# Patient Record
Sex: FEMALE | Race: OTHER | Employment: UNEMPLOYED | URBAN - METROPOLITAN AREA
[De-identification: names, ages, dates, MRNs, and addresses within clinical notes are randomized per-mention and may not be internally consistent; named-entity substitution may affect disease eponyms.]

---

## 2018-10-30 ENCOUNTER — HOSPITAL ENCOUNTER (EMERGENCY)
Age: 33
Discharge: HOME | End: 2018-10-30
Payer: COMMERCIAL

## 2018-10-30 VITALS
OXYGEN SATURATION: 98 % | WEIGHT: 212 LBS | HEART RATE: 81 BPM | TEMPERATURE: 97.8 F | RESPIRATION RATE: 18 BRPM | HEIGHT: 62 IN | SYSTOLIC BLOOD PRESSURE: 101 MMHG | BODY MASS INDEX: 39.01 KG/M2 | DIASTOLIC BLOOD PRESSURE: 62 MMHG

## 2018-10-30 DIAGNOSIS — L30.9 DERMATITIS: ICD-10-CM
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
## 2018-10-30 PROCEDURE — 99282 EMERGENCY DEPT VISIT SF MDM: CPT

## 2018-10-30 RX ORDER — DOXYCYCLINE HYCLATE 100 MG
100 TABLET ORAL 2 TIMES DAILY
Qty: 28 TABLET | Refills: 0 | Status: SHIPPED | OUTPATIENT
Start: 2018-10-30 | End: 2018-11-13

## 2018-10-30 RX ORDER — IBUPROFEN 800 MG/1
800 TABLET ORAL EVERY 6 HOURS PRN
Qty: 20 TABLET | Refills: 0 | Status: SHIPPED | OUTPATIENT
Start: 2018-10-30 | End: 2019-02-05 | Stop reason: ALTCHOICE

## 2018-10-30 ASSESSMENT — PAIN SCALES - GENERAL: PAINLEVEL_OUTOF10: 3

## 2018-10-30 ASSESSMENT — PAIN DESCRIPTION - DESCRIPTORS: DESCRIPTORS: BURNING

## 2018-10-30 ASSESSMENT — PAIN DESCRIPTION - ORIENTATION: ORIENTATION: LOWER;LEFT

## 2018-10-30 ASSESSMENT — PAIN DESCRIPTION - FREQUENCY: FREQUENCY: CONTINUOUS

## 2018-10-30 ASSESSMENT — PAIN DESCRIPTION - LOCATION: LOCATION: BACK;FOOT

## 2019-02-05 ENCOUNTER — HOSPITAL ENCOUNTER (EMERGENCY)
Age: 34
Discharge: HOME | End: 2019-02-05
Payer: COMMERCIAL

## 2019-02-05 ENCOUNTER — APPOINTMENT (OUTPATIENT)
Dept: GENERAL RADIOLOGY | Age: 34
End: 2019-02-05
Payer: COMMERCIAL

## 2019-02-05 VITALS
DIASTOLIC BLOOD PRESSURE: 64 MMHG | RESPIRATION RATE: 18 BRPM | HEART RATE: 85 BPM | TEMPERATURE: 98 F | OXYGEN SATURATION: 99 % | SYSTOLIC BLOOD PRESSURE: 105 MMHG

## 2019-02-05 DIAGNOSIS — M79.671 RIGHT FOOT PAIN: Primary | ICD-10-CM

## 2019-02-05 PROCEDURE — 6370000000 HC RX 637 (ALT 250 FOR IP): Performed by: PHYSICIAN ASSISTANT

## 2019-02-05 PROCEDURE — 73630 X-RAY EXAM OF FOOT: CPT

## 2019-02-05 PROCEDURE — 99283 EMERGENCY DEPT VISIT LOW MDM: CPT

## 2019-02-05 RX ORDER — IBUPROFEN 800 MG/1
800 TABLET ORAL EVERY 6 HOURS PRN
Qty: 20 TABLET | Refills: 0 | Status: SHIPPED | OUTPATIENT
Start: 2019-02-05 | End: 2019-10-03 | Stop reason: SDUPTHER

## 2019-02-05 RX ORDER — IBUPROFEN 800 MG/1
800 TABLET ORAL ONCE
Status: COMPLETED | OUTPATIENT
Start: 2019-02-05 | End: 2019-02-05

## 2019-02-05 RX ADMIN — IBUPROFEN 800 MG: 800 TABLET ORAL at 17:37

## 2019-02-05 ASSESSMENT — PAIN SCALES - GENERAL
PAINLEVEL_OUTOF10: 4
PAINLEVEL_OUTOF10: 4

## 2019-02-18 ENCOUNTER — HOSPITAL ENCOUNTER (EMERGENCY)
Age: 34
Discharge: HOME | End: 2019-02-18
Payer: COMMERCIAL

## 2019-02-18 ENCOUNTER — APPOINTMENT (OUTPATIENT)
Dept: CT IMAGING | Age: 34
End: 2019-02-18
Payer: COMMERCIAL

## 2019-02-18 VITALS
DIASTOLIC BLOOD PRESSURE: 69 MMHG | OXYGEN SATURATION: 98 % | HEART RATE: 83 BPM | RESPIRATION RATE: 18 BRPM | SYSTOLIC BLOOD PRESSURE: 101 MMHG | TEMPERATURE: 97.9 F

## 2019-02-18 DIAGNOSIS — R31.9 HEMATURIA, UNSPECIFIED TYPE: Primary | ICD-10-CM

## 2019-02-18 LAB
BACTERIA: ABNORMAL /HPF
BILIRUBIN URINE: NEGATIVE
BLOOD, URINE: ABNORMAL
CLARITY: CLEAR
COLOR: YELLOW
GLUCOSE URINE: 100 MG/DL
HCG, URINE, POC: NEGATIVE
KETONES, URINE: NEGATIVE MG/DL
LEUKOCYTE ESTERASE, URINE: ABNORMAL
Lab: NORMAL
NEGATIVE QC PASS/FAIL: NORMAL
NITRITE, URINE: NEGATIVE
PH UA: 7.5 (ref 5–9)
POSITIVE QC PASS/FAIL: NORMAL
PROTEIN UA: NEGATIVE MG/DL
RBC UA: >20 /HPF (ref 0–2)
SPECIFIC GRAVITY UA: 1.01 (ref 1–1.03)
UROBILINOGEN, URINE: 0.2 E.U./DL
WBC UA: ABNORMAL /HPF (ref 0–5)

## 2019-02-18 PROCEDURE — 81001 URINALYSIS AUTO W/SCOPE: CPT

## 2019-02-18 PROCEDURE — 87088 URINE BACTERIA CULTURE: CPT

## 2019-02-18 PROCEDURE — 74176 CT ABD & PELVIS W/O CONTRAST: CPT

## 2019-02-18 PROCEDURE — 99283 EMERGENCY DEPT VISIT LOW MDM: CPT

## 2019-02-18 RX ORDER — NITROFURANTOIN 25; 75 MG/1; MG/1
100 CAPSULE ORAL 2 TIMES DAILY
Qty: 10 CAPSULE | Refills: 0 | Status: SHIPPED | OUTPATIENT
Start: 2019-02-18 | End: 2019-02-23

## 2019-02-18 ASSESSMENT — PAIN DESCRIPTION - LOCATION: LOCATION: ABDOMEN

## 2019-02-18 ASSESSMENT — PAIN DESCRIPTION - PAIN TYPE: TYPE: ACUTE PAIN

## 2019-02-18 ASSESSMENT — PAIN DESCRIPTION - ORIENTATION: ORIENTATION: LOWER

## 2019-02-18 ASSESSMENT — PAIN SCALES - GENERAL: PAINLEVEL_OUTOF10: 3

## 2019-02-20 LAB — URINE CULTURE, ROUTINE: NORMAL

## 2019-06-07 ENCOUNTER — APPOINTMENT (OUTPATIENT)
Dept: CT IMAGING | Age: 34
End: 2019-06-07
Payer: COMMERCIAL

## 2019-06-07 ENCOUNTER — HOSPITAL ENCOUNTER (EMERGENCY)
Age: 34
Discharge: HOME | End: 2019-06-07
Attending: EMERGENCY MEDICINE
Payer: COMMERCIAL

## 2019-06-07 VITALS
HEART RATE: 76 BPM | OXYGEN SATURATION: 100 % | SYSTOLIC BLOOD PRESSURE: 111 MMHG | DIASTOLIC BLOOD PRESSURE: 62 MMHG | WEIGHT: 200 LBS | RESPIRATION RATE: 15 BRPM | HEIGHT: 62 IN | TEMPERATURE: 98.1 F | BODY MASS INDEX: 36.8 KG/M2

## 2019-06-07 DIAGNOSIS — R10.12 LEFT UPPER QUADRANT PAIN: ICD-10-CM

## 2019-06-07 DIAGNOSIS — R19.7 NAUSEA VOMITING AND DIARRHEA: Primary | ICD-10-CM

## 2019-06-07 DIAGNOSIS — R11.2 NAUSEA VOMITING AND DIARRHEA: Primary | ICD-10-CM

## 2019-06-07 LAB
ALBUMIN SERPL-MCNC: 3.9 G/DL (ref 3.5–5.2)
ALP BLD-CCNC: 133 U/L (ref 35–104)
ALT SERPL-CCNC: 25 U/L (ref 0–32)
ANION GAP SERPL CALCULATED.3IONS-SCNC: 7 MMOL/L (ref 7–16)
AST SERPL-CCNC: 35 U/L (ref 0–31)
BILIRUB SERPL-MCNC: 0.6 MG/DL (ref 0–1.2)
BILIRUBIN URINE: NEGATIVE
BLOOD, URINE: NEGATIVE
BUN BLDV-MCNC: 14 MG/DL (ref 6–20)
CALCIUM SERPL-MCNC: 9.4 MG/DL (ref 8.6–10.2)
CHLORIDE BLD-SCNC: 99 MMOL/L (ref 98–107)
CLARITY: CLEAR
CO2: 31 MMOL/L (ref 22–29)
COLOR: YELLOW
CREAT SERPL-MCNC: 1 MG/DL (ref 0.5–1)
GFR AFRICAN AMERICAN: >60
GFR NON-AFRICAN AMERICAN: >60 ML/MIN/1.73
GLUCOSE BLD-MCNC: 259 MG/DL (ref 74–99)
GLUCOSE URINE: >=1000 MG/DL
HCG, URINE, POC: NEGATIVE
HCT VFR BLD CALC: 39.8 % (ref 34–48)
HEMOGLOBIN: 13.2 G/DL (ref 11.5–15.5)
KETONES, URINE: NEGATIVE MG/DL
LACTIC ACID: 0.9 MMOL/L (ref 0.5–2.2)
LEUKOCYTE ESTERASE, URINE: NEGATIVE
LIPASE: 23 U/L (ref 13–60)
Lab: NORMAL
MCH RBC QN AUTO: 26.9 PG (ref 26–35)
MCHC RBC AUTO-ENTMCNC: 33.2 % (ref 32–34.5)
MCV RBC AUTO: 81.2 FL (ref 80–99.9)
NEGATIVE QC PASS/FAIL: NORMAL
NITRITE, URINE: NEGATIVE
PDW BLD-RTO: 12.2 FL (ref 11.5–15)
PH UA: 5.5 (ref 5–9)
PLATELET # BLD: 296 E9/L (ref 130–450)
PMV BLD AUTO: 11.3 FL (ref 7–12)
POSITIVE QC PASS/FAIL: NORMAL
POTASSIUM SERPL-SCNC: 4.1 MMOL/L (ref 3.5–5)
PROTEIN UA: NEGATIVE MG/DL
RBC # BLD: 4.9 E12/L (ref 3.5–5.5)
SODIUM BLD-SCNC: 137 MMOL/L (ref 132–146)
SPECIFIC GRAVITY UA: 1.01 (ref 1–1.03)
TOTAL PROTEIN: 7.9 G/DL (ref 6.4–8.3)
UROBILINOGEN, URINE: 0.2 E.U./DL
WBC # BLD: 7.8 E9/L (ref 4.5–11.5)

## 2019-06-07 PROCEDURE — 81003 URINALYSIS AUTO W/O SCOPE: CPT

## 2019-06-07 PROCEDURE — 96375 TX/PRO/DX INJ NEW DRUG ADDON: CPT

## 2019-06-07 PROCEDURE — 80053 COMPREHEN METABOLIC PANEL: CPT

## 2019-06-07 PROCEDURE — 6360000002 HC RX W HCPCS: Performed by: EMERGENCY MEDICINE

## 2019-06-07 PROCEDURE — C9113 INJ PANTOPRAZOLE SODIUM, VIA: HCPCS | Performed by: EMERGENCY MEDICINE

## 2019-06-07 PROCEDURE — 36415 COLL VENOUS BLD VENIPUNCTURE: CPT

## 2019-06-07 PROCEDURE — 74177 CT ABD & PELVIS W/CONTRAST: CPT

## 2019-06-07 PROCEDURE — 83690 ASSAY OF LIPASE: CPT

## 2019-06-07 PROCEDURE — 2580000003 HC RX 258: Performed by: RADIOLOGY

## 2019-06-07 PROCEDURE — 2580000003 HC RX 258: Performed by: EMERGENCY MEDICINE

## 2019-06-07 PROCEDURE — 83605 ASSAY OF LACTIC ACID: CPT

## 2019-06-07 PROCEDURE — 96374 THER/PROPH/DIAG INJ IV PUSH: CPT

## 2019-06-07 PROCEDURE — 85027 COMPLETE CBC AUTOMATED: CPT

## 2019-06-07 PROCEDURE — 99284 EMERGENCY DEPT VISIT MOD MDM: CPT

## 2019-06-07 PROCEDURE — 6360000004 HC RX CONTRAST MEDICATION: Performed by: RADIOLOGY

## 2019-06-07 RX ORDER — SODIUM CHLORIDE 0.9 % (FLUSH) 0.9 %
10 SYRINGE (ML) INJECTION PRN
Status: COMPLETED | OUTPATIENT
Start: 2019-06-07 | End: 2019-06-07

## 2019-06-07 RX ORDER — DICYCLOMINE HYDROCHLORIDE 10 MG/1
10 CAPSULE ORAL 3 TIMES DAILY
Qty: 21 CAPSULE | Refills: 0 | Status: SHIPPED | OUTPATIENT
Start: 2019-06-07 | End: 2020-04-22

## 2019-06-07 RX ORDER — PANTOPRAZOLE SODIUM 40 MG/10ML
40 INJECTION, POWDER, LYOPHILIZED, FOR SOLUTION INTRAVENOUS ONCE
Status: COMPLETED | OUTPATIENT
Start: 2019-06-07 | End: 2019-06-07

## 2019-06-07 RX ORDER — KETOROLAC TROMETHAMINE 30 MG/ML
15 INJECTION, SOLUTION INTRAMUSCULAR; INTRAVENOUS ONCE
Status: COMPLETED | OUTPATIENT
Start: 2019-06-07 | End: 2019-06-07

## 2019-06-07 RX ORDER — 0.9 % SODIUM CHLORIDE 0.9 %
1000 INTRAVENOUS SOLUTION INTRAVENOUS ONCE
Status: COMPLETED | OUTPATIENT
Start: 2019-06-07 | End: 2019-06-07

## 2019-06-07 RX ORDER — ONDANSETRON 4 MG/1
4 TABLET, ORALLY DISINTEGRATING ORAL EVERY 8 HOURS PRN
Qty: 9 TABLET | Refills: 0 | Status: SHIPPED | OUTPATIENT
Start: 2019-06-07 | End: 2019-06-10

## 2019-06-07 RX ORDER — ONDANSETRON 2 MG/ML
4 INJECTION INTRAMUSCULAR; INTRAVENOUS ONCE
Status: COMPLETED | OUTPATIENT
Start: 2019-06-07 | End: 2019-06-07

## 2019-06-07 RX ADMIN — ONDANSETRON HYDROCHLORIDE 4 MG: 2 SOLUTION INTRAMUSCULAR; INTRAVENOUS at 02:54

## 2019-06-07 RX ADMIN — SODIUM CHLORIDE 1000 ML: 9 INJECTION, SOLUTION INTRAVENOUS at 03:42

## 2019-06-07 RX ADMIN — Medication 10 ML: at 05:34

## 2019-06-07 RX ADMIN — SODIUM CHLORIDE 1000 ML: 9 INJECTION, SOLUTION INTRAVENOUS at 02:07

## 2019-06-07 RX ADMIN — KETOROLAC TROMETHAMINE 15 MG: 30 INJECTION, SOLUTION INTRAMUSCULAR at 03:41

## 2019-06-07 RX ADMIN — PANTOPRAZOLE SODIUM 40 MG: 40 INJECTION, POWDER, FOR SOLUTION INTRAVENOUS at 02:54

## 2019-06-07 RX ADMIN — IOPAMIDOL 110 ML: 755 INJECTION, SOLUTION INTRAVENOUS at 05:33

## 2019-06-07 ASSESSMENT — PAIN DESCRIPTION - LOCATION: LOCATION: ABDOMEN

## 2019-06-07 ASSESSMENT — PAIN SCALES - GENERAL
PAINLEVEL_OUTOF10: 8
PAINLEVEL_OUTOF10: 8

## 2019-06-07 ASSESSMENT — PAIN DESCRIPTION - FREQUENCY: FREQUENCY: CONTINUOUS

## 2019-06-07 ASSESSMENT — PAIN DESCRIPTION - PAIN TYPE: TYPE: ACUTE PAIN

## 2019-06-07 ASSESSMENT — PAIN DESCRIPTION - DESCRIPTORS: DESCRIPTORS: ACHING

## 2019-06-07 NOTE — ED NOTES
Pt advised on need of urine sample. Pt states she cannot urinate at this time. Medications to be held until POC preg resulted. Fluids infusing to help urination process.      Reji Watt RN  06/07/19 8204

## 2019-06-07 NOTE — ED NOTES
Bed: 07  Expected date:   Expected time:   Means of arrival:   Comments:  triage     Karina Freeman RN  06/07/19 0128

## 2019-06-07 NOTE — ED PROVIDER NOTES
Department of Emergency Medicine   ED  Provider Note  Admit Date/RoomTime: 6/7/2019  1:28 AM  ED Room: 07/07      History of Present Illness:  6/7/19, Time: 1:38 AM         Anamaria Florentino is a 26 y.o. female presenting to the ED for abdominal pain and diarrhea, beginning yesterday. The complaints have been persistent, moderate in severity, and worsened by nothing. Patient also presents with nausea and had 1 episode of emesis. Patient has a hx of DM. She took imodium with little relief of her diarrhea. Patient denies  SOB, hematochezia, melena, hematemesis, fever, chills, urinary symptoms, sick contacts, or any other pertinent complaints. Review of Systems:   Pertinent positives and negatives are stated within HPI, all other systems reviewed and are negative.    --------------------------------------------- PAST HISTORY ---------------------------------------------  Past Medical History:  has a past medical history of Diabetes mellitus ([facility] [address] Past Surgical History:  has a past surgical history that includes Cholecystectomy. Social History:  reports that she has never smoked. She has never used smokeless tobacco. She reports that she does not drink alcohol or use drugs. Family History: family history is not on file. The patients home medications have been reviewed. Allergies: Rocephin [ceftriaxone]    ---------------------------------------------------PHYSICAL EXAM--------------------------------------    Constitutional/General: Alert and oriented x3, Mild distress  Head: Normocephalic and atraumatic  Eyes: PERRL, EOMI  Mouth: Oropharynx clear  Neck: Supple  Respiratory: Lungs clear to auscultation bilaterally, no wheezes, rales, or rhonchi. Not in respiratory distress  Cardiovascular:  Regular rate. Regular rhythm. No murmurs, gallops, or rubs. 2+ distal pulses  GI:  Abdomen Soft, LUQ tenderness, Non distended. +BS. No rebound, guarding, or rigidity. No pulsatile masses.   Musculoskeletal: Moves all extremities x 4. Warm and well perfused, no clubbing, cyanosis, or edema. Integument: Skin warm and dry. No rashes. Neurologic: GCS 15, no focal deficits    -------------------------------------------------- RESULTS -------------------------------------------------  I have personally reviewed all laboratory and imaging results for this patient. Results are listed below.      LABS:  Results for orders placed or performed during the hospital encounter of 06/07/19   CBC   Result Value Ref Range    WBC 7.8 4.5 - 11.5 E9/L    RBC 4.90 3.50 - 5.50 E12/L    Hemoglobin 13.2 11.5 - 15.5 g/dL    Hematocrit 39.8 34.0 - 48.0 %    MCV 81.2 80.0 - 99.9 fL    MCH 26.9 26.0 - 35.0 pg    MCHC 33.2 32.0 - 34.5 %    RDW 12.2 11.5 - 15.0 fL    Platelets 815 496 - 873 E9/L    MPV 11.3 7.0 - 12.0 fL   Comprehensive Metabolic Panel   Result Value Ref Range    Sodium 137 132 - 146 mmol/L    Potassium 4.1 3.5 - 5.0 mmol/L    Chloride 99 98 - 107 mmol/L    CO2 31 (H) 22 - 29 mmol/L    Anion Gap 7 7 - 16 mmol/L    Glucose 259 (H) 74 - 99 mg/dL    BUN 14 6 - 20 mg/dL    CREATININE 1.0 0.5 - 1.0 mg/dL    GFR Non-African American >60 >=60 mL/min/1.73    GFR African American >60     Calcium 9.4 8.6 - 10.2 mg/dL    Total Protein 7.9 6.4 - 8.3 g/dL    Alb 3.9 3.5 - 5.2 g/dL    Total Bilirubin 0.6 0.0 - 1.2 mg/dL    Alkaline Phosphatase 133 (H) 35 - 104 U/L    ALT 25 0 - 32 U/L    AST 35 (H) 0 - 31 U/L   Lipase   Result Value Ref Range    Lipase 23 13 - 60 U/L   Lactic Acid, Plasma   Result Value Ref Range    Lactic Acid 0.9 0.5 - 2.2 mmol/L   Urinalysis   Result Value Ref Range    Color, UA Yellow Straw/Yellow    Clarity, UA Clear Clear    Glucose, Ur >=1000 (A) Negative mg/dL    Bilirubin Urine Negative Negative    Ketones, Urine Negative Negative mg/dL    Specific Gravity, UA 1.010 1.005 - 1.030    Blood, Urine Negative Negative    pH, UA 5.5 5.0 - 9.0    Protein, UA Negative Negative mg/dL    Urobilinogen, Urine 0.2 <2.0 E.U./dL Nitrite, Urine Negative Negative    Leukocyte Esterase, Urine Negative Negative   POC Pregnancy Urine Qual   Result Value Ref Range    HCG, Urine, POC Negative Negative    Lot Number [id number]     Positive QC Pass/Fail Pass     Negative QC Pass/Fail Pass        RADIOLOGY:  Interpreted by Radiologist.  CT ABDOMEN PELVIS W IV CONTRAST Additional Contrast? None   Final Result   Mild liquid stool in the left colon. This report has been electronically signed by Erin Hillman MD.            ------------------------- NURSING NOTES AND VITALS REVIEWED ---------------------------   The nursing notes within the ED encounter and vital signs as below have been reviewed by myself. /66   Pulse 77   Temp 97.5 °F (36.4 °C)   Resp 16   Ht 5' 2\" (1.575 m)   Wt 200 lb (90.7 kg)   SpO2 97%   BMI 36.58 kg/m²   Oxygen Saturation Interpretation: Normal    The patients available past medical records and past encounters were reviewed. ------------------------------ ED COURSE/MEDICAL DECISION MAKING----------------------  Medications   0.9 % sodium chloride bolus (0 mLs Intravenous Stopped 6/7/19 0423)   ondansetron (ZOFRAN) injection 4 mg (4 mg Intravenous Given 6/7/19 0254)   pantoprazole (PROTONIX) injection 40 mg (40 mg Intravenous Given 6/7/19 0254)   ketorolac (TORADOL) injection 15 mg (15 mg Intravenous Given 6/7/19 0341)   0.9 % sodium chloride bolus (1,000 mLs Intravenous New Bag 6/7/19 0342)   sodium chloride flush 0.9 % injection 10 mL (10 mLs Intravenous Given 6/7/19 0534)   iopamidol (ISOVUE-370) 76 % injection 110 mL (110 mLs Intravenous Given 6/7/19 0533)       Medical Decision Making:      Patient presents to the ED for diarrhea, abdominal pain, nausea and emesis. Patient examined. Ordered and obtained labs. Patient administered IV fluids, zofran and protonix. Reviewed and discussed results and findings with the patient.      This patient's ED course included: a personal history and physicial examination, re-evaluation prior to disposition and IV medications    This patient has remained hemodynamically stable during their ED course. Re-Evaluations:           Patient rechecked and medicated with some improvement. Fossa mild left upper Abdominal pain but improved and patient made aware of need for follow-up and need to return if symptoms worsen. Patient and family present at the time of discussion    Consultations:                 Counseling: The emergency provider has spoken with the patient and spouse/SO and discussed todays results, in addition to providing specific details for the plan of care and counseling regarding the diagnosis and prognosis. Questions are answered at this time and they are agreeable with the plan.     --------------------------------- IMPRESSION AND DISPOSITION ---------------------------------    IMPRESSION  1. Nausea vomiting and diarrhea    2. Left upper quadrant pain        DISPOSITION  Disposition: Discharge to home  Patient condition is stable    6/7/19, 1:38 AM.    This note is prepared by Adela Middleton, acting as Scribe for Marcia Browne MD.    Marcia Browne MD:  The scribe's documentation has been prepared under my direction and personally reviewed by me in its entirety. I confirm that the note above accurately reflects all work, treatment, procedures, and medical decision making performed by me.          Marcia Browne MD  06/07/19 [address] [place] Elvin Delgado MD  06/07/19 8224

## 2019-06-07 NOTE — ED NOTES
Pt alert and oriented x4. Speech clear. Respirations easy/unlabored. Skin warm/dry. Appropriate color. No signs of acute distress noted. Pt/family teaching provided; verbalized understanding. Pt stable for discharge.      Fifi Julian RN  06/07/19 2677

## 2019-08-26 ENCOUNTER — APPOINTMENT (OUTPATIENT)
Dept: GENERAL RADIOLOGY | Age: 34
End: 2019-08-26
Payer: COMMERCIAL

## 2019-08-26 ENCOUNTER — HOSPITAL ENCOUNTER (EMERGENCY)
Age: 34
Discharge: HOME | End: 2019-08-27
Payer: COMMERCIAL

## 2019-08-26 VITALS
RESPIRATION RATE: 16 BRPM | TEMPERATURE: 98.7 F | WEIGHT: 220 LBS | HEIGHT: 62 IN | DIASTOLIC BLOOD PRESSURE: 76 MMHG | SYSTOLIC BLOOD PRESSURE: 97 MMHG | OXYGEN SATURATION: 98 % | HEART RATE: 96 BPM | BODY MASS INDEX: 40.48 KG/M2

## 2019-08-26 DIAGNOSIS — J02.9 ACUTE PHARYNGITIS, UNSPECIFIED ETIOLOGY: ICD-10-CM

## 2019-08-26 DIAGNOSIS — J06.9 UPPER RESPIRATORY TRACT INFECTION, UNSPECIFIED TYPE: Primary | ICD-10-CM

## 2019-08-26 DIAGNOSIS — R05.9 COUGH: ICD-10-CM

## 2019-08-26 LAB
HCG, URINE, POC: NEGATIVE
Lab: NORMAL
NEGATIVE QC PASS/FAIL: NORMAL
POSITIVE QC PASS/FAIL: NORMAL
STREP GRP A PCR: NEGATIVE

## 2019-08-26 PROCEDURE — 87880 STREP A ASSAY W/OPTIC: CPT

## 2019-08-26 PROCEDURE — 99283 EMERGENCY DEPT VISIT LOW MDM: CPT

## 2019-08-26 PROCEDURE — 71046 X-RAY EXAM CHEST 2 VIEWS: CPT

## 2019-08-26 ASSESSMENT — PAIN SCALES - GENERAL: PAINLEVEL_OUTOF10: 10

## 2019-08-27 PROCEDURE — 6370000000 HC RX 637 (ALT 250 FOR IP): Performed by: NURSE PRACTITIONER

## 2019-08-27 RX ORDER — BROMPHENIRAMINE MALEATE, PSEUDOEPHEDRINE HYDROCHLORIDE, AND DEXTROMETHORPHAN HYDROBROMIDE 2; 30; 10 MG/5ML; MG/5ML; MG/5ML
5 SYRUP ORAL 4 TIMES DAILY PRN
Qty: 120 ML | Refills: 0 | Status: SHIPPED | OUTPATIENT
Start: 2019-08-27 | End: 2019-09-01

## 2019-08-27 RX ORDER — AZITHROMYCIN 250 MG/1
TABLET, FILM COATED ORAL
Qty: 1 PACKET | Refills: 0 | Status: SHIPPED | OUTPATIENT
Start: 2019-08-27 | End: 2019-09-06

## 2019-08-27 RX ADMIN — HYDROCODONE BITARTRATE AND HOMATROPINE METHYLBROMIDE 5 ML: 5; 1.5 SOLUTION ORAL at 00:16

## 2019-08-27 NOTE — ED PROVIDER NOTES
negative. Patient likely with viral symptoms. Patient without any acute infectious process. Plan will be to discharge patient home with Z-Clyde due to symptoms ongoing over a week as well as Bromfed cough syrup. She was provided with 1 dose here of Hycodan cough syrup to help with the cough as well. Patient provided with referral to primary care physician to be seen within 3 to 5 days as well as when to return back to the emergency department. Patient otherwise nontoxic. Patient without any respiratory or airway compromise, no wheezing no stridor noted. Patient expressed understanding and safely discharged home       Counseling: The emergency provider has spoken with the patient and discussed todays results, in addition to providing specific details for the plan of care and counseling regarding the diagnosis and prognosis. Questions are answered at this time and they are agreeable with the plan.      --------------------------------- IMPRESSION AND DISPOSITION ---------------------------------    IMPRESSION  1. Upper respiratory tract infection, unspecified type    2. Cough    3. Acute pharyngitis, unspecified etiology        DISPOSITION  Disposition: Discharge to home  Patient condition is good      NOTE: This report was transcribed using voice recognition software.  Every effort was made to ensure accuracy; however, inadvertent computerized transcription errors may be present     LAUREN Guillermo CNP  08/27/19 7057

## 2019-10-03 ENCOUNTER — HOSPITAL ENCOUNTER (EMERGENCY)
Age: 34
Discharge: HOME | End: 2019-10-03
Payer: COMMERCIAL

## 2019-10-03 VITALS
OXYGEN SATURATION: 99 % | TEMPERATURE: 97.2 F | HEIGHT: 62 IN | WEIGHT: 220 LBS | RESPIRATION RATE: 17 BRPM | SYSTOLIC BLOOD PRESSURE: 103 MMHG | DIASTOLIC BLOOD PRESSURE: 68 MMHG | BODY MASS INDEX: 40.48 KG/M2 | HEART RATE: 95 BPM

## 2019-10-03 DIAGNOSIS — J20.9 ACUTE BRONCHITIS, UNSPECIFIED ORGANISM: Primary | ICD-10-CM

## 2019-10-03 LAB — STREP GRP A PCR: NEGATIVE

## 2019-10-03 PROCEDURE — 6370000000 HC RX 637 (ALT 250 FOR IP): Performed by: PHYSICIAN ASSISTANT

## 2019-10-03 PROCEDURE — 99283 EMERGENCY DEPT VISIT LOW MDM: CPT

## 2019-10-03 PROCEDURE — 87880 STREP A ASSAY W/OPTIC: CPT

## 2019-10-03 RX ORDER — BENZONATATE 100 MG/1
100 CAPSULE ORAL 3 TIMES DAILY PRN
Qty: 21 CAPSULE | Refills: 0 | Status: SHIPPED | OUTPATIENT
Start: 2019-10-03 | End: 2019-10-10

## 2019-10-03 RX ORDER — AZITHROMYCIN 250 MG/1
TABLET, FILM COATED ORAL
Qty: 1 PACKET | Refills: 0 | Status: SHIPPED | OUTPATIENT
Start: 2019-10-03 | End: 2019-10-07

## 2019-10-03 RX ORDER — AZITHROMYCIN 250 MG/1
500 TABLET, FILM COATED ORAL ONCE
Status: COMPLETED | OUTPATIENT
Start: 2019-10-03 | End: 2019-10-03

## 2019-10-03 RX ORDER — GUAIFENESIN/DEXTROMETHORPHAN 100-10MG/5
5 SYRUP ORAL ONCE
Status: COMPLETED | OUTPATIENT
Start: 2019-10-03 | End: 2019-10-03

## 2019-10-03 RX ORDER — IBUPROFEN 800 MG/1
800 TABLET ORAL EVERY 6 HOURS PRN
Qty: 20 TABLET | Refills: 0 | Status: SHIPPED | OUTPATIENT
Start: 2019-10-03 | End: 2020-04-22

## 2019-10-03 RX ADMIN — GUAIFENESIN AND DEXTROMETHORPHAN 5 ML: 100; 10 SYRUP ORAL at 03:41

## 2019-10-03 RX ADMIN — AZITHROMYCIN 500 MG: 250 TABLET, FILM COATED ORAL at 03:41

## 2019-10-03 ASSESSMENT — PAIN DESCRIPTION - PAIN TYPE: TYPE: ACUTE PAIN

## 2019-10-03 ASSESSMENT — PAIN SCALES - GENERAL: PAINLEVEL_OUTOF10: 10

## 2019-10-03 ASSESSMENT — PAIN DESCRIPTION - FREQUENCY: FREQUENCY: CONTINUOUS

## 2019-10-03 ASSESSMENT — PAIN DESCRIPTION - DESCRIPTORS: DESCRIPTORS: ACHING

## 2019-10-03 ASSESSMENT — PAIN DESCRIPTION - LOCATION: LOCATION: THROAT

## 2019-10-19 ENCOUNTER — HOSPITAL ENCOUNTER (EMERGENCY)
Age: 34
Discharge: HOME | End: 2019-10-19
Attending: EMERGENCY MEDICINE
Payer: COMMERCIAL

## 2019-10-19 VITALS
HEART RATE: 70 BPM | WEIGHT: 200 LBS | HEIGHT: 62 IN | TEMPERATURE: 97.2 F | RESPIRATION RATE: 16 BRPM | SYSTOLIC BLOOD PRESSURE: 145 MMHG | OXYGEN SATURATION: 98 % | DIASTOLIC BLOOD PRESSURE: 75 MMHG | BODY MASS INDEX: 36.8 KG/M2

## 2019-10-19 DIAGNOSIS — R73.9 HYPERGLYCEMIA: Primary | ICD-10-CM

## 2019-10-19 DIAGNOSIS — N39.0 URINARY TRACT INFECTION WITHOUT HEMATURIA, SITE UNSPECIFIED: ICD-10-CM

## 2019-10-19 LAB
ALBUMIN SERPL-MCNC: 3.4 G/DL (ref 3.5–5.2)
ALP BLD-CCNC: 134 U/L (ref 35–104)
ALT SERPL-CCNC: 13 U/L (ref 0–32)
ANION GAP SERPL CALCULATED.3IONS-SCNC: 8 MMOL/L (ref 7–16)
AST SERPL-CCNC: 22 U/L (ref 0–31)
BACTERIA: ABNORMAL /HPF
BASOPHILS ABSOLUTE: 0.1 E9/L (ref 0–0.2)
BASOPHILS RELATIVE PERCENT: 1.2 % (ref 0–2)
BETA-HYDROXYBUTYRATE: 0.09 MMOL/L (ref 0.02–0.27)
BILIRUB SERPL-MCNC: 0.3 MG/DL (ref 0–1.2)
BILIRUBIN URINE: NEGATIVE
BLOOD, URINE: ABNORMAL
BUN BLDV-MCNC: 15 MG/DL (ref 6–20)
CALCIUM SERPL-MCNC: 9.7 MG/DL (ref 8.6–10.2)
CHLORIDE BLD-SCNC: 93 MMOL/L (ref 98–107)
CLARITY: CLEAR
CO2: 31 MMOL/L (ref 22–29)
COLOR: YELLOW
CREAT SERPL-MCNC: 1 MG/DL (ref 0.5–1)
EKG ATRIAL RATE: 80 BPM
EKG P AXIS: 67 DEGREES
EKG P-R INTERVAL: 154 MS
EKG Q-T INTERVAL: 396 MS
EKG QRS DURATION: 86 MS
EKG QTC CALCULATION (BAZETT): 456 MS
EKG R AXIS: 47 DEGREES
EKG T AXIS: 12 DEGREES
EKG VENTRICULAR RATE: 80 BPM
EOSINOPHILS ABSOLUTE: 0.24 E9/L (ref 0.05–0.5)
EOSINOPHILS RELATIVE PERCENT: 2.9 % (ref 0–6)
EPITHELIAL CELLS, UA: ABNORMAL /HPF
GFR AFRICAN AMERICAN: >60
GFR AFRICAN AMERICAN: >60
GFR NON-AFRICAN AMERICAN: >60 ML/MIN/1.73
GFR NON-AFRICAN AMERICAN: >60 ML/MIN/1.73
GLUCOSE BLD-MCNC: 458 MG/DL (ref 74–99)
GLUCOSE BLD-MCNC: 585 MG/DL (ref 74–99)
GLUCOSE URINE: >=1000 MG/DL
HCG(URINE) PREGNANCY TEST: NEGATIVE
HCT VFR BLD CALC: 37.3 % (ref 34–48)
HEMOGLOBIN: 12.5 G/DL (ref 11.5–15.5)
IMMATURE GRANULOCYTES #: 0.03 E9/L
IMMATURE GRANULOCYTES %: 0.4 % (ref 0–5)
KETONES, URINE: NEGATIVE MG/DL
LACTIC ACID: 2.1 MMOL/L (ref 0.5–2.2)
LEUKOCYTE ESTERASE, URINE: ABNORMAL
LYMPHOCYTES ABSOLUTE: 2.69 E9/L (ref 1.5–4)
LYMPHOCYTES RELATIVE PERCENT: 32.8 % (ref 20–42)
MCH RBC QN AUTO: 27.1 PG (ref 26–35)
MCHC RBC AUTO-ENTMCNC: 33.5 % (ref 32–34.5)
MCV RBC AUTO: 80.9 FL (ref 80–99.9)
METER GLUCOSE: >500 MG/DL (ref 74–99)
MONOCYTES ABSOLUTE: 0.4 E9/L (ref 0.1–0.95)
MONOCYTES RELATIVE PERCENT: 4.9 % (ref 2–12)
NEUTROPHILS ABSOLUTE: 4.73 E9/L (ref 1.8–7.3)
NEUTROPHILS RELATIVE PERCENT: 57.8 % (ref 43–80)
NITRITE, URINE: NEGATIVE
PDW BLD-RTO: 12.1 FL (ref 11.5–15)
PERFORMED ON: ABNORMAL
PH UA: 6 (ref 5–9)
PH VENOUS: 7.29 (ref 7.35–7.45)
PLATELET # BLD: 284 E9/L (ref 130–450)
PMV BLD AUTO: 11.6 FL (ref 7–12)
POC CHLORIDE: 101 MMOL/L (ref 100–108)
POC CREATININE: 0.8 MG/DL (ref 0.5–1)
POC POTASSIUM: 4.1 MMOL/L (ref 3.5–5)
POC SODIUM: 136 MMOL/L (ref 132–146)
POTASSIUM SERPL-SCNC: 4.9 MMOL/L (ref 3.5–5)
PROTEIN UA: NEGATIVE MG/DL
RBC # BLD: 4.61 E12/L (ref 3.5–5.5)
RBC UA: ABNORMAL /HPF (ref 0–2)
SODIUM BLD-SCNC: 132 MMOL/L (ref 132–146)
SPECIFIC GRAVITY UA: <=1.005 (ref 1–1.03)
TOTAL PROTEIN: 7.7 G/DL (ref 6.4–8.3)
UROBILINOGEN, URINE: 0.2 E.U./DL
WBC # BLD: 8.2 E9/L (ref 4.5–11.5)
WBC UA: ABNORMAL /HPF (ref 0–5)
YEAST: ABNORMAL

## 2019-10-19 PROCEDURE — 93010 ELECTROCARDIOGRAM REPORT: CPT | Performed by: INTERNAL MEDICINE

## 2019-10-19 PROCEDURE — 6370000000 HC RX 637 (ALT 250 FOR IP): Performed by: EMERGENCY MEDICINE

## 2019-10-19 PROCEDURE — 96374 THER/PROPH/DIAG INJ IV PUSH: CPT

## 2019-10-19 PROCEDURE — 82962 GLUCOSE BLOOD TEST: CPT

## 2019-10-19 PROCEDURE — 2580000003 HC RX 258: Performed by: EMERGENCY MEDICINE

## 2019-10-19 PROCEDURE — 84295 ASSAY OF SERUM SODIUM: CPT

## 2019-10-19 PROCEDURE — 81025 URINE PREGNANCY TEST: CPT

## 2019-10-19 PROCEDURE — 82565 ASSAY OF CREATININE: CPT

## 2019-10-19 PROCEDURE — 85025 COMPLETE CBC W/AUTO DIFF WBC: CPT

## 2019-10-19 PROCEDURE — 80053 COMPREHEN METABOLIC PANEL: CPT

## 2019-10-19 PROCEDURE — 99285 EMERGENCY DEPT VISIT HI MDM: CPT

## 2019-10-19 PROCEDURE — 2580000003 HC RX 258: Performed by: NURSE PRACTITIONER

## 2019-10-19 PROCEDURE — 36415 COLL VENOUS BLD VENIPUNCTURE: CPT

## 2019-10-19 PROCEDURE — 83605 ASSAY OF LACTIC ACID: CPT

## 2019-10-19 PROCEDURE — 82800 BLOOD PH: CPT

## 2019-10-19 PROCEDURE — 81001 URINALYSIS AUTO W/SCOPE: CPT

## 2019-10-19 PROCEDURE — 82010 KETONE BODYS QUAN: CPT

## 2019-10-19 PROCEDURE — 82947 ASSAY GLUCOSE BLOOD QUANT: CPT

## 2019-10-19 PROCEDURE — 82435 ASSAY OF BLOOD CHLORIDE: CPT

## 2019-10-19 PROCEDURE — 84132 ASSAY OF SERUM POTASSIUM: CPT

## 2019-10-19 PROCEDURE — 93005 ELECTROCARDIOGRAM TRACING: CPT | Performed by: NURSE PRACTITIONER

## 2019-10-19 RX ORDER — NITROFURANTOIN 25; 75 MG/1; MG/1
100 CAPSULE ORAL 2 TIMES DAILY
Qty: 10 CAPSULE | Refills: 0 | Status: SHIPPED | OUTPATIENT
Start: 2019-10-19 | End: 2019-10-24

## 2019-10-19 RX ORDER — 0.9 % SODIUM CHLORIDE 0.9 %
1000 INTRAVENOUS SOLUTION INTRAVENOUS ONCE
Status: COMPLETED | OUTPATIENT
Start: 2019-10-19 | End: 2019-10-19

## 2019-10-19 RX ORDER — 0.9 % SODIUM CHLORIDE 0.9 %
500 INTRAVENOUS SOLUTION INTRAVENOUS ONCE
Status: COMPLETED | OUTPATIENT
Start: 2019-10-19 | End: 2019-10-19

## 2019-10-19 RX ADMIN — SODIUM CHLORIDE 1000 ML: 9 INJECTION, SOLUTION INTRAVENOUS at 03:03

## 2019-10-19 RX ADMIN — INSULIN HUMAN 7 UNITS: 100 INJECTION, SOLUTION PARENTERAL at 05:20

## 2019-10-19 RX ADMIN — SODIUM CHLORIDE 500 ML: 9 INJECTION, SOLUTION INTRAVENOUS at 05:25

## 2019-10-19 RX ADMIN — SODIUM CHLORIDE 1000 ML: 9 INJECTION, SOLUTION INTRAVENOUS at 04:10

## 2019-11-26 ENCOUNTER — TELEPHONE (OUTPATIENT)
Dept: ADMINISTRATIVE | Age: 34
End: 2019-11-26

## 2020-01-03 ENCOUNTER — HOSPITAL ENCOUNTER (EMERGENCY)
Age: 35
Discharge: HOME | End: 2020-01-03
Payer: COMMERCIAL

## 2020-01-03 VITALS
OXYGEN SATURATION: 97 % | HEART RATE: 76 BPM | SYSTOLIC BLOOD PRESSURE: 101 MMHG | DIASTOLIC BLOOD PRESSURE: 55 MMHG | TEMPERATURE: 97.7 F | RESPIRATION RATE: 16 BRPM

## 2020-01-03 LAB
INFLUENZA A BY PCR: NOT DETECTED
INFLUENZA B BY PCR: NOT DETECTED

## 2020-01-03 PROCEDURE — 87502 INFLUENZA DNA AMP PROBE: CPT

## 2020-01-03 PROCEDURE — 99283 EMERGENCY DEPT VISIT LOW MDM: CPT

## 2020-01-03 RX ORDER — FLUTICASONE PROPIONATE 50 MCG
2 SPRAY, SUSPENSION (ML) NASAL DAILY
Qty: 1 BOTTLE | Refills: 0 | Status: SHIPPED | OUTPATIENT
Start: 2020-01-03 | End: 2020-03-05

## 2020-01-03 RX ORDER — BROMPHENIRAMINE MALEATE, PSEUDOEPHEDRINE HYDROCHLORIDE, AND DEXTROMETHORPHAN HYDROBROMIDE 2; 30; 10 MG/5ML; MG/5ML; MG/5ML
5 SYRUP ORAL 4 TIMES DAILY PRN
Qty: 240 ML | Refills: 1 | Status: SHIPPED | OUTPATIENT
Start: 2020-01-03 | End: 2020-02-02

## 2020-01-03 RX ORDER — TRIAMCINOLONE ACETONIDE 1 MG/G
CREAM TOPICAL
Qty: 45 G | Refills: 0 | Status: SHIPPED | OUTPATIENT
Start: 2020-01-03 | End: 2020-01-10

## 2020-01-04 NOTE — ED PROVIDER NOTES
[facility]       Department of Emergency Medicine   ED  Provider Note  Admit Date/RoomTime: 1/3/2020  2:03 PM  ED Room: [address] [place] [address]  Chief Complaint:   Nasal Congestion (x 4 days) and Rash (on backs of hands)    History of Present Illness   Source of history provided by:  patient. History/Exam Limitations: none. Calli Lindsey is a 37 y.o. old female with a past medical history of:   Past Medical History:   Diagnosis Date    Diabetes mellitus ([address]     presents to the emergency department for complaint of nasal congestion and rash to hands. She reports that she gets this rash when it is cold outside. States that it intermittently itches. Denies any new exposures. Denies fever, chills, cough, chest pain, shortness of breath, neck pain/stiffnesss, sore throat, earache, lightheadedness, dizziness, syncope, or any other complaints at this time. Since onset the symptoms have been intermittent and mild-moderate in severity. ROS   Pertinent positives and negatives are stated within HPI, all other systems reviewed and are negative. Past Surgical History:  has a past surgical history that includes Cholecystectomy. Social History:  reports that she is a non-smoker but has been exposed to tobacco smoke. She has never used smokeless tobacco. She reports that she does not drink alcohol or use drugs. Family History: family history is not on file. Allergies: Rocephin [ceftriaxone]    Physical Exam           ED Triage Vitals   BP Temp Temp Source Pulse Resp SpO2 Height Weight   01/03/20 1419 01/03/20 1419 01/03/20 1419 01/03/20 1253 01/03/20 1419 01/03/20 1253 -- --   (!) 100/59 97.7 °F (36.5 °C) Oral 78 16 98 %        Oxygen Saturation Interpretation: Normal.    · Constitutional:  Alert, development consistent with age. · Ears:  External Ears: Bilateral normal.               TM's & External Canals: normal appearance. · Nose:   There is no discharge, swelling or lesions noted.   · Sinuses: no Bilateral

## 2020-03-05 ENCOUNTER — HOSPITAL ENCOUNTER (INPATIENT)
Age: 35
LOS: 5 days | Discharge: HOME | DRG: 249 | End: 2020-03-10
Attending: EMERGENCY MEDICINE | Admitting: INTERNAL MEDICINE
Payer: COMMERCIAL

## 2020-03-05 ENCOUNTER — APPOINTMENT (OUTPATIENT)
Dept: CT IMAGING | Age: 35
DRG: 249 | End: 2020-03-05
Payer: COMMERCIAL

## 2020-03-05 ENCOUNTER — APPOINTMENT (OUTPATIENT)
Dept: GENERAL RADIOLOGY | Age: 35
DRG: 249 | End: 2020-03-05
Payer: COMMERCIAL

## 2020-03-05 PROBLEM — I95.9 HYPOTENSION: Status: ACTIVE | Noted: 2020-03-05

## 2020-03-05 LAB
ALBUMIN SERPL-MCNC: 3.3 G/DL (ref 3.5–5.2)
ALP BLD-CCNC: 108 U/L (ref 35–104)
ALT SERPL-CCNC: 20 U/L (ref 0–32)
ANION GAP SERPL CALCULATED.3IONS-SCNC: 13 MMOL/L (ref 7–16)
AST SERPL-CCNC: 35 U/L (ref 0–31)
BACTERIA: ABNORMAL /HPF
BASOPHILS ABSOLUTE: 0.06 E9/L (ref 0–0.2)
BASOPHILS RELATIVE PERCENT: 0.6 % (ref 0–2)
BETA-HYDROXYBUTYRATE: 0.27 MMOL/L (ref 0.02–0.27)
BILIRUB SERPL-MCNC: 0.8 MG/DL (ref 0–1.2)
BILIRUBIN URINE: NEGATIVE
BLOOD, URINE: ABNORMAL
BUN BLDV-MCNC: 6 MG/DL (ref 6–20)
CALCIUM SERPL-MCNC: 8.8 MG/DL (ref 8.6–10.2)
CHLORIDE BLD-SCNC: 101 MMOL/L (ref 98–107)
CHP ED QC CHECK: NORMAL
CHP ED QC CHECK: YES
CLARITY: ABNORMAL
CO2: 23 MMOL/L (ref 22–29)
COLOR: YELLOW
CREAT SERPL-MCNC: 1 MG/DL (ref 0.5–1)
EOSINOPHILS ABSOLUTE: 0.23 E9/L (ref 0.05–0.5)
EOSINOPHILS RELATIVE PERCENT: 2.3 % (ref 0–6)
GFR AFRICAN AMERICAN: >60
GFR NON-AFRICAN AMERICAN: >60 ML/MIN/1.73
GLUCOSE BLD-MCNC: 132 MG/DL
GLUCOSE BLD-MCNC: 158 MG/DL (ref 74–99)
GLUCOSE BLD-MCNC: 206 MG/DL
GLUCOSE URINE: NEGATIVE MG/DL
HCG, URINE, POC: NEGATIVE
HCT VFR BLD CALC: 42.5 % (ref 34–48)
HEMOGLOBIN: 13.7 G/DL (ref 11.5–15.5)
IMMATURE GRANULOCYTES #: 0.02 E9/L
IMMATURE GRANULOCYTES %: 0.2 % (ref 0–5)
KETONES, URINE: NEGATIVE MG/DL
LACTIC ACID: 1.6 MMOL/L (ref 0.5–2.2)
LEUKOCYTE ESTERASE, URINE: ABNORMAL
LIPASE: 6 U/L (ref 13–60)
LYMPHOCYTES ABSOLUTE: 3.12 E9/L (ref 1.5–4)
LYMPHOCYTES RELATIVE PERCENT: 31.4 % (ref 20–42)
Lab: NORMAL
MCH RBC QN AUTO: 26 PG (ref 26–35)
MCHC RBC AUTO-ENTMCNC: 32.2 % (ref 32–34.5)
MCV RBC AUTO: 80.6 FL (ref 80–99.9)
METER GLUCOSE: 132 MG/DL (ref 74–99)
METER GLUCOSE: 171 MG/DL (ref 74–99)
METER GLUCOSE: 206 MG/DL (ref 74–99)
METER GLUCOSE: 315 MG/DL (ref 74–99)
MONOCYTES ABSOLUTE: 0.51 E9/L (ref 0.1–0.95)
MONOCYTES RELATIVE PERCENT: 5.1 % (ref 2–12)
NEGATIVE QC PASS/FAIL: NORMAL
NEUTROPHILS ABSOLUTE: 5.99 E9/L (ref 1.8–7.3)
NEUTROPHILS RELATIVE PERCENT: 60.4 % (ref 43–80)
NITRITE, URINE: POSITIVE
PDW BLD-RTO: 12.2 FL (ref 11.5–15)
PH UA: 6 (ref 5–9)
PLATELET # BLD: 308 E9/L (ref 130–450)
PMV BLD AUTO: 10.9 FL (ref 7–12)
POSITIVE QC PASS/FAIL: NORMAL
POTASSIUM REFLEX MAGNESIUM: 4.7 MMOL/L (ref 3.5–5)
PROTEIN UA: 30 MG/DL
RBC # BLD: 5.27 E12/L (ref 3.5–5.5)
RBC UA: ABNORMAL /HPF (ref 0–2)
SODIUM BLD-SCNC: 137 MMOL/L (ref 132–146)
SPECIFIC GRAVITY UA: 1.02 (ref 1–1.03)
TOTAL PROTEIN: 7.7 G/DL (ref 6.4–8.3)
UROBILINOGEN, URINE: 1 E.U./DL
WBC # BLD: 9.9 E9/L (ref 4.5–11.5)
WBC UA: >20 /HPF (ref 0–5)

## 2020-03-05 PROCEDURE — 80053 COMPREHEN METABOLIC PANEL: CPT

## 2020-03-05 PROCEDURE — 2500000003 HC RX 250 WO HCPCS: Performed by: PHYSICIAN ASSISTANT

## 2020-03-05 PROCEDURE — 71046 X-RAY EXAM CHEST 2 VIEWS: CPT

## 2020-03-05 PROCEDURE — 6360000002 HC RX W HCPCS: Performed by: INTERNAL MEDICINE

## 2020-03-05 PROCEDURE — 2580000003 HC RX 258: Performed by: PHYSICIAN ASSISTANT

## 2020-03-05 PROCEDURE — 2580000003 HC RX 258: Performed by: INTERNAL MEDICINE

## 2020-03-05 PROCEDURE — 96375 TX/PRO/DX INJ NEW DRUG ADDON: CPT

## 2020-03-05 PROCEDURE — 82962 GLUCOSE BLOOD TEST: CPT

## 2020-03-05 PROCEDURE — 96365 THER/PROPH/DIAG IV INF INIT: CPT

## 2020-03-05 PROCEDURE — 1200000000 HC SEMI PRIVATE

## 2020-03-05 PROCEDURE — 6370000000 HC RX 637 (ALT 250 FOR IP): Performed by: INTERNAL MEDICINE

## 2020-03-05 PROCEDURE — 6360000004 HC RX CONTRAST MEDICATION: Performed by: RADIOLOGY

## 2020-03-05 PROCEDURE — 81001 URINALYSIS AUTO W/SCOPE: CPT

## 2020-03-05 PROCEDURE — 74177 CT ABD & PELVIS W/CONTRAST: CPT

## 2020-03-05 PROCEDURE — 85025 COMPLETE CBC W/AUTO DIFF WBC: CPT

## 2020-03-05 PROCEDURE — 83605 ASSAY OF LACTIC ACID: CPT

## 2020-03-05 PROCEDURE — 82010 KETONE BODYS QUAN: CPT

## 2020-03-05 PROCEDURE — 99285 EMERGENCY DEPT VISIT HI MDM: CPT

## 2020-03-05 PROCEDURE — 87077 CULTURE AEROBIC IDENTIFY: CPT

## 2020-03-05 PROCEDURE — 87186 SC STD MICRODIL/AGAR DIL: CPT

## 2020-03-05 PROCEDURE — 6360000002 HC RX W HCPCS: Performed by: PHYSICIAN ASSISTANT

## 2020-03-05 PROCEDURE — 83690 ASSAY OF LIPASE: CPT

## 2020-03-05 PROCEDURE — 87088 URINE BACTERIA CULTURE: CPT

## 2020-03-05 RX ORDER — SODIUM CHLORIDE 9 MG/ML
10 INJECTION INTRAVENOUS DAILY
Status: DISCONTINUED | OUTPATIENT
Start: 2020-03-06 | End: 2020-03-10 | Stop reason: HOSPADM

## 2020-03-05 RX ORDER — SODIUM CHLORIDE 9 MG/ML
INJECTION, SOLUTION INTRAVENOUS CONTINUOUS
Status: DISCONTINUED | OUTPATIENT
Start: 2020-03-05 | End: 2020-03-07

## 2020-03-05 RX ORDER — ONDANSETRON 2 MG/ML
4 INJECTION INTRAMUSCULAR; INTRAVENOUS EVERY 6 HOURS PRN
Status: DISCONTINUED | OUTPATIENT
Start: 2020-03-05 | End: 2020-03-10 | Stop reason: HOSPADM

## 2020-03-05 RX ORDER — PANTOPRAZOLE SODIUM 40 MG/10ML
40 INJECTION, POWDER, LYOPHILIZED, FOR SOLUTION INTRAVENOUS DAILY
Status: DISCONTINUED | OUTPATIENT
Start: 2020-03-06 | End: 2020-03-10 | Stop reason: HOSPADM

## 2020-03-05 RX ORDER — ONDANSETRON 4 MG/1
4 TABLET, ORALLY DISINTEGRATING ORAL EVERY 8 HOURS PRN
Status: DISCONTINUED | OUTPATIENT
Start: 2020-03-05 | End: 2020-03-10 | Stop reason: HOSPADM

## 2020-03-05 RX ORDER — ONDANSETRON 2 MG/ML
4 INJECTION INTRAMUSCULAR; INTRAVENOUS ONCE
Status: COMPLETED | OUTPATIENT
Start: 2020-03-05 | End: 2020-03-05

## 2020-03-05 RX ORDER — DEXTROSE MONOHYDRATE 25 G/50ML
12.5 INJECTION, SOLUTION INTRAVENOUS PRN
Status: DISCONTINUED | OUTPATIENT
Start: 2020-03-05 | End: 2020-03-10 | Stop reason: HOSPADM

## 2020-03-05 RX ORDER — INSULIN GLARGINE 100 [IU]/ML
10 INJECTION, SOLUTION SUBCUTANEOUS NIGHTLY
Status: DISCONTINUED | OUTPATIENT
Start: 2020-03-05 | End: 2020-03-06

## 2020-03-05 RX ORDER — 0.9 % SODIUM CHLORIDE 0.9 %
1000 INTRAVENOUS SOLUTION INTRAVENOUS ONCE
Status: COMPLETED | OUTPATIENT
Start: 2020-03-05 | End: 2020-03-05

## 2020-03-05 RX ORDER — SODIUM CHLORIDE 0.9 % (FLUSH) 0.9 %
10 SYRINGE (ML) INJECTION EVERY 12 HOURS SCHEDULED
Status: DISCONTINUED | OUTPATIENT
Start: 2020-03-05 | End: 2020-03-10 | Stop reason: HOSPADM

## 2020-03-05 RX ORDER — SODIUM CHLORIDE 0.9 % (FLUSH) 0.9 %
10 SYRINGE (ML) INJECTION PRN
Status: DISCONTINUED | OUTPATIENT
Start: 2020-03-05 | End: 2020-03-05 | Stop reason: SDUPTHER

## 2020-03-05 RX ORDER — ONDANSETRON 4 MG/1
4 TABLET, ORALLY DISINTEGRATING ORAL ONCE
Status: DISCONTINUED | OUTPATIENT
Start: 2020-03-05 | End: 2020-03-05

## 2020-03-05 RX ORDER — CIPROFLOXACIN 2 MG/ML
400 INJECTION, SOLUTION INTRAVENOUS EVERY 12 HOURS
Status: DISCONTINUED | OUTPATIENT
Start: 2020-03-05 | End: 2020-03-10

## 2020-03-05 RX ORDER — ACETAMINOPHEN 325 MG/1
650 TABLET ORAL EVERY 6 HOURS PRN
Status: DISCONTINUED | OUTPATIENT
Start: 2020-03-05 | End: 2020-03-10 | Stop reason: HOSPADM

## 2020-03-05 RX ORDER — ACETAMINOPHEN 650 MG/1
650 SUPPOSITORY RECTAL EVERY 6 HOURS PRN
Status: DISCONTINUED | OUTPATIENT
Start: 2020-03-05 | End: 2020-03-10 | Stop reason: HOSPADM

## 2020-03-05 RX ORDER — GABAPENTIN 600 MG/1
600 TABLET ORAL 2 TIMES DAILY WITH MEALS
Status: DISCONTINUED | OUTPATIENT
Start: 2020-03-05 | End: 2020-03-10 | Stop reason: HOSPADM

## 2020-03-05 RX ORDER — SODIUM CHLORIDE 0.9 % (FLUSH) 0.9 %
10 SYRINGE (ML) INJECTION PRN
Status: DISCONTINUED | OUTPATIENT
Start: 2020-03-05 | End: 2020-03-10 | Stop reason: HOSPADM

## 2020-03-05 RX ORDER — DEXTROSE MONOHYDRATE 50 MG/ML
100 INJECTION, SOLUTION INTRAVENOUS PRN
Status: DISCONTINUED | OUTPATIENT
Start: 2020-03-05 | End: 2020-03-10 | Stop reason: HOSPADM

## 2020-03-05 RX ORDER — LEVOFLOXACIN 5 MG/ML
500 INJECTION, SOLUTION INTRAVENOUS ONCE
Status: COMPLETED | OUTPATIENT
Start: 2020-03-05 | End: 2020-03-05

## 2020-03-05 RX ORDER — NICOTINE POLACRILEX 4 MG
15 LOZENGE BUCCAL PRN
Status: DISCONTINUED | OUTPATIENT
Start: 2020-03-05 | End: 2020-03-10 | Stop reason: HOSPADM

## 2020-03-05 RX ORDER — GABAPENTIN 600 MG/1
600 TABLET ORAL 2 TIMES DAILY WITH MEALS
Status: DISCONTINUED | OUTPATIENT
Start: 2020-03-06 | End: 2020-03-05

## 2020-03-05 RX ORDER — POLYETHYLENE GLYCOL 3350 17 G/17G
17 POWDER, FOR SOLUTION ORAL DAILY PRN
Status: DISCONTINUED | OUTPATIENT
Start: 2020-03-05 | End: 2020-03-10 | Stop reason: HOSPADM

## 2020-03-05 RX ADMIN — SODIUM CHLORIDE 1000 ML: 9 INJECTION, SOLUTION INTRAVENOUS at 17:32

## 2020-03-05 RX ADMIN — ONDANSETRON HYDROCHLORIDE 4 MG: 2 SOLUTION INTRAMUSCULAR; INTRAVENOUS at 23:37

## 2020-03-05 RX ADMIN — INSULIN GLARGINE 10 UNITS: 100 INJECTION, SOLUTION SUBCUTANEOUS at 23:17

## 2020-03-05 RX ADMIN — SODIUM CHLORIDE 1000 ML: 9 INJECTION, SOLUTION INTRAVENOUS at 14:41

## 2020-03-05 RX ADMIN — IOPAMIDOL 110 ML: 755 INJECTION, SOLUTION INTRAVENOUS at 15:18

## 2020-03-05 RX ADMIN — SODIUM CHLORIDE: 9 INJECTION, SOLUTION INTRAVENOUS at 23:06

## 2020-03-05 RX ADMIN — INSULIN LISPRO 3 UNITS: 100 INJECTION, SOLUTION INTRAVENOUS; SUBCUTANEOUS at 23:19

## 2020-03-05 RX ADMIN — SODIUM CHLORIDE 1000 ML: 9 INJECTION, SOLUTION INTRAVENOUS at 13:11

## 2020-03-05 RX ADMIN — METRONIDAZOLE 500 MG: 500 INJECTION, SOLUTION INTRAVENOUS at 17:33

## 2020-03-05 RX ADMIN — LEVOFLOXACIN 500 MG: 5 INJECTION, SOLUTION INTRAVENOUS at 18:00

## 2020-03-05 RX ADMIN — ONDANSETRON 4 MG: 2 INJECTION INTRAMUSCULAR; INTRAVENOUS at 13:11

## 2020-03-05 RX ADMIN — SODIUM CHLORIDE 1000 ML: 9 INJECTION, SOLUTION INTRAVENOUS at 23:23

## 2020-03-05 RX ADMIN — GABAPENTIN 600 MG: 600 TABLET, FILM COATED ORAL at 23:36

## 2020-03-05 ASSESSMENT — ENCOUNTER SYMPTOMS
FACIAL SWELLING: 0
COUGH: 0
CHEST TIGHTNESS: 0
VOMITING: 1
NAUSEA: 1
BACK PAIN: 0
SORE THROAT: 0
SINUS PRESSURE: 0
TROUBLE SWALLOWING: 0
SHORTNESS OF BREATH: 0
ABDOMINAL PAIN: 1
COLOR CHANGE: 0
SINUS PAIN: 0
DIARRHEA: 1
CONSTIPATION: 0

## 2020-03-05 ASSESSMENT — PAIN SCALES - GENERAL
PAINLEVEL_OUTOF10: 8
PAINLEVEL_OUTOF10: 0
PAINLEVEL_OUTOF10: 0

## 2020-03-05 ASSESSMENT — PAIN DESCRIPTION - DESCRIPTORS: DESCRIPTORS: ACHING

## 2020-03-05 ASSESSMENT — PAIN DESCRIPTION - PAIN TYPE: TYPE: ACUTE PAIN

## 2020-03-05 ASSESSMENT — PAIN DESCRIPTION - LOCATION: LOCATION: ABDOMEN

## 2020-03-05 ASSESSMENT — PAIN DESCRIPTION - FREQUENCY: FREQUENCY: CONTINUOUS

## 2020-03-05 NOTE — ED PROVIDER NOTES
HISTORY ---------------------------------------------  Past Medical History:  has a past medical history of Diabetes mellitus ([facility] [address] Past Surgical History:  has a past surgical history that includes Cholecystectomy. Social History:  reports that she is a non-smoker but has been exposed to tobacco smoke. She has never used smokeless tobacco. She reports that she does not drink alcohol or use drugs. Family History: family history is not on file. The patients home medications have been reviewed.     Allergies: Rocephin [ceftriaxone]    -------------------------------------------------- RESULTS -------------------------------------------------  All laboratory and radiology results have been personally reviewed by myself   LABS:  Results for orders placed or performed during the hospital encounter of 03/05/20   Comprehensive Metabolic Panel w/ Reflex to MG   Result Value Ref Range    Sodium 137 132 - 146 mmol/L    Potassium reflex Magnesium 4.7 3.5 - 5.0 mmol/L    Chloride 101 98 - 107 mmol/L    CO2 23 22 - 29 mmol/L    Anion Gap 13 7 - 16 mmol/L    Glucose 158 (H) 74 - 99 mg/dL    BUN 6 6 - 20 mg/dL    CREATININE 1.0 0.5 - 1.0 mg/dL    GFR Non-African American >60 >=60 mL/min/1.73    GFR African American >60     Calcium 8.8 8.6 - 10.2 mg/dL    Total Protein 7.7 6.4 - 8.3 g/dL    Alb 3.3 (L) 3.5 - 5.2 g/dL    Total Bilirubin 0.8 0.0 - 1.2 mg/dL    Alkaline Phosphatase 108 (H) 35 - 104 U/L    ALT 20 0 - 32 U/L    AST 35 (H) 0 - 31 U/L   CBC Auto Differential   Result Value Ref Range    WBC 9.9 4.5 - 11.5 E9/L    RBC 5.27 3.50 - 5.50 E12/L    Hemoglobin 13.7 11.5 - 15.5 g/dL    Hematocrit 42.5 34.0 - 48.0 %    MCV 80.6 80.0 - 99.9 fL    MCH 26.0 26.0 - 35.0 pg    MCHC 32.2 32.0 - 34.5 %    RDW 12.2 11.5 - 15.0 fL    Platelets 080 372 - 284 E9/L    MPV 10.9 7.0 - 12.0 fL    Neutrophils % 60.4 43.0 - 80.0 %    Immature Granulocytes % 0.2 0.0 - 5.0 %    Lymphocytes % 31.4 20.0 - 42.0 %    Monocytes % 5.1 2.0 - 12.0 %    Eosinophils % 2.3 0.0 - 6.0 %    Basophils % 0.6 0.0 - 2.0 %    Neutrophils Absolute 5.99 1.80 - 7.30 E9/L    Immature Granulocytes # 0.02 E9/L    Lymphocytes Absolute 3.12 1.50 - 4.00 E9/L    Monocytes Absolute 0.51 0.10 - 0.95 E9/L    Eosinophils Absolute 0.23 0.05 - 0.50 E9/L    Basophils Absolute 0.06 0.00 - 0.20 E9/L   Lactic Acid, Plasma   Result Value Ref Range    Lactic Acid 1.6 0.5 - 2.2 mmol/L   Urinalysis, reflex to microscopic   Result Value Ref Range    Color, UA Yellow Straw/Yellow    Clarity, UA TURBID (A) Clear    Glucose, Ur Negative Negative mg/dL    Bilirubin Urine Negative Negative    Ketones, Urine Negative Negative mg/dL    Specific Gravity, UA 1.020 1.005 - 1.030    Blood, Urine SMALL (A) Negative    pH, UA 6.0 5.0 - 9.0    Protein, UA 30 (A) Negative mg/dL    Urobilinogen, Urine 1.0 <2.0 E.U./dL    Nitrite, Urine POSITIVE (A) Negative    Leukocyte Esterase, Urine LARGE (A) Negative   Lipase   Result Value Ref Range    Lipase 6 (L) 13 - 60 U/L   Microscopic Urinalysis   Result Value Ref Range    WBC, UA >20 0 - 5 /HPF    RBC, UA 2-5 0 - 2 /HPF    Bacteria, UA MODERATE (A) None Seen /HPF   POC Pregnancy Urine   Result Value Ref Range    HCG, Urine, POC Negative Negative    Lot Number [id number]     Positive QC Pass/Fail Pass     Negative QC Pass/Fail Pass    POCT glucose   Result Value Ref Range    Glucose 132 mg/dL    QC OK? yes    POCT Glucose   Result Value Ref Range    Meter Glucose 132 (H) 74 - 99 mg/dL   POCT Glucose   Result Value Ref Range    Meter Glucose 171 (H) 74 - 99 mg/dL   POCT Glucose   Result Value Ref Range    Glucose 206 mg/dL    QC OK? y    POCT Glucose   Result Value Ref Range    Meter Glucose 206 (H) 74 - 99 mg/dL       RADIOLOGY:  Interpreted by Radiologist.  XR CHEST STANDARD (2 VW)   Final Result   No acute cardiopulmonary process.       CT ABDOMEN PELVIS W IV CONTRAST Additional Contrast? None   Final Result      Numerous loops of jejunum within the left and Abdomen is soft. Comments: The abdomen is soft. There is very mild tenderness diffusely. No distention. No guarding. Musculoskeletal: Normal range of motion. General: No tenderness or deformity. Skin:     General: Skin is warm and dry. Capillary Refill: Capillary refill takes less than 2 seconds. Coloration: Skin is not pale. Findings: No erythema. Neurological:      Mental Status: She is alert and oriented to person, place, and time. Mental status is at baseline. Motor: No weakness. Psychiatric:         Mood and Affect: Mood normal.         Behavior: Behavior normal.         Thought Content: Thought content normal.            ------------------------------ ED COURSE/MEDICAL DECISION MAKING----------------------  Medications   sodium chloride flush 0.9 % injection 10 mL (has no administration in time range)   levofloxacin (LEVAQUIN) 500 MG/100ML infusion 500 mg (500 mg Intravenous New Bag 3/5/20 1800)   0.9 % sodium chloride bolus (1,000 mLs Intravenous New Bag 3/5/20 1732)   0.9 % sodium chloride bolus (0 mLs Intravenous Stopped 3/5/20 1430)   ondansetron (ZOFRAN) injection 4 mg (4 mg Intravenous Given 3/5/20 1311)   0.9 % sodium chloride bolus (0 mLs Intravenous Stopped 3/5/20 1541)   iopamidol (ISOVUE-370) 76 % injection 110 mL (110 mLs Intravenous Given 3/5/20 1518)   metronidazole (FLAGYL) 500 mg in NaCl 100 mL IVPB premix (0 mg Intravenous Stopped 3/5/20 1800)         ED COURSE:      XR CHEST STANDARD (2 VW)   Final Result   No acute cardiopulmonary process. CT ABDOMEN PELVIS W IV CONTRAST Additional Contrast? None   Final Result      Numerous loops of jejunum within the left and midabdomen show   thickened enhancing mucosa and there is surrounding inflammatory   induration and fluid. These findings are commonly seen in autoimmune   related inflammatory bowel disease such as Crohn's or ulcerative   colitis.  Infectious enteritis could result in a similar

## 2020-03-06 LAB
ANION GAP SERPL CALCULATED.3IONS-SCNC: 9 MMOL/L (ref 7–16)
BASOPHILS ABSOLUTE: 0.04 E9/L (ref 0–0.2)
BASOPHILS RELATIVE PERCENT: 0.6 % (ref 0–2)
BUN BLDV-MCNC: 7 MG/DL (ref 6–20)
C-REACTIVE PROTEIN: 1.8 MG/DL (ref 0–0.4)
CALCIUM SERPL-MCNC: 8.1 MG/DL (ref 8.6–10.2)
CHLORIDE BLD-SCNC: 105 MMOL/L (ref 98–107)
CO2: 23 MMOL/L (ref 22–29)
CORTISOL TOTAL: 3.25 MCG/DL (ref 2.68–18.4)
CREAT SERPL-MCNC: 1.1 MG/DL (ref 0.5–1)
EOSINOPHILS ABSOLUTE: 0.23 E9/L (ref 0.05–0.5)
EOSINOPHILS RELATIVE PERCENT: 3.2 % (ref 0–6)
GFR AFRICAN AMERICAN: >60
GFR NON-AFRICAN AMERICAN: 56 ML/MIN/1.73
GLUCOSE BLD-MCNC: 238 MG/DL (ref 74–99)
HBA1C MFR BLD: 10.3 % (ref 4–5.6)
HCT VFR BLD CALC: 36 % (ref 34–48)
HEMOGLOBIN: 11.3 G/DL (ref 11.5–15.5)
IMMATURE GRANULOCYTES #: 0.01 E9/L
IMMATURE GRANULOCYTES %: 0.1 % (ref 0–5)
LACTIC ACID: 1.1 MMOL/L (ref 0.5–2.2)
LYMPHOCYTES ABSOLUTE: 2.31 E9/L (ref 1.5–4)
LYMPHOCYTES RELATIVE PERCENT: 31.8 % (ref 20–42)
MAGNESIUM: 2 MG/DL (ref 1.6–2.6)
MCH RBC QN AUTO: 25.9 PG (ref 26–35)
MCHC RBC AUTO-ENTMCNC: 31.4 % (ref 32–34.5)
MCV RBC AUTO: 82.4 FL (ref 80–99.9)
METER GLUCOSE: 151 MG/DL (ref 74–99)
METER GLUCOSE: 167 MG/DL (ref 74–99)
METER GLUCOSE: 212 MG/DL (ref 74–99)
METER GLUCOSE: 216 MG/DL (ref 74–99)
MONOCYTES ABSOLUTE: 0.47 E9/L (ref 0.1–0.95)
MONOCYTES RELATIVE PERCENT: 6.5 % (ref 2–12)
NEUTROPHILS ABSOLUTE: 4.2 E9/L (ref 1.8–7.3)
NEUTROPHILS RELATIVE PERCENT: 57.8 % (ref 43–80)
PDW BLD-RTO: 12.1 FL (ref 11.5–15)
PLATELET # BLD: 255 E9/L (ref 130–450)
PMV BLD AUTO: 11.4 FL (ref 7–12)
POTASSIUM SERPL-SCNC: 4.4 MMOL/L (ref 3.5–5)
PROCALCITONIN: 0.05 NG/ML (ref 0–0.08)
RBC # BLD: 4.37 E12/L (ref 3.5–5.5)
SEDIMENTATION RATE, ERYTHROCYTE: 46 MM/HR (ref 0–20)
SODIUM BLD-SCNC: 137 MMOL/L (ref 132–146)
WBC # BLD: 7.3 E9/L (ref 4.5–11.5)

## 2020-03-06 PROCEDURE — 36415 COLL VENOUS BLD VENIPUNCTURE: CPT

## 2020-03-06 PROCEDURE — 83036 HEMOGLOBIN GLYCOSYLATED A1C: CPT

## 2020-03-06 PROCEDURE — 83735 ASSAY OF MAGNESIUM: CPT

## 2020-03-06 PROCEDURE — 82962 GLUCOSE BLOOD TEST: CPT

## 2020-03-06 PROCEDURE — 6370000000 HC RX 637 (ALT 250 FOR IP): Performed by: INTERNAL MEDICINE

## 2020-03-06 PROCEDURE — 85025 COMPLETE CBC W/AUTO DIFF WBC: CPT

## 2020-03-06 PROCEDURE — 86337 INSULIN ANTIBODIES: CPT

## 2020-03-06 PROCEDURE — 83605 ASSAY OF LACTIC ACID: CPT

## 2020-03-06 PROCEDURE — 86140 C-REACTIVE PROTEIN: CPT

## 2020-03-06 PROCEDURE — 82533 TOTAL CORTISOL: CPT

## 2020-03-06 PROCEDURE — 99222 1ST HOSP IP/OBS MODERATE 55: CPT | Performed by: INTERNAL MEDICINE

## 2020-03-06 PROCEDURE — 85651 RBC SED RATE NONAUTOMATED: CPT

## 2020-03-06 PROCEDURE — 80048 BASIC METABOLIC PNL TOTAL CA: CPT

## 2020-03-06 PROCEDURE — 84145 PROCALCITONIN (PCT): CPT

## 2020-03-06 PROCEDURE — 83516 IMMUNOASSAY NONANTIBODY: CPT

## 2020-03-06 PROCEDURE — C9113 INJ PANTOPRAZOLE SODIUM, VIA: HCPCS | Performed by: INTERNAL MEDICINE

## 2020-03-06 PROCEDURE — 6360000002 HC RX W HCPCS: Performed by: INTERNAL MEDICINE

## 2020-03-06 PROCEDURE — 2500000003 HC RX 250 WO HCPCS: Performed by: INTERNAL MEDICINE

## 2020-03-06 PROCEDURE — 2580000003 HC RX 258: Performed by: INTERNAL MEDICINE

## 2020-03-06 PROCEDURE — 1200000000 HC SEMI PRIVATE

## 2020-03-06 RX ORDER — INSULIN GLARGINE 100 [IU]/ML
20 INJECTION, SOLUTION SUBCUTANEOUS NIGHTLY
Status: DISCONTINUED | OUTPATIENT
Start: 2020-03-06 | End: 2020-03-09

## 2020-03-06 RX ADMIN — GABAPENTIN 600 MG: 600 TABLET, FILM COATED ORAL at 09:47

## 2020-03-06 RX ADMIN — CIPROFLOXACIN 400 MG: 2 INJECTION, SOLUTION INTRAVENOUS at 00:20

## 2020-03-06 RX ADMIN — SODIUM CHLORIDE, PRESERVATIVE FREE 10 ML: 5 INJECTION INTRAVENOUS at 21:43

## 2020-03-06 RX ADMIN — SODIUM CHLORIDE, PRESERVATIVE FREE 10 ML: 5 INJECTION INTRAVENOUS at 09:47

## 2020-03-06 RX ADMIN — GABAPENTIN 600 MG: 600 TABLET, FILM COATED ORAL at 17:19

## 2020-03-06 RX ADMIN — CIPROFLOXACIN 400 MG: 2 INJECTION, SOLUTION INTRAVENOUS at 12:31

## 2020-03-06 RX ADMIN — SODIUM CHLORIDE: 9 INJECTION, SOLUTION INTRAVENOUS at 17:17

## 2020-03-06 RX ADMIN — INSULIN GLARGINE 20 UNITS: 100 INJECTION, SOLUTION SUBCUTANEOUS at 21:46

## 2020-03-06 RX ADMIN — METRONIDAZOLE 500 MG: 500 INJECTION, SOLUTION INTRAVENOUS at 01:27

## 2020-03-06 RX ADMIN — METRONIDAZOLE 500 MG: 500 INJECTION, SOLUTION INTRAVENOUS at 09:46

## 2020-03-06 RX ADMIN — PANTOPRAZOLE SODIUM 40 MG: 40 INJECTION, POWDER, FOR SOLUTION INTRAVENOUS at 09:47

## 2020-03-06 RX ADMIN — INSULIN LISPRO 2 UNITS: 100 INJECTION, SOLUTION INTRAVENOUS; SUBCUTANEOUS at 17:35

## 2020-03-06 RX ADMIN — INSULIN LISPRO 1 UNITS: 100 INJECTION, SOLUTION INTRAVENOUS; SUBCUTANEOUS at 21:48

## 2020-03-06 RX ADMIN — SODIUM CHLORIDE: 9 INJECTION, SOLUTION INTRAVENOUS at 00:20

## 2020-03-06 RX ADMIN — ENOXAPARIN SODIUM 40 MG: 40 INJECTION, SOLUTION INTRAVENOUS; SUBCUTANEOUS at 09:46

## 2020-03-06 RX ADMIN — SODIUM CHLORIDE, PRESERVATIVE FREE 10 ML: 5 INJECTION INTRAVENOUS at 09:46

## 2020-03-06 RX ADMIN — CIPROFLOXACIN 400 MG: 2 INJECTION, SOLUTION INTRAVENOUS at 22:30

## 2020-03-06 RX ADMIN — INSULIN LISPRO 4 UNITS: 100 INJECTION, SOLUTION INTRAVENOUS; SUBCUTANEOUS at 12:33

## 2020-03-06 RX ADMIN — METRONIDAZOLE 500 MG: 500 INJECTION, SOLUTION INTRAVENOUS at 17:19

## 2020-03-06 RX ADMIN — INSULIN LISPRO 4 UNITS: 100 INJECTION, SOLUTION INTRAVENOUS; SUBCUTANEOUS at 09:48

## 2020-03-06 ASSESSMENT — PAIN SCALES - GENERAL
PAINLEVEL_OUTOF10: 0
PAINLEVEL_OUTOF10: 2

## 2020-03-06 ASSESSMENT — PAIN DESCRIPTION - ORIENTATION: ORIENTATION: MID

## 2020-03-06 ASSESSMENT — PAIN DESCRIPTION - PAIN TYPE: TYPE: ACUTE PAIN

## 2020-03-06 ASSESSMENT — PAIN DESCRIPTION - LOCATION: LOCATION: ABDOMEN

## 2020-03-06 NOTE — H&P
rhinorrhea. · Respiratory: No cough, no sputum production, no pleuritic chest pain, no shortness of breath  · Cardiology: No angina, no dyspnea on exertion, no paroxysmal nocturnal dyspnea, no orthopnea, no palpitation, no leg swelling.    · Gastroenterology: NVD C, diffuse abdominal pain and discomfort with feeding  · Genitourinary: Dysuria and pyuria  · Musculoskeletal: no joint pain, no myalgia, no change in range of movement  · Neurology: no focal weakness in extremities, no slurred speech, no double vision, no tingling or numbness sensation  · Endocrinology: no temperature intolerance, no polyphagia, polydipsia or polyuria  · Hematology: no increased bleeding, no bruising, no lymphadenopathy  · Skin: no skin changes noticed by patient  · Psychology: no depressed mood, no suicidal ideation    Physical Exam   · Vitals: BP 95/60   Pulse 80   Temp 98.3 °F (36.8 °C)   Resp 18   Wt 200 lb (90.7 kg)   SpO2 96%   BMI 36.58 kg/m²     · General Appearance: alert and oriented to person, place and time, well developed and well- nourished, in no acute distress  · Skin: warm and dry, no rash or erythema  · Head: normocephalic and atraumatic  · Eyes: pupils equal, round, and reactive to light, extraocular eye movements intact, conjunctivae normal  · Neck: supple and non-tender without mass, no thyromegaly or thyroid nodules, no cervical lymphadenopathy  · Pulmonary/Chest: clear to auscultation bilaterally- no wheezes, rales or rhonchi, normal air movement, no respiratory distress  · Cardiovascular: normal rate, regular rhythm, normal S1 and S2, no murmurs, rubs, clicks, or gallops, distal pulses intact, no carotid bruits  · Abdomen: BSx4, tender to palpation; especially LUQ  · Extremities: Healing wounds under 2nd and on 4th digit of right foot and   · Musculoskeletal: normal range of motion, no joint swelling, deformity or tenderness  · Neurologic: reflexes normal and symmetric, no cranial nerve deficit, gait, coordination and speech normal   Labs and Imaging Studies   Basic Labs  Recent Labs     20  1204 20  1227 20  1758     --   --    K 4.7  --   --      --   --    CO2 23  --   --    BUN 6  --   --    CREATININE 1.0  --   --    GLUCOSE 158* 132 206   CALCIUM 8.8  --   --        Recent Labs     20  1204   WBC 9.9   RBC 5.27   HGB 13.7   HCT 42.5   MCV 80.6   MCH 26.0   MCHC 32.2   RDW 12.2      MPV 10.9       CBC:   Lab Results   Component Value Date    WBC 9.9 2020    RBC 5.27 2020    HGB 13.7 2020    HCT 42.5 2020    MCV 80.6 2020    RDW 12.2 2020     2020     CMP:  Lab Results   Component Value Date     2020    K 4.7 2020     2020    CO2 23 2020    BUN 6 2020    PROT 7.7 2020       Imaging Studies:     Xr Chest Standard (2 Vw)    Result Date: 3/5/2020  Patient MRN:  [id number] : 1985 Age: 37 years Gender: Female Order Date:  3/5/2020 3:15 PM TECHNIQUE/NUMBER OF IMAGES/COMPARISON/CLINICAL HISTORY: Chest lateral views 2 images 2 views Comparison August and 2019 Cough. FINDINGS: Lungs are normally expanded. The heart has normal size. Mediastinum appear unremarkable. There is no perihilar vascular congestion. The No acute infiltrates, consolidations or pleural effusions seen. There is no perihilar vascular congestion. No acute cardiopulmonary process. Ct Abdomen Pelvis W Iv Contrast Additional Contrast? None    Result Date: 3/5/2020  This examination and all examinations utilizing ionizing radiation at this facility are done so according to the ALARA (as low as reasonably achievable) principal for radiation dose reduction. Clinical indications: Abdominal pain. Nausea. Decreased appetite. COMPARISON: 2019. 2019.  TECHNIQUE: Axial, sagittal, and coronal computed tomography of the abdomen and pelvis was performed following intravenous administration of 110 mL of Isovue-370. FINDINGS: Mild diffuse increased interstitial thickness as compared to the prior study. 4 mm nodule within the left lower lobe is unchanged. Lung bases are otherwise negative for dominant mass or airspace consolidation. The heart is not enlarged. There is no evidence of pericardial fluid. Within the abdomen, the liver is negative for focal or diffuse abnormality. The gallbladder is surgically absent. There is no worrisome dilation of the hepatobiliary tree. The pancreas is unremarkable. The spleen is negative for focal or diffuse lesion. Adrenal glands show no evidence of thickening or nodule. The kidneys are negative for evidence of solid or cystic mass. There is no evidence of hydronephrosis. Numerous loops of jejunum within the left and midabdomen show thickened enhancing mucosa and there is surrounding inflammatory induration and fluid. These findings are commonly seen in autoimmune related inflammatory bowel disease such as Crohn's or ulcerative colitis. Infectious enteritis could result in a similar appearance as could less likely, ischemia or malignancy. There is no evidence of free air or gastrointestinal obstruction. There is no evidence for appendicitis. Within the pelvis, urinary bladder is thick-walled and slightly trabeculated. Uterus is unremarkable. There is free fluid in the pelvis. Skeletal structures are negative for evidence of aggressive process or acute fracture. There are minimal degenerative changes. The arterial and venous structures enhance appropriately. Numerous loops of jejunum within the left and midabdomen show thickened enhancing mucosa and there is surrounding inflammatory induration and fluid. These findings are commonly seen in autoimmune related inflammatory bowel disease such as Crohn's or ulcerative colitis. Infectious enteritis could result in a similar appearance as could less likely, ischemia or malignancy.  Thick-walled trabeculated urinary bladder which

## 2020-03-06 NOTE — PROGRESS NOTES
[facility]  Internal Medicine Residency / House Medicine Service      Initial H and P    Attending Physician Statement  I have discussed the case, including pertinent history and exam findings with the resident and the team. I have reviewed all significant past medical history, surgical history, social history, family history, allergies, and home medications independently. I have seen and examined the patient and the key elements of the encounter have been performed by me. I agree with the assessment, plan and orders as documented by the resident. Case Discussed During AM Rounds    Presents with complaints of Nausea and vomiting and diarrhea for 4 days    Initially hypotensive- improving with IVFs   No noted lactic acidosis    Vomiting and diarrhea have resolved    + Nausea but improving    Wants to increase diet- carb control as tolerated    LUQ abdominal pain- minor on deep palpation; Good bowel sounds; no peritoneal signs     Hypotension- improving with IVFs   Likely due to ileitis   No respiratory array or influenza rapid was completed   No cough; no fevers; no chills   Diarrhea has resolved    Vomiting has resolved    Continue to monitor     ? Ileitis- noted on CT imaging    Advance diet slowly- carb control    GI consultation due to findings   Hx of Type I DM    Consider inflammatory vs. Infectious etiology   Unlikely concerns for ischemia    Empiric atbs continued   Patient tolerating    Continue to monitor     UTI- urine culture pending    Atbs continued     Type II DM- uncontrolled    Continue insulin regimen   Adjust with change in diet for improved control    Need close outpatient follow-up    Disposition- follows with [facility] on [facility]- will need close outpatient follow-up upon DC. Remainder of medical problems as per resident note.       [facility]  Internal Medicine Residency Faculty

## 2020-03-06 NOTE — PROGRESS NOTES
03/05/2020    CREATININE 1.1 03/06/2020    CALCIUM 8.1 03/06/2020    GFRAA >60 03/06/2020    LABGLOM 56 03/06/2020    GLUCOSE 238 03/06/2020     Magnesium:    Lab Results   Component Value Date    MG 2.0 03/06/2020     Phosphorus:  No results found for: PHOS    Resident's Assessment and Plan     Pt is a 37year old female with PMH of IDDM type I, polyneuropathy and GERD came in with cc of N/V/D    Uncomplicated UTI  C/o burning micturition  UA +  On cipro 400 Q12    ileitis  2/2 inflammatory vs infec vs chronic PPI  No diarrhea today  On cipro and flagyl  Stool cx and leukocytes pending  Referral to GI today    Hypotension  On admission  From diarrhea vs poor oral intake  Received 4 L of IVF  Cortisol WNL  On IVF cont  orthostats neg    IDDM  Dx at age 19  Takes lantus 20-30 at home  Hypoglycemic episodes on 40+ lantus  On diabetic diet  A1c 10.1  Uncontrolled  Increased to 20 today  Will send in labs to evaluate DM type I      GERD  On omeprazole at home  PPI    Polyneuropathy  Gabapentin 600 BID    Core measures:     Code status:full  PT/OT evaluation:not needed  DVT prophylaxis:lovenox  GI prophylaxis: PPI  Disposition: home    Ger Barnes M.D.   Internal Medicine Resident - PGY 1    Attending physician: Dr. Yvonnie Heimlich

## 2020-03-06 NOTE — PLAN OF CARE
Problem: Cardiac Output - Decreased:  Goal: Hemodynamic stability will improve  Description  Hemodynamic stability will improve  Outcome: Met This Shift     Problem: Fluid Volume - Imbalance:  Goal: Absence of imbalanced fluid volume signs and symptoms  Description  Absence of imbalanced fluid volume signs and symptoms  Outcome: Met This Shift     Problem: Nausea/Vomiting:  Goal: Absence of nausea/vomiting  Description  Absence of nausea/vomiting  3/6/2020 1134 by Steve Houston RN  Outcome: Met This Shift  3/5/2020 2344 by Aimee Scott RN  Outcome: Ongoing  3/5/2020 2308 by Aimee Scott RN  Outcome: Ongoing

## 2020-03-07 LAB
ANION GAP SERPL CALCULATED.3IONS-SCNC: 12 MMOL/L (ref 7–16)
BASOPHILS ABSOLUTE: 0.05 E9/L (ref 0–0.2)
BASOPHILS RELATIVE PERCENT: 0.8 % (ref 0–2)
BILIRUBIN URINE: NEGATIVE
BLOOD, URINE: NEGATIVE
BUN BLDV-MCNC: 7 MG/DL (ref 6–20)
CALCIUM SERPL-MCNC: 8.3 MG/DL (ref 8.6–10.2)
CHLORIDE BLD-SCNC: 105 MMOL/L (ref 98–107)
CLARITY: CLEAR
CO2: 23 MMOL/L (ref 22–29)
COLOR: YELLOW
CREAT SERPL-MCNC: 1.2 MG/DL (ref 0.5–1)
EOSINOPHILS ABSOLUTE: 0.24 E9/L (ref 0.05–0.5)
EOSINOPHILS RELATIVE PERCENT: 3.8 % (ref 0–6)
GFR AFRICAN AMERICAN: >60
GFR NON-AFRICAN AMERICAN: 51 ML/MIN/1.73
GLUCOSE BLD-MCNC: 232 MG/DL (ref 74–99)
GLUCOSE URINE: >=1000 MG/DL
HCT VFR BLD CALC: 35.7 % (ref 34–48)
HEMOGLOBIN: 11.1 G/DL (ref 11.5–15.5)
IMMATURE GRANULOCYTES #: 0.01 E9/L
IMMATURE GRANULOCYTES %: 0.2 % (ref 0–5)
KETONES, URINE: ABNORMAL MG/DL
LEUKOCYTE ESTERASE, URINE: NEGATIVE
LYMPHOCYTES ABSOLUTE: 2.34 E9/L (ref 1.5–4)
LYMPHOCYTES RELATIVE PERCENT: 37 % (ref 20–42)
MCH RBC QN AUTO: 26 PG (ref 26–35)
MCHC RBC AUTO-ENTMCNC: 31.1 % (ref 32–34.5)
MCV RBC AUTO: 83.6 FL (ref 80–99.9)
METER GLUCOSE: 140 MG/DL (ref 74–99)
METER GLUCOSE: 147 MG/DL (ref 74–99)
METER GLUCOSE: 267 MG/DL (ref 74–99)
METER GLUCOSE: 268 MG/DL (ref 74–99)
MONOCYTES ABSOLUTE: 0.37 E9/L (ref 0.1–0.95)
MONOCYTES RELATIVE PERCENT: 5.9 % (ref 2–12)
NEUTROPHILS ABSOLUTE: 3.31 E9/L (ref 1.8–7.3)
NEUTROPHILS RELATIVE PERCENT: 52.3 % (ref 43–80)
NITRITE, URINE: NEGATIVE
PDW BLD-RTO: 12.5 FL (ref 11.5–15)
PH UA: 6 (ref 5–9)
PLATELET # BLD: 238 E9/L (ref 130–450)
PMV BLD AUTO: 11.9 FL (ref 7–12)
POTASSIUM SERPL-SCNC: 3.9 MMOL/L (ref 3.5–5)
PROTEIN UA: NEGATIVE MG/DL
RBC # BLD: 4.27 E12/L (ref 3.5–5.5)
SODIUM BLD-SCNC: 140 MMOL/L (ref 132–146)
SPECIFIC GRAVITY UA: 1.01 (ref 1–1.03)
UROBILINOGEN, URINE: 0.2 E.U./DL
WBC # BLD: 6.3 E9/L (ref 4.5–11.5)

## 2020-03-07 PROCEDURE — 86341 ISLET CELL ANTIBODY: CPT

## 2020-03-07 PROCEDURE — 1200000000 HC SEMI PRIVATE

## 2020-03-07 PROCEDURE — 2580000003 HC RX 258: Performed by: INTERNAL MEDICINE

## 2020-03-07 PROCEDURE — 80048 BASIC METABOLIC PNL TOTAL CA: CPT

## 2020-03-07 PROCEDURE — 36415 COLL VENOUS BLD VENIPUNCTURE: CPT

## 2020-03-07 PROCEDURE — 6370000000 HC RX 637 (ALT 250 FOR IP): Performed by: INTERNAL MEDICINE

## 2020-03-07 PROCEDURE — 2500000003 HC RX 250 WO HCPCS: Performed by: INTERNAL MEDICINE

## 2020-03-07 PROCEDURE — C9113 INJ PANTOPRAZOLE SODIUM, VIA: HCPCS | Performed by: INTERNAL MEDICINE

## 2020-03-07 PROCEDURE — 6360000002 HC RX W HCPCS: Performed by: INTERNAL MEDICINE

## 2020-03-07 PROCEDURE — 82962 GLUCOSE BLOOD TEST: CPT

## 2020-03-07 PROCEDURE — 99231 SBSQ HOSP IP/OBS SF/LOW 25: CPT | Performed by: INTERNAL MEDICINE

## 2020-03-07 PROCEDURE — 81003 URINALYSIS AUTO W/O SCOPE: CPT

## 2020-03-07 PROCEDURE — 85025 COMPLETE CBC W/AUTO DIFF WBC: CPT

## 2020-03-07 RX ORDER — 0.9 % SODIUM CHLORIDE 0.9 %
1000 INTRAVENOUS SOLUTION INTRAVENOUS ONCE
Status: COMPLETED | OUTPATIENT
Start: 2020-03-07 | End: 2020-03-07

## 2020-03-07 RX ADMIN — INSULIN LISPRO 6 UNITS: 100 INJECTION, SOLUTION INTRAVENOUS; SUBCUTANEOUS at 09:52

## 2020-03-07 RX ADMIN — POLYETHYLENE GLYCOL 3350 17 G: 17 POWDER, FOR SOLUTION ORAL at 15:37

## 2020-03-07 RX ADMIN — INSULIN GLARGINE 20 UNITS: 100 INJECTION, SOLUTION SUBCUTANEOUS at 20:17

## 2020-03-07 RX ADMIN — ACETAMINOPHEN 650 MG: 325 TABLET ORAL at 20:22

## 2020-03-07 RX ADMIN — SODIUM CHLORIDE 1000 ML: 9 INJECTION, SOLUTION INTRAVENOUS at 11:32

## 2020-03-07 RX ADMIN — PANTOPRAZOLE SODIUM 40 MG: 40 INJECTION, POWDER, FOR SOLUTION INTRAVENOUS at 09:44

## 2020-03-07 RX ADMIN — METRONIDAZOLE 500 MG: 500 INJECTION, SOLUTION INTRAVENOUS at 09:44

## 2020-03-07 RX ADMIN — SODIUM CHLORIDE, PRESERVATIVE FREE 10 ML: 5 INJECTION INTRAVENOUS at 09:44

## 2020-03-07 RX ADMIN — SODIUM CHLORIDE, PRESERVATIVE FREE 10 ML: 5 INJECTION INTRAVENOUS at 20:18

## 2020-03-07 RX ADMIN — ENOXAPARIN SODIUM 40 MG: 40 INJECTION, SOLUTION INTRAVENOUS; SUBCUTANEOUS at 09:44

## 2020-03-07 RX ADMIN — INSULIN LISPRO 2 UNITS: 100 INJECTION, SOLUTION INTRAVENOUS; SUBCUTANEOUS at 17:36

## 2020-03-07 RX ADMIN — GABAPENTIN 600 MG: 600 TABLET, FILM COATED ORAL at 17:36

## 2020-03-07 RX ADMIN — METRONIDAZOLE 500 MG: 500 INJECTION, SOLUTION INTRAVENOUS at 00:50

## 2020-03-07 RX ADMIN — GABAPENTIN 600 MG: 600 TABLET, FILM COATED ORAL at 09:44

## 2020-03-07 RX ADMIN — INSULIN LISPRO 6 UNITS: 100 INJECTION, SOLUTION INTRAVENOUS; SUBCUTANEOUS at 13:05

## 2020-03-07 RX ADMIN — ONDANSETRON HYDROCHLORIDE 4 MG: 2 SOLUTION INTRAMUSCULAR; INTRAVENOUS at 00:50

## 2020-03-07 ASSESSMENT — PAIN DESCRIPTION - DESCRIPTORS: DESCRIPTORS: ACHING;DISCOMFORT

## 2020-03-07 ASSESSMENT — PAIN DESCRIPTION - FREQUENCY: FREQUENCY: CONTINUOUS

## 2020-03-07 ASSESSMENT — PAIN DESCRIPTION - LOCATION: LOCATION: ABDOMEN

## 2020-03-07 ASSESSMENT — PAIN DESCRIPTION - ONSET: ONSET: ON-GOING

## 2020-03-07 ASSESSMENT — PAIN SCALES - GENERAL
PAINLEVEL_OUTOF10: 0
PAINLEVEL_OUTOF10: 3
PAINLEVEL_OUTOF10: 0
PAINLEVEL_OUTOF10: 5

## 2020-03-07 ASSESSMENT — PAIN DESCRIPTION - PAIN TYPE: TYPE: ACUTE PAIN

## 2020-03-07 ASSESSMENT — PAIN DESCRIPTION - ORIENTATION: ORIENTATION: MID

## 2020-03-07 NOTE — PROGRESS NOTES
[facility]  Internal Medicine Residency / [address]      Attending Physician Statement  I have discussed the case, including pertinent history and exam findings with the resident and the team.  I have seen and examined the patient and the key elements of the encounter have been performed by me. I agree with the assessment, plan and orders as documented by the resident. Case Discussed During AM Rounds    No overnight events   Tolerating diet    However continued complaints now of generalized abdominal pain    + flatus and belching improves symptoms   No further diarrhea   Hx of cdiff   Reviewed GI recommendations and observe off atbs   No further dysuria   Repeat U/A planned      Ileitis- noted on CT imaging    Advance diet slowly- carb control    GI consultation appreciated    Stop atbs and observe   Repeat U/A    Symptomatic management for gaseous distention if persistent     Type II DM- uncontrolled    Continue insulin regimen   Adjust with change in diet for improved control    Need close outpatient follow-up    Hypotension- resolved     Disposition- follows with [facility] on [facility]- will need close outpatient follow-up upon DC. Remainder of medical problems as per resident note.       Deanna Hay  Internal Medicine Residency Faculty

## 2020-03-07 NOTE — CONSULTS
discomfort. She had a CRP of 1.5 and a sedimentation rate of  46, but has remained afebrile. Her creatinine on admission was 1.0,  rising to 1.2. It has been as low as 0.7 in the past.  Her urinalysis  showed a degree of proteinuria. There were more than 20 white blood  cells per high-powered field and urine cultures are growing more than 10  to the 5th gram-negative rods. Historically, she had her gallbladder removed about 20 years ago. She  seems to report periods of diarrhea, meal-related, without bleeding,  which respond with constipation after low doses of loperamide. There is  no history of rectal bleeding. The symptoms seemed chronic. Coming into the hospital, she was on ranitidine, ibuprofen, dicyclomine,  omeprazole and Neurontin as well as insulin. In-house, she is receiving  ciprofloxacin insulin, pantoprazole and IV fluids. PAST MEDICAL HISTORY:  Includes diabetes with some complications and a  cholecystectomy. OBJECTIVE:  GENERAL:  She is afebrile and comfortable. She ate her entire breakfast  tray. She is alert and oriented. She has no rash or adenopathy. NECK:  Veins are flat. LUNGS:  Fields are clear. HEART:  Tones are normal.  Regular rate. Regular rhythm. No audible  murmur. No gallop. ABDOMEN:  Soft abdomen. Nontender. No organomegaly. No ascites and no  dependent edema. ASSESSMENT:  I would agree that there is definitely an abnormal amount  of fluid in the abdomen. She has a history of amenorrhea for 12 years. No pelvic pathology was identified. There is no evidence of vascular  compromise, such as superior mesenteric vein thrombosis. The acute  onset of symptoms argues against inflammatory bowel disease as  Does the lab work. This is something that might seen in angioneurotic  edema, but there is no provocation drug here ie ACE inhibitor  No prior history of  similar events.   I do not agree that there is much indication of small  bowel inflammatory changes. PLAN:  She was on Cipro and metronidazole. I would withhold those if  the issue is her abdominal pain and diarrhea. Acknowledging urinary  tract infection. Cipro tends to be not a great drug for a urinary tract  infections given a high level of resistance. Bactrim might be a better  choice. I would like to go ahead with a CT enterography, but her  creatinine maria c from 1 to 1.2 in the setting of chronic diabetes and a  degree of proteinuria with IV contrast  Since my suspicions are low, will not proceed. Small bowel series unlikely to be helpful. Suspect ascites whatever  the cause, is an acute event and will resolve. Followup imaging without  contrast would be appropriate. PLAN:  I would say we observe for 24 hours. She seems to be improving. Stool for calprotectin seems reasonable. Continue diet. Reconsider  antibiotic choices.         Kavon Garcia MD    D: 03/07/2020 12:29:53       T: 03/07/2020 12:41:28     [email or web address]  Job#: [id number]     Doc#: [id number]    CC:

## 2020-03-07 NOTE — PROGRESS NOTES
[facility]  Internal Medicine Residency Program  Progress Note - House Team 2    Patient:  Kristina Hays 37 y.o. female MRN: [id number]     Date of Service: 3/7/2020     CC: N/V and diarrhea  Overnight events: none    Subjective     Pt is awake,alert, and examined at bedside, still complains of nausea, no vomiting or diarrhea. Also has blaoting and mild diffuse abd discomfort. Objective     Physical Exam:  · Vitals: BP (!) 99/56   Pulse 70   Temp 97.9 °F (36.6 °C) (Temporal)   Resp 16   Wt 200 lb (90.7 kg)   SpO2 96%   BMI 36.58 kg/m²     · I & O - 24hr: No intake/output data recorded. · General Appearance: alert, appears stated age and cooperative  · HEENT:  Head: Normal, normocephalic, atraumatic. · Neck: supple, symmetrical, trachea midline  · Lung: clear to auscultation bilaterally  · Heart: regular rate and rhythm, S1, S2 normal, no murmur, click, rub or gallop  · Abdomen: soft, non-tender; bowel sounds normal; no masses,  no organomegaly  · Extremities:  extremities normal, atraumatic, no cyanosis or edema  · Musculokeletal: No joint swelling, no muscle tenderness. ROM normal in all joints of extremities.  Has an healing blackish ulcer in the base of 2nd metatarsal  · Neurologic: Mental status: Alert, oriented, thought content appropriate  Subject  Pertinent Labs & Imaging Studies   winnie  CBC with Differential:    Lab Results   Component Value Date    WBC 6.3 03/07/2020    RBC 4.27 03/07/2020    HGB 11.1 03/07/2020    HCT 35.7 03/07/2020     03/07/2020    MCV 83.6 03/07/2020    MCH 26.0 03/07/2020    MCHC 31.1 03/07/2020    RDW 12.5 03/07/2020    LYMPHOPCT 37.0 03/07/2020    MONOPCT 5.9 03/07/2020    BASOPCT 0.8 03/07/2020    MONOSABS 0.37 03/07/2020    LYMPHSABS 2.34 03/07/2020    EOSABS 0.24 03/07/2020    BASOSABS 0.05 03/07/2020     BMP:    Lab Results   Component Value Date     03/06/2020    K 4.4 03/06/2020    K 4.7 03/05/2020     03/06/2020 CO2 23 03/06/2020    BUN 7 03/06/2020    LABALBU 3.3 03/05/2020    CREATININE 1.1 03/06/2020    CALCIUM 8.1 03/06/2020    GFRAA >60 03/06/2020    LABGLOM 56 03/06/2020    GLUCOSE 238 03/06/2020     Magnesium:    Lab Results   Component Value Date    MG 2.0 03/06/2020     Phosphorus:  No results found for: PHOS    Resident's Assessment and Plan     Pt is a 37year old female with PMH of IDDM type I, polyneuropathy and GERD came in with cc of N/V/D    Uncomplicated UTI  C/o burning micturition  UA +  On cipro 400 Q12  Will hold off abx today and given her hx of c diff  Will repeat UA today  Ucx + for GNR, will await sensitivity and org    ileitis  2/2 inflammatory vs infec vs chronic PPI  No diarrhea today  On cipro and flagyl  Stool cx and leukocytes pending  GI wants an stool calprotectin, may need CT enterography  Once her creatinine is better  No need for abx   Complains of abd discomfort and bloating  Will watch for today and reassess    Hypotension  On admission  From diarrhea vs poor oral intake  Received 4 L of IVF  Cortisol WNL  orthostats neg    IDDM  Dx at age 19  Takes lantus 20-30 at home  Hypoglycemic episodes on 40+ lantus  On diabetic diet  A1c 10.1  Uncontrolled  Increased to 20 t, yesterday  Will follow BS   Pending  labs to evaluate DM type I    CATIA  2/2 contrast  Will give one litre bolus  Will obtain BMP tomorrow      GERD  On omeprazole at home  PPI    Polyneuropathy  Gabapentin 600 BID    Core measures:     Code status:full  PT/OT evaluation:not needed  DVT prophylaxis:lovenox  GI prophylaxis: PPI  Disposition: home    Shai Garcia M.D.   Internal Medicine Resident - PGY 1    Attending physician: Dr. Lm Putnam

## 2020-03-08 LAB
ANION GAP SERPL CALCULATED.3IONS-SCNC: 11 MMOL/L (ref 7–16)
BASOPHILS ABSOLUTE: 0.07 E9/L (ref 0–0.2)
BASOPHILS RELATIVE PERCENT: 1.1 % (ref 0–2)
BUN BLDV-MCNC: 10 MG/DL (ref 6–20)
CALCIUM SERPL-MCNC: 8.7 MG/DL (ref 8.6–10.2)
CHLORIDE BLD-SCNC: 105 MMOL/L (ref 98–107)
CO2: 23 MMOL/L (ref 22–29)
CREAT SERPL-MCNC: 1 MG/DL (ref 0.5–1)
EOSINOPHILS ABSOLUTE: 0.3 E9/L (ref 0.05–0.5)
EOSINOPHILS RELATIVE PERCENT: 4.8 % (ref 0–6)
GFR AFRICAN AMERICAN: >60
GFR NON-AFRICAN AMERICAN: >60 ML/MIN/1.73
GLUCOSE BLD-MCNC: 198 MG/DL (ref 74–99)
HCT VFR BLD CALC: 35 % (ref 34–48)
HEMOGLOBIN: 11 G/DL (ref 11.5–15.5)
IMMATURE GRANULOCYTES #: 0.03 E9/L
IMMATURE GRANULOCYTES %: 0.5 % (ref 0–5)
LYMPHOCYTES ABSOLUTE: 2.65 E9/L (ref 1.5–4)
LYMPHOCYTES RELATIVE PERCENT: 42.2 % (ref 20–42)
MCH RBC QN AUTO: 25.6 PG (ref 26–35)
MCHC RBC AUTO-ENTMCNC: 31.4 % (ref 32–34.5)
MCV RBC AUTO: 81.4 FL (ref 80–99.9)
METER GLUCOSE: 124 MG/DL (ref 74–99)
METER GLUCOSE: 162 MG/DL (ref 74–99)
METER GLUCOSE: 172 MG/DL (ref 74–99)
METER GLUCOSE: 188 MG/DL (ref 74–99)
METER GLUCOSE: 209 MG/DL (ref 74–99)
MONOCYTES ABSOLUTE: 0.36 E9/L (ref 0.1–0.95)
MONOCYTES RELATIVE PERCENT: 5.7 % (ref 2–12)
NEUTROPHILS ABSOLUTE: 2.87 E9/L (ref 1.8–7.3)
NEUTROPHILS RELATIVE PERCENT: 45.7 % (ref 43–80)
ORGANISM: ABNORMAL
PDW BLD-RTO: 12.3 FL (ref 11.5–15)
PLATELET # BLD: 240 E9/L (ref 130–450)
PMV BLD AUTO: 11.7 FL (ref 7–12)
POTASSIUM SERPL-SCNC: 3.8 MMOL/L (ref 3.5–5)
RBC # BLD: 4.3 E12/L (ref 3.5–5.5)
SODIUM BLD-SCNC: 139 MMOL/L (ref 132–146)
URINE CULTURE, ROUTINE: ABNORMAL
URINE CULTURE, ROUTINE: ABNORMAL
WBC # BLD: 6.3 E9/L (ref 4.5–11.5)

## 2020-03-08 PROCEDURE — 85025 COMPLETE CBC W/AUTO DIFF WBC: CPT

## 2020-03-08 PROCEDURE — C9113 INJ PANTOPRAZOLE SODIUM, VIA: HCPCS | Performed by: INTERNAL MEDICINE

## 2020-03-08 PROCEDURE — 36415 COLL VENOUS BLD VENIPUNCTURE: CPT

## 2020-03-08 PROCEDURE — 6370000000 HC RX 637 (ALT 250 FOR IP): Performed by: INTERNAL MEDICINE

## 2020-03-08 PROCEDURE — 99231 SBSQ HOSP IP/OBS SF/LOW 25: CPT | Performed by: INTERNAL MEDICINE

## 2020-03-08 PROCEDURE — 2580000003 HC RX 258: Performed by: INTERNAL MEDICINE

## 2020-03-08 PROCEDURE — 6360000002 HC RX W HCPCS: Performed by: INTERNAL MEDICINE

## 2020-03-08 PROCEDURE — 80048 BASIC METABOLIC PNL TOTAL CA: CPT

## 2020-03-08 PROCEDURE — 1200000000 HC SEMI PRIVATE

## 2020-03-08 PROCEDURE — 82962 GLUCOSE BLOOD TEST: CPT

## 2020-03-08 RX ADMIN — ACETAMINOPHEN 650 MG: 325 TABLET ORAL at 15:21

## 2020-03-08 RX ADMIN — GABAPENTIN 600 MG: 600 TABLET, FILM COATED ORAL at 17:22

## 2020-03-08 RX ADMIN — GABAPENTIN 600 MG: 600 TABLET, FILM COATED ORAL at 09:01

## 2020-03-08 RX ADMIN — INSULIN LISPRO 2 UNITS: 100 INJECTION, SOLUTION INTRAVENOUS; SUBCUTANEOUS at 17:23

## 2020-03-08 RX ADMIN — INSULIN LISPRO 1 UNITS: 100 INJECTION, SOLUTION INTRAVENOUS; SUBCUTANEOUS at 20:11

## 2020-03-08 RX ADMIN — INSULIN GLARGINE 20 UNITS: 100 INJECTION, SOLUTION SUBCUTANEOUS at 20:11

## 2020-03-08 RX ADMIN — INSULIN LISPRO 2 UNITS: 100 INJECTION, SOLUTION INTRAVENOUS; SUBCUTANEOUS at 09:02

## 2020-03-08 RX ADMIN — SODIUM CHLORIDE, PRESERVATIVE FREE 10 ML: 5 INJECTION INTRAVENOUS at 09:01

## 2020-03-08 RX ADMIN — SODIUM CHLORIDE, PRESERVATIVE FREE 10 ML: 5 INJECTION INTRAVENOUS at 20:11

## 2020-03-08 RX ADMIN — POLYETHYLENE GLYCOL 3350 17 G: 17 POWDER, FOR SOLUTION ORAL at 09:01

## 2020-03-08 RX ADMIN — PANTOPRAZOLE SODIUM 40 MG: 40 INJECTION, POWDER, FOR SOLUTION INTRAVENOUS at 09:01

## 2020-03-08 RX ADMIN — ENOXAPARIN SODIUM 40 MG: 40 INJECTION, SOLUTION INTRAVENOUS; SUBCUTANEOUS at 10:04

## 2020-03-08 ASSESSMENT — PAIN DESCRIPTION - FREQUENCY: FREQUENCY: CONTINUOUS

## 2020-03-08 ASSESSMENT — PAIN DESCRIPTION - ONSET: ONSET: PROGRESSIVE

## 2020-03-08 ASSESSMENT — PAIN DESCRIPTION - PAIN TYPE: TYPE: ACUTE PAIN

## 2020-03-08 ASSESSMENT — PAIN DESCRIPTION - ORIENTATION: ORIENTATION: LEFT

## 2020-03-08 ASSESSMENT — PAIN SCALES - GENERAL
PAINLEVEL_OUTOF10: 5
PAINLEVEL_OUTOF10: 0
PAINLEVEL_OUTOF10: 0
PAINLEVEL_OUTOF10: 6

## 2020-03-08 ASSESSMENT — PAIN DESCRIPTION - LOCATION: LOCATION: ABDOMEN

## 2020-03-08 ASSESSMENT — PAIN DESCRIPTION - DESCRIPTORS: DESCRIPTORS: ACHING;CONSTANT;DISCOMFORT

## 2020-03-08 NOTE — PROGRESS NOTES
03/05/2020     03/07/2020    CO2 23 03/07/2020    BUN 7 03/07/2020    LABALBU 3.3 03/05/2020    CREATININE 1.2 03/07/2020    CALCIUM 8.3 03/07/2020    GFRAA >60 03/07/2020    LABGLOM 51 03/07/2020    GLUCOSE 232 03/07/2020     Magnesium:    Lab Results   Component Value Date    MG 2.0 03/06/2020     Phosphorus:  No results found for: PHOS    Resident's Assessment and Plan     Pt is a 37year old female with PMH of IDDM type I, polyneuropathy and GERD came in with cc of N/V/D    Uncomplicated UTI  C/o burning micturition  UA +  On cipro 400 Q12  Will hold off abx today and given her hx of c diff   repeat UA neg  Ucx + for GNR, citroabacter    ileitis  2/2 inflammatory vs infec vs chronic PPI  No diarrhea today  On cipro and flagyl  Stool cx and leukocytes pending  GI wants an stool calprotectin, may need CT enterography  Once her creatinine is better  No need for abx   Complains of abd discomfort and bloating  Will watch for today and reassess  Will follow GI recs    Hypotension  On admission  From diarrhea vs poor oral intake  Received 4 L of IVF  Cortisol WNL  orthostats neg    IDDM  Dx at age 19  Takes lantus 20-30 at home  Hypoglycemic episodes on 40+ lantus  On diabetic diet  A1c 10.1  Uncontrolled  On 20 lantus and LDSS  Will follow BS   Pending  labs to evaluate DM type I    CATIA  2/2 contrast  Will give one litre bolus  resolved      GERD  On omeprazole at home  PPI    Polyneuropathy  Gabapentin 600 BID    Core measures:     Code status:full  PT/OT evaluation:not needed  DVT prophylaxis:lovenox  GI prophylaxis: PPI  Disposition: home    Vandana Benjamin M.D.   Internal Medicine Resident - PGY 1    Attending physician: Dr. Tim Hays

## 2020-03-09 ENCOUNTER — APPOINTMENT (OUTPATIENT)
Dept: GENERAL RADIOLOGY | Age: 35
DRG: 249 | End: 2020-03-09
Payer: COMMERCIAL

## 2020-03-09 LAB
ANION GAP SERPL CALCULATED.3IONS-SCNC: 10 MMOL/L (ref 7–16)
BASOPHILS ABSOLUTE: 0.05 E9/L (ref 0–0.2)
BASOPHILS RELATIVE PERCENT: 0.8 % (ref 0–2)
BUN BLDV-MCNC: 14 MG/DL (ref 6–20)
CALCIUM SERPL-MCNC: 9.2 MG/DL (ref 8.6–10.2)
CHLORIDE BLD-SCNC: 105 MMOL/L (ref 98–107)
CO2: 25 MMOL/L (ref 22–29)
CORTISOL TOTAL: 1.01 MCG/DL (ref 2.68–18.4)
CORTISOL TOTAL: 1.29 MCG/DL (ref 2.68–18.4)
CORTISOL TOTAL: 1.34 MCG/DL (ref 2.68–18.4)
CORTISOL TOTAL: 11.28 MCG/DL (ref 2.68–18.4)
CORTISOL TOTAL: 7.26 MCG/DL (ref 2.68–18.4)
CREAT SERPL-MCNC: 1 MG/DL (ref 0.5–1)
EOSINOPHILS ABSOLUTE: 0.27 E9/L (ref 0.05–0.5)
EOSINOPHILS RELATIVE PERCENT: 4.2 % (ref 0–6)
GFR AFRICAN AMERICAN: >60
GFR NON-AFRICAN AMERICAN: >60 ML/MIN/1.73
GLUCOSE BLD-MCNC: 141 MG/DL (ref 74–99)
GLUTAMIC ACID DECARB AB: <5 IU/ML (ref 0–5)
HCT VFR BLD CALC: 33.9 % (ref 34–48)
HEMOGLOBIN: 10.7 G/DL (ref 11.5–15.5)
IMMATURE GRANULOCYTES #: 0.02 E9/L
IMMATURE GRANULOCYTES %: 0.3 % (ref 0–5)
LYMPHOCYTES ABSOLUTE: 2.72 E9/L (ref 1.5–4)
LYMPHOCYTES RELATIVE PERCENT: 42.5 % (ref 20–42)
MCH RBC QN AUTO: 25.7 PG (ref 26–35)
MCHC RBC AUTO-ENTMCNC: 31.6 % (ref 32–34.5)
MCV RBC AUTO: 81.5 FL (ref 80–99.9)
METER GLUCOSE: 107 MG/DL (ref 74–99)
METER GLUCOSE: 153 MG/DL (ref 74–99)
METER GLUCOSE: 216 MG/DL (ref 74–99)
METER GLUCOSE: 227 MG/DL (ref 74–99)
MONOCYTES ABSOLUTE: 0.39 E9/L (ref 0.1–0.95)
MONOCYTES RELATIVE PERCENT: 6.1 % (ref 2–12)
NEUTROPHILS ABSOLUTE: 2.95 E9/L (ref 1.8–7.3)
NEUTROPHILS RELATIVE PERCENT: 46.1 % (ref 43–80)
PDW BLD-RTO: 12.3 FL (ref 11.5–15)
PLATELET # BLD: 236 E9/L (ref 130–450)
PMV BLD AUTO: 11.7 FL (ref 7–12)
POTASSIUM SERPL-SCNC: 4.1 MMOL/L (ref 3.5–5)
RBC # BLD: 4.16 E12/L (ref 3.5–5.5)
SODIUM BLD-SCNC: 140 MMOL/L (ref 132–146)
WBC # BLD: 6.4 E9/L (ref 4.5–11.5)

## 2020-03-09 PROCEDURE — 6370000000 HC RX 637 (ALT 250 FOR IP): Performed by: INTERNAL MEDICINE

## 2020-03-09 PROCEDURE — 36415 COLL VENOUS BLD VENIPUNCTURE: CPT

## 2020-03-09 PROCEDURE — 85025 COMPLETE CBC W/AUTO DIFF WBC: CPT

## 2020-03-09 PROCEDURE — 83993 ASSAY FOR CALPROTECTIN FECAL: CPT

## 2020-03-09 PROCEDURE — 80048 BASIC METABOLIC PNL TOTAL CA: CPT

## 2020-03-09 PROCEDURE — 97535 SELF CARE MNGMENT TRAINING: CPT

## 2020-03-09 PROCEDURE — 89055 LEUKOCYTE ASSESSMENT FECAL: CPT

## 2020-03-09 PROCEDURE — 99232 SBSQ HOSP IP/OBS MODERATE 35: CPT | Performed by: INTERNAL MEDICINE

## 2020-03-09 PROCEDURE — 6360000002 HC RX W HCPCS: Performed by: INTERNAL MEDICINE

## 2020-03-09 PROCEDURE — 97161 PT EVAL LOW COMPLEX 20 MIN: CPT

## 2020-03-09 PROCEDURE — 74018 RADEX ABDOMEN 1 VIEW: CPT

## 2020-03-09 PROCEDURE — 1200000000 HC SEMI PRIVATE

## 2020-03-09 PROCEDURE — 82962 GLUCOSE BLOOD TEST: CPT

## 2020-03-09 PROCEDURE — C9113 INJ PANTOPRAZOLE SODIUM, VIA: HCPCS | Performed by: INTERNAL MEDICINE

## 2020-03-09 PROCEDURE — 87045 FECES CULTURE AEROBIC BACT: CPT

## 2020-03-09 PROCEDURE — 97165 OT EVAL LOW COMPLEX 30 MIN: CPT

## 2020-03-09 PROCEDURE — 97530 THERAPEUTIC ACTIVITIES: CPT

## 2020-03-09 PROCEDURE — 2580000003 HC RX 258: Performed by: INTERNAL MEDICINE

## 2020-03-09 PROCEDURE — 82533 TOTAL CORTISOL: CPT

## 2020-03-09 RX ORDER — HYDROCORTISONE 5 MG/1
10 TABLET ORAL DAILY
Status: DISCONTINUED | OUTPATIENT
Start: 2020-03-10 | End: 2020-03-10 | Stop reason: HOSPADM

## 2020-03-09 RX ORDER — INSULIN GLARGINE 100 [IU]/ML
22 INJECTION, SOLUTION SUBCUTANEOUS NIGHTLY
Status: DISCONTINUED | OUTPATIENT
Start: 2020-03-09 | End: 2020-03-10 | Stop reason: HOSPADM

## 2020-03-09 RX ORDER — COSYNTROPIN 0.25 MG/ML
250 INJECTION, POWDER, FOR SOLUTION INTRAMUSCULAR; INTRAVENOUS ONCE
Status: COMPLETED | OUTPATIENT
Start: 2020-03-09 | End: 2020-03-09

## 2020-03-09 RX ORDER — DOCUSATE SODIUM 100 MG/1
100 CAPSULE, LIQUID FILLED ORAL 2 TIMES DAILY PRN
Status: DISCONTINUED | OUTPATIENT
Start: 2020-03-09 | End: 2020-03-10 | Stop reason: HOSPADM

## 2020-03-09 RX ORDER — HYDROCORTISONE 5 MG/1
5 TABLET ORAL DAILY
Status: DISCONTINUED | OUTPATIENT
Start: 2020-03-09 | End: 2020-03-10 | Stop reason: HOSPADM

## 2020-03-09 RX ADMIN — ENOXAPARIN SODIUM 40 MG: 40 INJECTION, SOLUTION INTRAVENOUS; SUBCUTANEOUS at 08:23

## 2020-03-09 RX ADMIN — SODIUM CHLORIDE, PRESERVATIVE FREE 10 ML: 5 INJECTION INTRAVENOUS at 08:24

## 2020-03-09 RX ADMIN — INSULIN GLARGINE 22 UNITS: 100 INJECTION, SOLUTION SUBCUTANEOUS at 21:29

## 2020-03-09 RX ADMIN — INSULIN LISPRO 4 UNITS: 100 INJECTION, SOLUTION INTRAVENOUS; SUBCUTANEOUS at 17:47

## 2020-03-09 RX ADMIN — PANTOPRAZOLE SODIUM 40 MG: 40 INJECTION, POWDER, FOR SOLUTION INTRAVENOUS at 08:23

## 2020-03-09 RX ADMIN — GABAPENTIN 600 MG: 600 TABLET, FILM COATED ORAL at 08:23

## 2020-03-09 RX ADMIN — COSYNTROPIN 250 MCG: 0.25 INJECTION, POWDER, LYOPHILIZED, FOR SOLUTION INTRAMUSCULAR; INTRAVENOUS at 14:08

## 2020-03-09 RX ADMIN — SODIUM CHLORIDE, PRESERVATIVE FREE 10 ML: 5 INJECTION INTRAVENOUS at 08:23

## 2020-03-09 RX ADMIN — INSULIN LISPRO 3 UNITS: 100 INJECTION, SOLUTION INTRAVENOUS; SUBCUTANEOUS at 21:29

## 2020-03-09 RX ADMIN — SODIUM CHLORIDE, PRESERVATIVE FREE 10 ML: 5 INJECTION INTRAVENOUS at 21:32

## 2020-03-09 RX ADMIN — HYDROCORTISONE 5 MG: 5 TABLET ORAL at 17:45

## 2020-03-09 RX ADMIN — INSULIN LISPRO 2 UNITS: 100 INJECTION, SOLUTION INTRAVENOUS; SUBCUTANEOUS at 08:24

## 2020-03-09 RX ADMIN — GABAPENTIN 600 MG: 600 TABLET, FILM COATED ORAL at 17:45

## 2020-03-09 ASSESSMENT — PAIN SCALES - GENERAL
PAINLEVEL_OUTOF10: 0

## 2020-03-09 NOTE — PROGRESS NOTES
[facility]  Internal Medicine Residency Program  Progress Note - House Team 1    Patient:  Tricia Ramsey 37 y.o. female MRN: [id number]     Date of Service: 3/9/2020     CC: N/V/D  Overnight events: None     Subjective     Patient was seen and examined this morning at bedside in no acute distress. Overnight, no acute issues. Today, patient reports not having bowel movement for 5 days, passing flatus. This afternoon, patient had small, soft BM. Denies any diarrhea, abdominal pain, nausea or vomiting. GI note reviewed, CT enterography will be deferred. Cosyntropin stimulation test ordered, but not performed correctly this morning, was done this afternoon. XR abdomen ordered, normal gas distribution, no air-fluid levels noted. No other acute complaints at this time. Objective     Physical Exam:  · Vitals: BP (!) 104/55   Pulse 66   Temp 97.9 °F (36.6 °C) (Oral)   Resp 16   Wt 200 lb (90.7 kg)   SpO2 96%   BMI 36.58 kg/m²     · I & O - 24hr: No intake/output data recorded. · General Appearance: alert, appears stated age and cooperative  · HEENT:  Head: Normocephalic, no lesions, without obvious abnormality. · Neck: no adenopathy, no carotid bruit, no JVD, supple, symmetrical, trachea midline and thyroid not enlarged, symmetric, no tenderness/mass/nodules  · Lung: clear to auscultation bilaterally  · Heart: regular rate and rhythm, S1, S2 normal, no murmur, click, rub or gallop  · Abdomen: soft, non-tender; bowel sounds normal; no masses,  no organomegaly  · Extremities:  extremities normal, atraumatic, no cyanosis or edema  · Musculokeletal: No joint swelling, no muscle tenderness. ROM normal in all joints of extremities.    · Neurologic: Mental status: Alert, oriented, thought content appropriate  Subject  Pertinent Labs & Imaging Studies   winnie  CBC with Differential:    Lab Results   Component Value Date    WBC 6.4 03/09/2020    RBC 4.16 03/09/2020    HGB 10.7 03/09/2020    HCT 33.9 03/09/2020     03/09/2020    MCV 81.5 03/09/2020    MCH 25.7 03/09/2020    MCHC 31.6 03/09/2020    RDW 12.3 03/09/2020    LYMPHOPCT 42.5 03/09/2020    MONOPCT 6.1 03/09/2020    BASOPCT 0.8 03/09/2020    MONOSABS 0.39 03/09/2020    LYMPHSABS 2.72 03/09/2020    EOSABS 0.27 03/09/2020    BASOSABS 0.05 03/09/2020     BMP:    Lab Results   Component Value Date     03/09/2020    K 4.1 03/09/2020    K 4.7 03/05/2020     03/09/2020    CO2 25 03/09/2020    BUN 14 03/09/2020    LABALBU 3.3 03/05/2020    CREATININE 1.0 03/09/2020    CALCIUM 9.2 03/09/2020    GFRAA >60 03/09/2020    LABGLOM >60 03/09/2020    GLUCOSE 141 03/09/2020       Resident's Assessment and Plan     Ileitis   - off Abx  - symptomatic resolution  - GI consulted, holding off on further imaging  - stool studies ordered, will f/u fecal leukocytes, culture, calprotectin    Hypotension  - Cosyntropin stim test ordered, f/u cortisol levels r/o adrenal insufficiency  - at baseline since admission    IDDM  - f/u DM1 Abs  - continue Lantus 20 qhs + sliding scale   - BG checks q6h    GERD  - continue Protonix        PT/OT evaluation: ordered  DVT prophylaxis/ GI prophylaxis: Lovenox/Protonix   Disposition: continue current care    Michelle Lyman MD, PGY-1  Attending physician: Dr. Desirae Vigil

## 2020-03-09 NOTE — PROGRESS NOTES
Progress Note    Date:3/9/2020       IUOD:3007/1150-Q  Patient Name:Meena Ellison     YOB: 1985     Age:34 y.o. Subjective   Interval History Status: not changed. Tammy Winter is sitting upright in bed talking to her  on the phone this morning. She states that she still is feeling very bloated and gassy. She also has not had a bowel movement since before admission (5 days). She is now also complaining on lower back pain, which she is attributing to constipation. Review of Systems   Respiratory ROS: no cough, shortness of breath, or wheezing  Cardiovascular ROS: no chest pain or dyspnea on exertion  Gastrointestinal ROS: positive for - gas/bloating constipation  negative for - diarrhea or nausea/vomiting    Medications   Scheduled Meds:    insulin lispro  0-12 Units Subcutaneous TID WC    insulin lispro  0-6 Units Subcutaneous Nightly    insulin glargine  20 Units Subcutaneous Nightly    pantoprazole  40 mg Intravenous Daily    And    sodium chloride (PF)  10 mL Intravenous Daily    sodium chloride flush  10 mL Intravenous 2 times per day    enoxaparin  40 mg Subcutaneous Daily    [Held by provider] ciprofloxacin  400 mg Intravenous Q12H    gabapentin  600 mg Oral BID WC     Continuous Infusions:    dextrose       PRN Meds: sodium chloride flush, glucose, dextrose, glucagon (rDNA), dextrose, acetaminophen **OR** acetaminophen, polyethylene glycol, ondansetron **OR** ondansetron    Past History    Past Medical History:   has a past medical history of Diabetes mellitus ([address] Social History:   reports that she is a non-smoker but has been exposed to tobacco smoke. She has never used smokeless tobacco. She reports that she does not drink alcohol or use drugs. Family History: History reviewed. No pertinent family history.     Physical Examination      Vitals:  /71   Pulse 67   Temp 97.5 °F (36.4 °C) (Temporal)   Resp 16   Wt 200 lb (90.7 kg)   SpO2 96%

## 2020-03-09 NOTE — CARE COORDINATION
Patient remains here for persistent abdominal pain and she is still bloated. KUB for today is pending. Await results. PT/OT evals have been ordered to assist with transition of care determination. If plan is home, her  or Brother-in-law will transport her home at time of discharge.   Mitra Cooper RN CM

## 2020-03-09 NOTE — PLAN OF CARE
Problem: Cardiac Output - Decreased:  Goal: Avoidance of environmental tobacco smoke  Description: Absence of orthostatic hypotension  3/9/2020 1002 by Emory Renee RN  Outcome: Met This Shift  3/9/2020 0119 by Rand Suh RN  Outcome: Met This Shift  Goal: Cardiac output within specified parameters  Description: Cardiac output within specified parameters  3/9/2020 1002 by Emory Renee RN  Outcome: Met This Shift  3/9/2020 0119 by Rand Suh RN  Outcome: Met This Shift  Goal: Hemodynamic stability will improve  Description: Hemodynamic stability will improve  3/9/2020 1002 by Emory Renee RN  Outcome: Met This Shift  3/9/2020 0119 by Rand Suh RN  Outcome: Met This Shift     Problem: Fluid Volume - Imbalance:  Goal: Absence of imbalanced fluid volume signs and symptoms  Description: Absence of imbalanced fluid volume signs and symptoms  3/9/2020 1002 by Emory Renee RN  Outcome: Met This Shift  3/9/2020 0119 by Rand Suh RN  Outcome: Met This Shift     Problem: Tissue Perfusion, Altered:  Goal: Circulatory function within specified parameters  Description: Circulatory function within specified parameters  3/9/2020 1002 by Emory Renee RN  Outcome: Met This Shift  3/9/2020 0119 by Rand Suh RN  Outcome: Met This Shift     Problem: Nausea/Vomiting:  Goal: Absence of nausea/vomiting  Description: Absence of nausea/vomiting  3/9/2020 1002 by Emory Renee RN  Outcome: Met This Shift  3/9/2020 0119 by Rand Suh RN  Outcome: Met This Shift  Goal: Able to drink  Description: Able to drink  3/9/2020 1002 by Emory Renee RN  Outcome: Met This Shift  3/9/2020 0119 by Rand Suh RN  Outcome: Met This Shift  Goal: Able to eat  Description: Able to eat  3/9/2020 1002 by Emory Renee RN  Outcome: Met This Shift  3/9/2020 0119 by Rand Suh RN  Outcome: Met This Shift  Goal: Ability to achieve adequate nutritional intake will improve  Description: Ability to achieve adequate nutritional intake

## 2020-03-09 NOTE — PROGRESS NOTES
OCCUPATIONAL THERAPY INITIAL EVALUATION      Date:3/9/2020  Patient Name: Bettina Kellogg  MRN: [id number]  : 1985  Room: [address] [place] [address]    Referring Provider: Scot Yadav MD      Evaluating OT: Mayi Hendrix OTR/L [id number]    AM-PAC Daily Activity Raw Score:     Recommended Adaptive Equipment: LB dressing AE       Diagnosis: Hypotension    Reason for admission: Pt presented to ER with c/o abdominal pain, nausea, vomiting and diarrhea; weakness      Pertinent Medical History: DM, poly neuropathy, B drop foot    Precautions:  Falls, B foot drop     Home Living: Pt lives with  & daughter  in a 2 story home with 1 step(s) to enter and no rail(s); bed/bath on 2nd floor: flight with rail. Pt reports she stays on first floor on sofa & uses BSC when  is not home. Bathroom setup: tub/shower with seat and rail; standard commode  Equipment owned: BSC, shower seat, issued LB dressing AE    Prior Level of Function: occasional Min A with ADLs (washing hair and LB dressing); Assist as needed with IADLs. No device for ambulation. Driving: yes- short distances     Pain Level: pt c/o new onset back pain; 4/10. Pt c/o chronic L UE/shoulder pain with ext rotation. Cognition: A&O: 4/4    Follows 1-2 step commands appropriately.    Memory: G   Comprehension G   Problem solving: G   Judgement/safety: G               Communication skills: WFL           Vision: WFL               Glasses:yes                                                   Hearing: WFL     UE Assessment:  Hand Dominance: Right [x]  Left []     ROM Strength STM goal: PRN   RUE  WFL; grossly 90 shoulder with decreased ext rotation 4-/5                     4/5     LUE WFL; grossly 90 with decreased ext rotation  4-/5                     4/5       Sensation: c/o numbness b hands; h/o CTS  Tone: WNL   Edema: [facility]     Functional Assessment   Initial Eval Status  Date: 3/9 Treatment Status  Date: STG=LTG  5-7  days   Feeding Mod I                          Mod I   while seated up in chair to increase activity tolerance        Grooming S; set up                        Mod I   while standing sink level requiring no device for balance and demonstrating G tolerance      UB dressing/bathing Min A                        Mod I        LB dressing/bathing Min A; pt educated on use of LB dressing AE to assist with back pain                        Mod I   using AE as needed for safe reach/ energy conservation       Toileting NT                        Mod I     Bed Mobility  Supine to sit: [place]    Sit to supine: Quincy                        Mod I  in prep of ADL tasks & transfers   Functional Transfers Sit to stand: SBA    Stand to sit: SBA    Commode: SBA using rail                        Mod I  sit<>stand/functional bathroom transfers using AD/DME as needed for balance and safety   Functional Mobility SBA no device                         Mod I   functional/bathroom mobility using AD as needed & demonstrating G safety     Balance Sitting:     Static:  SBA    Dynamic:SBA  Standing: SBA                 [place] dynamic sitting balance and [place] dynamic standing balance during ADL tasks & transfers   Endurance/Activity Tolerance   F tolerance with moderate activity     G   tolerance with moderate activity/self care routine   Visual/  Perceptual   WFL                         Treatment: OT services provided include:  Instruction on precautions prior to bed mobility to facilitate safe transfers and ADLS; sitting/standing balance retraining ex's to improve righting reactions with postural changes during ADLS; adapted techniques/AE for energy conservation and pain management during dressing/bathing activities; functional transfer training including postural cues, hand placement/sequencing & bathroom safety  to assist with balance and fall prevention; breathing techniques, pacing, work simplification strategies & recommended bathroom DME reviewed for safety and energy conservation during self care tasks and activities of daily living. Pt provided with ADL AE kit for LB dressing/bathing. Comments: OK from RN to see patient. Upon arrival, patient sitting up in bed; agreeable to session. Pt demo demo F tolerance with activity and G understanding of education/techniques. At end of session, patient returned to bed per pt preference. Call light within reach, all lines and tubes intact. Pt instructed on use of call light for assistance and fall prevention. Evaluation Complexity: low    · History: Expanded chart review of consults, imaging, and psychosocial history related to current functional performance. · Exam: 3+ performance deficits identified limiting functional independence and safe return home   · Assistance/Modification: Min/mod assistance or modifications required to perform tasks. May have comorbidities that affect occupational performance.     Assessment of current deficits:   Functional mobility [x]  ADLs [x] Strength [x]  Cognition []  Functional transfers  [x] IADLs [x] Safety Awareness [x]  Endurance [x]  Fine Motor Coordination [x] Balance [x] Vision/perception [] Sensation []   Gross Motor Coordination [] ROM [x] Delirium []                  Communication []    Plan of Care: 1-5 tx/wk PRN   ADL retraining [x]   Equipment needs [x]   Neuromuscular re-education [] Energy Conservation Techniques [x]  Functional Transfer training [x] Patient and/or Family Education [x]  Functional Mobility training [x]  Environmental Modifications [x]  Cognitive re-training []   Compensatory techniques for ADLs [x]  Splinting Needs []   Positioning to improve overall function [x]   Therapeutic Activity [x]                       Therapeutic Exercise  [x]  Visual/Perceptual: [x]    Delirium prevention/treatment  [x]   Other:  []    Rehab Potential: goof for established goals    Patient / Family Goal: to go home with family assist    Patient and/or family were instructed/educated on diagnosis, prognosis/goals and plan of care. Pt demonstrated G understanding. [] Malnutrition indicators have been identified and nursing has been notified to ensure a dietitian consult is ordered. Low Evaluation +   Treatment Time In:1120              Treatment Time Out: 1135                 Treatment Charges: Mins Units   Ther Ex  68096     Manual Therapy 86214     Thera Activities 20293     ADL/Home Mgt 36826 15 1   Neuro Re-ed 28158     Orthotic manage/training  71023     Non-Billable Time     Total Timed Treatment 15 1            Evaluation time includes thorough review of current medical information, gathering information on past medical history/social history and prior level of function, completion of standardized testing/informal observation of tasks, assessment of data and development of POC/Goals.      Monica Arias, OTR/L [id number]

## 2020-03-10 VITALS
OXYGEN SATURATION: 97 % | WEIGHT: 200 LBS | DIASTOLIC BLOOD PRESSURE: 55 MMHG | BODY MASS INDEX: 36.58 KG/M2 | SYSTOLIC BLOOD PRESSURE: 109 MMHG | TEMPERATURE: 97.7 F | HEART RATE: 67 BPM | RESPIRATION RATE: 16 BRPM

## 2020-03-10 LAB
ANION GAP SERPL CALCULATED.3IONS-SCNC: 11 MMOL/L (ref 7–16)
BASOPHILS ABSOLUTE: 0.07 E9/L (ref 0–0.2)
BASOPHILS RELATIVE PERCENT: 1 % (ref 0–2)
BUN BLDV-MCNC: 16 MG/DL (ref 6–20)
CALCIUM SERPL-MCNC: 9.2 MG/DL (ref 8.6–10.2)
CHLORIDE BLD-SCNC: 102 MMOL/L (ref 98–107)
CO2: 27 MMOL/L (ref 22–29)
CREAT SERPL-MCNC: 0.9 MG/DL (ref 0.5–1)
EOSINOPHILS ABSOLUTE: 0.2 E9/L (ref 0.05–0.5)
EOSINOPHILS RELATIVE PERCENT: 3 % (ref 0–6)
GFR AFRICAN AMERICAN: >60
GFR NON-AFRICAN AMERICAN: >60 ML/MIN/1.73
GLUCOSE BLD-MCNC: 295 MG/DL (ref 74–99)
HCT VFR BLD CALC: 38.6 % (ref 34–48)
HEMOGLOBIN: 12.2 G/DL (ref 11.5–15.5)
IMMATURE GRANULOCYTES #: 0.03 E9/L
IMMATURE GRANULOCYTES %: 0.4 % (ref 0–5)
INSULIN A: 29.4 U/ML (ref 0–0.4)
LYMPHOCYTES ABSOLUTE: 2.29 E9/L (ref 1.5–4)
LYMPHOCYTES RELATIVE PERCENT: 34.1 % (ref 20–42)
Lab: NORMAL
MCH RBC QN AUTO: 25.5 PG (ref 26–35)
MCHC RBC AUTO-ENTMCNC: 31.6 % (ref 32–34.5)
MCV RBC AUTO: 80.6 FL (ref 80–99.9)
METER GLUCOSE: 205 MG/DL (ref 74–99)
METER GLUCOSE: 258 MG/DL (ref 74–99)
METER GLUCOSE: 266 MG/DL (ref 74–99)
MONOCYTES ABSOLUTE: 0.32 E9/L (ref 0.1–0.95)
MONOCYTES RELATIVE PERCENT: 4.8 % (ref 2–12)
NEUTROPHILS ABSOLUTE: 3.8 E9/L (ref 1.8–7.3)
NEUTROPHILS RELATIVE PERCENT: 56.7 % (ref 43–80)
PDW BLD-RTO: 12.5 FL (ref 11.5–15)
PLATELET # BLD: 299 E9/L (ref 130–450)
PMV BLD AUTO: 11.6 FL (ref 7–12)
POTASSIUM SERPL-SCNC: 4.1 MMOL/L (ref 3.5–5)
RBC # BLD: 4.79 E12/L (ref 3.5–5.5)
REPORT: NORMAL
SODIUM BLD-SCNC: 140 MMOL/L (ref 132–146)
THIS TEST SENT TO: NORMAL
WBC # BLD: 6.7 E9/L (ref 4.5–11.5)
WHITE BLOOD CELLS (WBC), STOOL: NORMAL

## 2020-03-10 PROCEDURE — 85025 COMPLETE CBC W/AUTO DIFF WBC: CPT

## 2020-03-10 PROCEDURE — 6370000000 HC RX 637 (ALT 250 FOR IP): Performed by: INTERNAL MEDICINE

## 2020-03-10 PROCEDURE — 97535 SELF CARE MNGMENT TRAINING: CPT

## 2020-03-10 PROCEDURE — 80048 BASIC METABOLIC PNL TOTAL CA: CPT

## 2020-03-10 PROCEDURE — 6360000002 HC RX W HCPCS: Performed by: INTERNAL MEDICINE

## 2020-03-10 PROCEDURE — 2580000003 HC RX 258: Performed by: INTERNAL MEDICINE

## 2020-03-10 PROCEDURE — 82024 ASSAY OF ACTH: CPT

## 2020-03-10 PROCEDURE — 99238 HOSP IP/OBS DSCHRG MGMT 30/<: CPT | Performed by: INTERNAL MEDICINE

## 2020-03-10 PROCEDURE — C9113 INJ PANTOPRAZOLE SODIUM, VIA: HCPCS | Performed by: INTERNAL MEDICINE

## 2020-03-10 PROCEDURE — 36415 COLL VENOUS BLD VENIPUNCTURE: CPT

## 2020-03-10 PROCEDURE — 97530 THERAPEUTIC ACTIVITIES: CPT

## 2020-03-10 PROCEDURE — 82962 GLUCOSE BLOOD TEST: CPT

## 2020-03-10 RX ORDER — HYDROCORTISONE 10 MG/1
10 TABLET ORAL DAILY
Qty: 30 TABLET | Refills: 0 | Status: SHIPPED
Start: 2020-03-11 | End: 2020-03-10

## 2020-03-10 RX ORDER — HYDROCORTISONE 5 MG/1
5 TABLET ORAL EVERY EVENING
Qty: 30 TABLET | Refills: 0 | Status: SHIPPED
Start: 2020-03-10 | End: 2020-03-10

## 2020-03-10 RX ORDER — HYDROCORTISONE 10 MG/1
10 TABLET ORAL DAILY
Qty: 30 TABLET | Refills: 3 | Status: SHIPPED | OUTPATIENT
Start: 2020-03-11 | End: 2020-04-22

## 2020-03-10 RX ORDER — HYDROCORTISONE 5 MG/1
5 TABLET ORAL EVERY EVENING
Qty: 30 TABLET | Refills: 3 | Status: ON HOLD | OUTPATIENT
Start: 2020-03-10 | End: 2020-07-08 | Stop reason: ALTCHOICE

## 2020-03-10 RX ORDER — INSULIN GLARGINE 100 [IU]/ML
22 INJECTION, SOLUTION SUBCUTANEOUS NIGHTLY
Qty: 1 VIAL | Refills: 3 | Status: SHIPPED
Start: 2020-03-10 | End: 2020-03-10 | Stop reason: HOSPADM

## 2020-03-10 RX ORDER — INSULIN GLARGINE 100 [IU]/ML
40 INJECTION, SOLUTION SUBCUTANEOUS NIGHTLY
Status: ON HOLD | COMMUNITY
End: 2020-03-10 | Stop reason: SDUPTHER

## 2020-03-10 RX ORDER — INSULIN GLARGINE 100 [IU]/ML
25 INJECTION, SOLUTION SUBCUTANEOUS NIGHTLY
Qty: 5 PEN | Refills: 3 | Status: SHIPPED | OUTPATIENT
Start: 2020-03-10 | End: 2020-10-09 | Stop reason: SDUPTHER

## 2020-03-10 RX ADMIN — SODIUM CHLORIDE, PRESERVATIVE FREE 10 ML: 5 INJECTION INTRAVENOUS at 08:23

## 2020-03-10 RX ADMIN — INSULIN LISPRO 3 UNITS: 100 INJECTION, SOLUTION INTRAVENOUS; SUBCUTANEOUS at 13:20

## 2020-03-10 RX ADMIN — HYDROCORTISONE 10 MG: 5 TABLET ORAL at 11:41

## 2020-03-10 RX ADMIN — PANTOPRAZOLE SODIUM 40 MG: 40 INJECTION, POWDER, FOR SOLUTION INTRAVENOUS at 08:23

## 2020-03-10 RX ADMIN — INSULIN LISPRO 2 UNITS: 100 INJECTION, SOLUTION INTRAVENOUS; SUBCUTANEOUS at 13:19

## 2020-03-10 RX ADMIN — ENOXAPARIN SODIUM 40 MG: 40 INJECTION, SOLUTION INTRAVENOUS; SUBCUTANEOUS at 08:23

## 2020-03-10 RX ADMIN — INSULIN LISPRO 9 UNITS: 100 INJECTION, SOLUTION INTRAVENOUS; SUBCUTANEOUS at 08:23

## 2020-03-10 RX ADMIN — GABAPENTIN 600 MG: 600 TABLET, FILM COATED ORAL at 08:23

## 2020-03-10 ASSESSMENT — PAIN DESCRIPTION - DESCRIPTORS: DESCRIPTORS: ACHING

## 2020-03-10 ASSESSMENT — PAIN DESCRIPTION - LOCATION: LOCATION: ABDOMEN

## 2020-03-10 ASSESSMENT — PAIN - FUNCTIONAL ASSESSMENT: PAIN_FUNCTIONAL_ASSESSMENT: PREVENTS OR INTERFERES SOME ACTIVE ACTIVITIES AND ADLS

## 2020-03-10 ASSESSMENT — PAIN DESCRIPTION - PROGRESSION: CLINICAL_PROGRESSION: NOT CHANGED

## 2020-03-10 ASSESSMENT — PAIN DESCRIPTION - ONSET: ONSET: ON-GOING

## 2020-03-10 ASSESSMENT — PAIN DESCRIPTION - PAIN TYPE: TYPE: ACUTE PAIN

## 2020-03-10 ASSESSMENT — PAIN DESCRIPTION - FREQUENCY: FREQUENCY: CONTINUOUS

## 2020-03-10 ASSESSMENT — PAIN SCALES - GENERAL: PAINLEVEL_OUTOF10: 4

## 2020-03-10 ASSESSMENT — PAIN DESCRIPTION - ORIENTATION: ORIENTATION: LEFT

## 2020-03-10 NOTE — PROGRESS NOTES
outpatient diabetes education:   [x] Yes    [] No    [] Unsure    Recommended:     [] Consult to social work; patient has no insurance or financial hardship      [] Script for glucometer and supplies (per preference of patient's insurance)               [x] Script for outpatient diabetes education classes from PCP    [x] Carbohydrate-controlled diet       Thank you for this consult.

## 2020-03-10 NOTE — PROGRESS NOTES
House team resident notified that patient uses [employer] [employer] at home and is not used to the vials that were prescribed. Confirmed with patient that she does have [facility]. Per House team resident, patient can just use sliding scale for kwikpen. All discharge instructions reviewed with patient at bedside. Patient verbalizes understanding of all medications and importance of follow up appointments.

## 2020-03-10 NOTE — PROGRESS NOTES
Messaged on call MD Zeina Ramey about 0100 . Pt was asking if she needs insulin since she is started on cortisol now. Will see what MD says/orders.

## 2020-03-10 NOTE — PROGRESS NOTES
reacher requiring assist to maintain balance   SBA to janine/doff socks;   SBA simulating LE bathing with LH sponge                        Mod I   using AE as needed for safe reach/ energy conservation        Toileting NT n/t                        Mod I      Bed Mobility  Supine to sit: Quincy     Sit to supine: Quincy Supine>sit MOD I                        Mod I  in prep of ADL tasks & transfers   Functional Transfers Sit to stand: SBA     Stand to sit: SBA     Commode: SBA using rail Sit<>stand from EOB no AD MIN A with slight LOB                        Mod I  sit<>stand/functional bathroom transfers using AD/DME as needed for balance and safety   Functional Mobility SBA no device   n/t                       Mod I   functional/bathroom mobility using AD as needed & demonstrating G safety      Balance Sitting:     Static:  SBA    Dynamic:SBA  Standing: SBA Sitting:   Static: supervision  Dynamic: supervision   Standing: MIN A no AD                  Quincy dynamic sitting balance and [place]cy dynamic standing balance during ADL tasks & transfers   Endurance/Activity Tolerance    F tolerance with moderate activity F completing light ADL tasks     G   tolerance with moderate activity/self care routine   Visual/  Perceptual    WFL                                      Comments: Upon arrival pt long sitting in bed with over bed tray in front. Pt educated with regards to functional transfers, hand placement, bathroom safety, bating AE, DME, LE dressing AE techniques, ECT's. At end of session pt seated EOB with nursing and doctor present,  all lines and tubes intact, call light within reach. · Pt has made good progress towards set goals.    · Continue with current plan of care      Treatment Time In:1430            Treatment Time Out: 6436                Treatment Charges: Mins Units   Ther Ex  [id number]     Manual Therapy [phone]     Thera Activities [id number] 11 1   ADL/Home Mgt [id number] 95  1    Neuro Re-ed 83967     Group Therapy

## 2020-03-10 NOTE — DISCHARGE SUMMARY
[address]  Department of Internal Medicine   Internal Medicine Residency Program   Discharge Summary    PCP: none    House Team: 2    Admit Date:3/5/2020  Discharge Date: 3/10/2020    Admission Diagnosis:  Uncomplicated UTI  Colitis  Hypotension  Ho IDDM  Ho GERD  Ho Polyneuropathy    Discharge Diagnosis:    Hospital Course:  Burgess Chiu, a 38 yo F,  with PMH of  IDDM, polyneuropathy and GERD is admitted due to CC of nausea/vomiting and diarrhea.     Per patient, her symptoms started about 4 days ago. She has had nausea continuously with one episode of vomiting and about 1-2 soft stools per day. She was able to only eat applesauce and any other foods would aggravate her abdominal pain which she describes as burning pain. She denies any fever, chills, hematemesis or hematochezia. She reports feeling lightheaded and weak especially when she sits up or stands versus lying down. She attributes this to not eating or drinking much the last 4 days. She has tried to keep herself hydrated with some Gatorade intermittently. For her diabetes she is on Lantus 20 daily and lispro sliding scale and another oral anti-hyperglycemic medication she does not remember the name of (not metformin). She reports burning and pain while urinating; the symptoms started around the same time her nausea vomiting started. She denies hematuria. Has a history of polyneuropathy and takes gabapentin 600 mg twice daily.   She was hypotensive in the ED with blood pressure systolic in 13P after receiving 2 L of normal saline. She received another liter of normal saline and her blood pressure improved to 09S systolic   She has a history of C. difficile about a year ago after using antibiotics for 2 months to heal the wounds on her toes on the right foot as well as the left foot. She has a history of cholecystectomy. Denies smoking tobacco or using alcohol or illicit drugs. ED Course: CT abdomen shows infectious vs inflammatory colitis. Lab work was unremarkable except for slightly elevated alk phos 108 and AST 35. BMP and CBC WNL. ED Meds: Patient was given metronidazole and levofloxacin. ED Fluids: Patient was given NS 3L  During her admission, patients UTI was treated as well as being treated for her Colitis with abx. GI consult saw patient and stopped antibiotics and did symptom management for gaseous distention. GI suggested IBS the cause of her symptoms. She was started on fluids for hypotension. Patients cortisol was found to be low, further testing suggested adrenal insufficiency. Patient was started on hydrocortisone. Today, patient is stable. Blood pressure has resolved and she is doing well on hydrocortisone. Will follow up ACTH as outpatient and additional treatment. Patient UTI has resolved and diarrhea resolved as well. Will discharge today. She was newly diagnosed with adrenal insufficiency during hospitalization. She was discharged on Hydrocortisone 10 mg in the morning and 5 mg in the afternoon. She was also referred to Endocrinologist for out patient follow-up at 07/27/2020.     We have adjusted patient's insulin regime long acting insulin Basaglar to 25 units nightly with short acting Humalog 5 units with each meal and a sliding scale of Humalog as follows  If blood sugars are 150-200 -add 1 units of Humalog   If blood sugars are 201-250 - add 2 units of Humalog   If blood sugars are 251-300 - add 3 units of Humalog   If blood sugars are 301-350 - add 4 units of Humalog   If blood sugars are above 350 - add 5 units of Humalog    Significant findings (history and exam, laboratory, radiological, pathology, other tests):  CBC:   Lab Results   Component Value Date    WBC 6.7 03/10/2020    RBC 4.79 03/10/2020    HGB 12.2 03/10/2020    HCT 38.6 03/10/2020    MCV 80.6 03/10/2020    MCH 25.5 03/10/2020    MCHC 31.6 such as Crohn's or ulcerative colitis. Infectious enteritis could result in a similar appearance as could less likely, ischemia or malignancy. Thick-walled trabeculated urinary bladder which could be due to chronic infectious or interstitial cystitis with malignancy to be excluded. Follow-up cystoscopy is recommended. Stable 4 mm nodule left lower lobe lung. Interstitial thickening compared to the prior study which could reflect fluid overload or other interstitial disease.        Pending test results: None    Consults: None    Procedures: None    Condition at discharge: Improved, stable    Disposition: home   Holli Negron, [address] [facility] [address] Medication Instructions VKO:[id number]    Printed on:03/10/20 3292   Medication Information                      dicyclomine (BENTYL) 10 MG capsule  Take 1 capsule by mouth 3 times daily for 7 days             GABAPENTIN PO  Take 600 mg by mouth 2 times daily (with meals)              hydrocortisone (CORTEF) 10 MG tablet  Take 1 tablet by mouth daily             hydrocortisone (CORTEF) 5 MG tablet  Take 1 tablet by mouth every evening             ibuprofen (ADVIL;MOTRIN) 800 MG tablet  Take 1 tablet by mouth every 6 hours as needed for Pain             insulin glargine (BASAGLAR KWIKPEN) 100 UNIT/ML injection pen  Inject 25 Units into the skin nightly             insulin lispro (HUMALOG) 100 UNIT/ML injection vial  Take 5 units of Humalog with eat meal  plus If blood sugars are 150-200 -add 1 units of Humalog If blood sugars are 201-250 - add 2 units of Humalog If blood sugars are 251-300 - add 3 units of Humalog If blood sugars are 301-350 - add 4 units of Humalog If blood sugars are above 350 - add             omeprazole (PRILOSEC) 40 MG delayed release capsule  Take 1 capsule by mouth daily             raNITIdine HCl (RANITIDINE 150 MAX STRENGTH PO)  Take 300 mg by mouth daily                     Activity: activity as tolerated  Diet: regular diet        Danny Neri DO, PGY-1    Arlin Gao MD, PGY - 3   1:56 PM  3/10/2020    Attending physician: Dr. Pato Marie

## 2020-03-10 NOTE — PLAN OF CARE
Problem: Cardiac Output - Decreased:  Goal: Avoidance of environmental tobacco smoke  Description: Absence of orthostatic hypotension  Outcome: Met This Shift     Problem: Cardiac Output - Decreased:  Goal: Cardiac output within specified parameters  Description: Cardiac output within specified parameters  Outcome: Met This Shift     Problem: Cardiac Output - Decreased:  Goal: Hemodynamic stability will improve  Description: Hemodynamic stability will improve  Outcome: Met This Shift     Problem: Fluid Volume - Imbalance:  Goal: Absence of imbalanced fluid volume signs and symptoms  Description: Absence of imbalanced fluid volume signs and symptoms  Outcome: Met This Shift     Problem: Tissue Perfusion, Altered:  Goal: Circulatory function within specified parameters  Description: Circulatory function within specified parameters  Outcome: Met This Shift     Problem: Nausea/Vomiting:  Goal: Absence of nausea/vomiting  Description: Absence of nausea/vomiting  3/10/2020 1322 by Alvaro Us RN  Outcome: Met This Shift     Problem: Nausea/Vomiting:  Goal: Able to drink  Description: Able to drink  Outcome: Met This Shift     Problem: Nausea/Vomiting:  Goal: Able to eat  Description: Able to eat  Outcome: Met This Shift     Problem: Nausea/Vomiting:  Goal: Ability to achieve adequate nutritional intake will improve  Description: Ability to achieve adequate nutritional intake will improve  Outcome: Met This Shift     Problem: Pain:  Goal: Pain level will decrease  Description: Pain level will decrease  Outcome: Met This Shift     Problem: Pain:  Goal: Control of acute pain  Description: Control of acute pain  Outcome: Met This Shift     Problem: Pain:  Goal: Control of chronic pain  Description: Control of chronic pain  Outcome: Met This Shift     Problem: Musculor/Skeletal Functional Status  Goal: Highest potential functional level  Outcome: Met This Shift     Problem: Musculor/Skeletal Functional Status  Goal: Absence of falls  Outcome: Met This Shift

## 2020-03-11 LAB — CULTURE, STOOL: NORMAL

## 2020-03-11 NOTE — PROGRESS NOTES
CLINICAL PHARMACY NOTE: MEDS TO [address] Select Patient?: No  Total # of Prescriptions Filled: 1   The following medications were delivered to the patient:  · Hydrocortisone 5  Total # of Interventions Completed: 2  Time Spent (min): 30    Additional Documentation:

## 2020-03-12 LAB — ADRENOCORTICOTROPIC HORMONE: 50.4 PG/ML (ref 7.2–63.3)

## 2020-03-13 LAB — CALPROTECTIN: 223 UG/G

## 2020-04-16 ENCOUNTER — TELEPHONE (OUTPATIENT)
Dept: CARDIOLOGY CLINIC | Age: 35
End: 2020-04-16

## 2020-04-16 NOTE — TELEPHONE ENCOUNTER
We see that you have an appointment scheduled, given the current COVID-19 pandemic, Dr. Fahad Muhammad would like to change that visit to a video visit. In order to schedule this appointment you will need a camera-enabled device (smart phone, tablet or computer) and reliable WiFi. Do you wish to schedule this appointment? Yes    We want to confirm that, for purposes of billing, this is a virtual visit with your provider for which we will submit a claim for reimbursement with your insurance company. You will be responsible for any copays, coinsurance amounts or other amounts not covered by your insurance company. If you do not accept this, unfortunately we will not be able to schedule a virtual visit with the provider. Do you accept?  Yes

## 2020-04-22 ENCOUNTER — VIRTUAL VISIT (OUTPATIENT)
Dept: CARDIOLOGY CLINIC | Age: 35
End: 2020-04-22
Payer: COMMERCIAL

## 2020-04-22 VITALS — BODY MASS INDEX: 41.41 KG/M2 | WEIGHT: 225 LBS | HEIGHT: 62 IN

## 2020-04-22 PROCEDURE — 99202 OFFICE O/P NEW SF 15 MIN: CPT | Performed by: INTERNAL MEDICINE

## 2020-04-22 RX ORDER — LIRAGLUTIDE 6 MG/ML
1.2 INJECTION SUBCUTANEOUS DAILY
COMMUNITY
End: 2021-01-14 | Stop reason: SDUPTHER

## 2020-04-22 ASSESSMENT — ENCOUNTER SYMPTOMS
COUGH: 0
ABDOMINAL PAIN: 1
BACK PAIN: 0
DIARRHEA: 1
SHORTNESS OF BREATH: 0
BLOOD IN STOOL: 0
WHEEZING: 0
CONSTIPATION: 0
NAUSEA: 0
VOMITING: 0

## 2020-04-22 NOTE — PROGRESS NOTES
file     Minutes per session: Not on file    Stress: Not on file   Relationships    Social connections     Talks on phone: Not on file     Gets together: Not on file     Attends Christianity service: Not on file     Active member of club or organization: Not on file     Attends meetings of clubs or organizations: Not on file     Relationship status: Not on file    Intimate partner violence     Fear of current or ex partner: Not on file     Emotionally abused: Not on file     Physically abused: Not on file     Forced sexual activity: Not on file   Other Topics Concern    Not on file   Social History Narrative    Not on file       Allergies: Allergies   Allergen Reactions    Rocephin [Ceftriaxone] Rash       Current Medications:  Current Outpatient Medications   Medication Sig Dispense Refill    Liraglutide (VICTOZA) 18 MG/3ML SOPN SC injection Inject 0.6 mg into the skin daily      hydrocortisone (CORTEF) 5 MG tablet Take 1 tablet by mouth every evening 30 tablet 3    insulin glargine (BASAGLAR KWIKPEN) 100 UNIT/ML injection pen Inject 25 Units into the skin nightly 5 pen 3    insulin lispro (HUMALOG) 100 UNIT/ML injection vial Take 5 units of Humalog with eat meal  plus If blood sugars are 150-200 -add 1 units of Humalog If blood sugars are 201-250 - add 2 units of Humalog If blood sugars are 251-300 - add 3 units of Humalog If blood sugars are 301-350 - add 4 units of Humalog If  above 350 - add 5 unit of humalog 1 vial 3    raNITIdine HCl (RANITIDINE 150 MAX STRENGTH PO) Take 300 mg by mouth daily      omeprazole (PRILOSEC) 40 MG delayed release capsule Take 1 capsule by mouth daily 30 capsule 0    GABAPENTIN PO Take 600 mg by mouth 2 times daily (with meals)        No current facility-administered medications for this visit.         Physical Exam:  Ht 5' 2\" (1.575 m)   Wt 225 lb (102.1 kg)   BMI 41.15 kg/m²   Wt Readings from Last 3 Encounters:   04/22/20 225 lb (102.1 kg)   03/05/20 200 lb (90.7 kg) 10/19/19 200 lb (90.7 kg)     Physical Exam:  Ht 5' 2\" (1.575 m)   Wt 225 lb (102.1 kg)   BMI 41.15 kg/m²   Wt Readings from Last 3 Encounters:   04/22/20 225 lb (102.1 kg)   03/05/20 200 lb (90.7 kg)   10/19/19 200 lb (90.7 kg)     Physical Exam   Not performed due to COVID-19 pandemic. Laboratory Tests:  Lab Results   Component Value Date    CREATININE 0.9 03/10/2020    BUN 16 03/10/2020     03/10/2020    K 4.1 03/10/2020     03/10/2020    CO2 27 03/10/2020     Lab Results   Component Value Date    MG 2.0 03/06/2020     Lab Results   Component Value Date    WBC 6.7 03/10/2020    HGB 12.2 03/10/2020    HCT 38.6 03/10/2020    MCV 80.6 03/10/2020     03/10/2020     Lab Results   Component Value Date    ALT 20 03/05/2020    AST 35 (H) 03/05/2020    ALKPHOS 108 (H) 03/05/2020    BILITOT 0.8 03/05/2020     No results found for: CKTOTAL, CKMB, CKMBINDEX, TROPONINI  Lab Results   Component Value Date    INR 1.2 05/01/2017    PROTIME 13.2 (H) 05/01/2017     No results found for: TSHFT4, TSH  Lab Results   Component Value Date    LABA1C 10.3 (H) 03/06/2020       Cardiac Tests:    Unable to perform due to COVID-19 Pandemic. ASSESSMENT / PLAN:  -Abnormal EKG: Patient could have underlying CAD due to positive family history, insulin-dependent diabetes mellitus and passive smoking. Chest discomfort could be due to chronic stable angina.  -Fatigue: Probably multifactorial in origin including adrenal insufficiency, obesity and deconditioning possible obstructive sleep apnea due to obesity.  -Dizziness: Could be due to hypotension due to adrenal insufficiency.  -Insulin-dependent diabetes mellitus: Uncontrolled based on elevated A1c in March of this year.  -Adrenal insufficiency.  -Polyneuropathy.  -History of colitis. -Bilateral foot drop.  -Obesity. Patient was asked to check her blood pressure.   Will need an echocardiogram to assess her left and right ventricular systolic function, RV systolic

## 2020-05-02 ENCOUNTER — APPOINTMENT (OUTPATIENT)
Dept: GENERAL RADIOLOGY | Age: 35
End: 2020-05-02
Payer: COMMERCIAL

## 2020-05-02 ENCOUNTER — HOSPITAL ENCOUNTER (EMERGENCY)
Age: 35
Discharge: HOME | End: 2020-05-02
Payer: COMMERCIAL

## 2020-05-02 VITALS
HEART RATE: 82 BPM | DIASTOLIC BLOOD PRESSURE: 88 MMHG | WEIGHT: 230 LBS | RESPIRATION RATE: 20 BRPM | TEMPERATURE: 98.9 F | BODY MASS INDEX: 42.07 KG/M2 | OXYGEN SATURATION: 100 % | SYSTOLIC BLOOD PRESSURE: 142 MMHG

## 2020-05-02 PROCEDURE — 99282 EMERGENCY DEPT VISIT SF MDM: CPT

## 2020-05-02 PROCEDURE — 73620 X-RAY EXAM OF FOOT: CPT

## 2020-05-02 RX ORDER — SULFAMETHOXAZOLE AND TRIMETHOPRIM 800; 160 MG/1; MG/1
1 TABLET ORAL 2 TIMES DAILY
Qty: 20 TABLET | Refills: 0 | Status: SHIPPED | OUTPATIENT
Start: 2020-05-02 | End: 2020-05-12

## 2020-05-02 ASSESSMENT — PAIN DESCRIPTION - ORIENTATION: ORIENTATION: RIGHT

## 2020-05-02 ASSESSMENT — PAIN DESCRIPTION - DESCRIPTORS: DESCRIPTORS: ACHING

## 2020-05-02 ASSESSMENT — PAIN SCALES - GENERAL: PAINLEVEL_OUTOF10: 4

## 2020-05-02 ASSESSMENT — PAIN DESCRIPTION - PAIN TYPE: TYPE: ACUTE PAIN

## 2020-05-02 ASSESSMENT — PAIN DESCRIPTION - LOCATION: LOCATION: TOE (COMMENT WHICH ONE)

## 2020-05-02 ASSESSMENT — PAIN DESCRIPTION - FREQUENCY: FREQUENCY: CONTINUOUS

## 2020-05-02 NOTE — ED PROVIDER NOTES
deficit: none. Sensory deficit: none. Pulse deficit: none. Capillary refill: normal.  Foot:             Tenderness:  none. Swelling: None. Deformity: no.            ROM: full range of motion. Skin:  no erythema, rash or wounds noted. Gait:  normal.  Lymphatics: No lymphangitis or adenopathy noted. Neurological:  Oriented. Motor functions intact. .    Lab / Imaging Results   (All laboratory and radiology results have been personally reviewed by myself)  Labs:  No results found for this visit on 05/02/20. Imaging: All Radiology results interpreted by Radiologist unless otherwise noted. XR FOOT RIGHT (2 VIEWS)   Final Result      NO ACUTE FRACTURE OR DISLOCATION RIGHT FOOT. ED Course / Medical Decision Making   Medications - No data to display       Consult(s):   None    Procedure(s):   none    MDM: Patient presenting with pain of her right toes and possible infection. Patient is in no acute distress, afebrile, nontoxic in appearance. Patient is unsure if she hit her toes off of something. Patient does have calluses of multiple toes. Patient does appear to have some erythema surrounding the callus of her right second digit. Patient's x-ray is negative. Patient will be sent home with Bactrim. Patient given a number for podiatrist and recommended follow-up for resolution of symptoms. Recommended patient return to the ED with any worsening of symptoms. Counseling: The emergency provider has spoken with the patient and discussed todays results, in addition to providing specific details for the plan of care and counseling regarding the diagnosis and prognosis. Questions are answered at this time and they are agreeable with the plan. Assessment      1.  Cellulitis of second toe of right foot      Plan   Discharge to home  Patient condition is stable    New Medications     Discharge Medication List as of 5/2/2020  3:53 PM      START taking these medications    Details   sulfamethoxazole-trimethoprim (BACTRIM DS) 800-160 MG per tablet Take 1 tablet by mouth 2 times daily for 10 days, Disp-20 tablet, R-0Print           Electronically signed by Jose Allen PA-C   DD: 5/2/20  **This report was transcribed using voice recognition software. Every effort was made to ensure accuracy; however, inadvertent computerized transcription errors may be present.   END OF ED PROVIDER NOTE     Jose Allen PA-C  05/02/20 1280

## 2020-05-29 ENCOUNTER — HOSPITAL ENCOUNTER (EMERGENCY)
Age: 35
Discharge: HOME | End: 2020-05-29
Attending: EMERGENCY MEDICINE
Payer: COMMERCIAL

## 2020-05-29 ENCOUNTER — APPOINTMENT (OUTPATIENT)
Dept: CT IMAGING | Age: 35
End: 2020-05-29
Payer: COMMERCIAL

## 2020-05-29 VITALS
BODY MASS INDEX: 44.16 KG/M2 | WEIGHT: 240 LBS | OXYGEN SATURATION: 97 % | TEMPERATURE: 97.6 F | DIASTOLIC BLOOD PRESSURE: 68 MMHG | HEART RATE: 86 BPM | SYSTOLIC BLOOD PRESSURE: 117 MMHG | RESPIRATION RATE: 18 BRPM | HEIGHT: 62 IN

## 2020-05-29 LAB
ALBUMIN SERPL-MCNC: 3.5 G/DL (ref 3.5–5.2)
ALP BLD-CCNC: 113 U/L (ref 35–104)
ALT SERPL-CCNC: 26 U/L (ref 0–32)
AMORPHOUS: ABNORMAL
ANION GAP SERPL CALCULATED.3IONS-SCNC: 11 MMOL/L (ref 7–16)
AST SERPL-CCNC: 25 U/L (ref 0–31)
BACTERIA: ABNORMAL /HPF
BASOPHILS ABSOLUTE: 0.04 E9/L (ref 0–0.2)
BASOPHILS RELATIVE PERCENT: 0.4 % (ref 0–2)
BILIRUB SERPL-MCNC: 0.4 MG/DL (ref 0–1.2)
BILIRUBIN URINE: NEGATIVE
BLOOD, URINE: NEGATIVE
BUN BLDV-MCNC: 18 MG/DL (ref 6–20)
CALCIUM SERPL-MCNC: 8.8 MG/DL (ref 8.6–10.2)
CHLORIDE BLD-SCNC: 99 MMOL/L (ref 98–107)
CLARITY: CLEAR
CO2: 28 MMOL/L (ref 22–29)
COLOR: YELLOW
CREAT SERPL-MCNC: 0.9 MG/DL (ref 0.5–1)
EOSINOPHILS ABSOLUTE: 0.17 E9/L (ref 0.05–0.5)
EOSINOPHILS RELATIVE PERCENT: 1.8 % (ref 0–6)
EPITHELIAL CELLS, UA: ABNORMAL /HPF
GFR AFRICAN AMERICAN: >60
GFR NON-AFRICAN AMERICAN: >60 ML/MIN/1.73
GLUCOSE BLD-MCNC: 197 MG/DL (ref 74–99)
GLUCOSE URINE: NEGATIVE MG/DL
HCG, URINE, POC: NEGATIVE
HCT VFR BLD CALC: 42.8 % (ref 34–48)
HEMOGLOBIN: 12.9 G/DL (ref 11.5–15.5)
IMMATURE GRANULOCYTES #: 0.03 E9/L
IMMATURE GRANULOCYTES %: 0.3 % (ref 0–5)
KETONES, URINE: NEGATIVE MG/DL
LEUKOCYTE ESTERASE, URINE: ABNORMAL
LIPASE: 45 U/L (ref 13–60)
LYMPHOCYTES ABSOLUTE: 1.41 E9/L (ref 1.5–4)
LYMPHOCYTES RELATIVE PERCENT: 15.1 % (ref 20–42)
Lab: NORMAL
MCH RBC QN AUTO: 26.2 PG (ref 26–35)
MCHC RBC AUTO-ENTMCNC: 30.1 % (ref 32–34.5)
MCV RBC AUTO: 87 FL (ref 80–99.9)
MONOCYTES ABSOLUTE: 0.67 E9/L (ref 0.1–0.95)
MONOCYTES RELATIVE PERCENT: 7.2 % (ref 2–12)
NEGATIVE QC PASS/FAIL: NORMAL
NEUTROPHILS ABSOLUTE: 7.02 E9/L (ref 1.8–7.3)
NEUTROPHILS RELATIVE PERCENT: 75.2 % (ref 43–80)
NITRITE, URINE: NEGATIVE
PDW BLD-RTO: 13.9 FL (ref 11.5–15)
PH UA: 7 (ref 5–9)
PLATELET # BLD: 271 E9/L (ref 130–450)
PMV BLD AUTO: 10.2 FL (ref 7–12)
POSITIVE QC PASS/FAIL: NORMAL
POTASSIUM REFLEX MAGNESIUM: 4.4 MMOL/L (ref 3.5–5)
PROTEIN UA: NEGATIVE MG/DL
RBC # BLD: 4.92 E12/L (ref 3.5–5.5)
RBC UA: ABNORMAL /HPF (ref 0–2)
SODIUM BLD-SCNC: 138 MMOL/L (ref 132–146)
SPECIFIC GRAVITY UA: 1.02 (ref 1–1.03)
TOTAL PROTEIN: 7.8 G/DL (ref 6.4–8.3)
UROBILINOGEN, URINE: 0.2 E.U./DL
WBC # BLD: 9.3 E9/L (ref 4.5–11.5)
WBC UA: ABNORMAL /HPF (ref 0–5)

## 2020-05-29 PROCEDURE — 83690 ASSAY OF LIPASE: CPT

## 2020-05-29 PROCEDURE — 85025 COMPLETE CBC W/AUTO DIFF WBC: CPT

## 2020-05-29 PROCEDURE — 6360000004 HC RX CONTRAST MEDICATION: Performed by: RADIOLOGY

## 2020-05-29 PROCEDURE — 81001 URINALYSIS AUTO W/SCOPE: CPT

## 2020-05-29 PROCEDURE — 74177 CT ABD & PELVIS W/CONTRAST: CPT

## 2020-05-29 PROCEDURE — 96375 TX/PRO/DX INJ NEW DRUG ADDON: CPT

## 2020-05-29 PROCEDURE — 96374 THER/PROPH/DIAG INJ IV PUSH: CPT

## 2020-05-29 PROCEDURE — 96361 HYDRATE IV INFUSION ADD-ON: CPT

## 2020-05-29 PROCEDURE — 99284 EMERGENCY DEPT VISIT MOD MDM: CPT

## 2020-05-29 PROCEDURE — 2580000003 HC RX 258: Performed by: EMERGENCY MEDICINE

## 2020-05-29 PROCEDURE — 6370000000 HC RX 637 (ALT 250 FOR IP): Performed by: EMERGENCY MEDICINE

## 2020-05-29 PROCEDURE — 80053 COMPREHEN METABOLIC PANEL: CPT

## 2020-05-29 PROCEDURE — 6360000002 HC RX W HCPCS: Performed by: EMERGENCY MEDICINE

## 2020-05-29 RX ORDER — AMOXICILLIN AND CLAVULANATE POTASSIUM 875; 125 MG/1; MG/1
1 TABLET, FILM COATED ORAL 2 TIMES DAILY WITH MEALS
Qty: 20 TABLET | Refills: 0 | Status: SHIPPED | OUTPATIENT
Start: 2020-05-29 | End: 2020-06-08

## 2020-05-29 RX ORDER — MORPHINE SULFATE 4 MG/ML
4 INJECTION, SOLUTION INTRAMUSCULAR; INTRAVENOUS ONCE
Status: COMPLETED | OUTPATIENT
Start: 2020-05-29 | End: 2020-05-29

## 2020-05-29 RX ORDER — ONDANSETRON 2 MG/ML
4 INJECTION INTRAMUSCULAR; INTRAVENOUS ONCE
Status: COMPLETED | OUTPATIENT
Start: 2020-05-29 | End: 2020-05-29

## 2020-05-29 RX ORDER — DICYCLOMINE HYDROCHLORIDE 10 MG/1
20 CAPSULE ORAL 4 TIMES DAILY PRN
Qty: 360 CAPSULE | Refills: 0 | Status: ON HOLD | OUTPATIENT
Start: 2020-05-29 | End: 2020-07-08 | Stop reason: ALTCHOICE

## 2020-05-29 RX ORDER — ONDANSETRON 8 MG/1
8 TABLET, ORALLY DISINTEGRATING ORAL EVERY 8 HOURS PRN
Qty: 12 TABLET | Refills: 0 | Status: SHIPPED | OUTPATIENT
Start: 2020-05-29 | End: 2020-07-20 | Stop reason: SDUPTHER

## 2020-05-29 RX ORDER — 0.9 % SODIUM CHLORIDE 0.9 %
1000 INTRAVENOUS SOLUTION INTRAVENOUS ONCE
Status: COMPLETED | OUTPATIENT
Start: 2020-05-29 | End: 2020-05-29

## 2020-05-29 RX ADMIN — SODIUM CHLORIDE 1000 ML: 9 INJECTION, SOLUTION INTRAVENOUS at 04:28

## 2020-05-29 RX ADMIN — LIDOCAINE HYDROCHLORIDE: 20 SOLUTION ORAL; TOPICAL at 06:47

## 2020-05-29 RX ADMIN — ONDANSETRON 4 MG: 2 INJECTION INTRAMUSCULAR; INTRAVENOUS at 04:29

## 2020-05-29 RX ADMIN — MORPHINE SULFATE 4 MG: 4 INJECTION, SOLUTION INTRAMUSCULAR; INTRAVENOUS at 04:29

## 2020-05-29 RX ADMIN — IOPAMIDOL 110 ML: 755 INJECTION, SOLUTION INTRAVENOUS at 05:51

## 2020-05-29 ASSESSMENT — PAIN DESCRIPTION - DESCRIPTORS: DESCRIPTORS: BURNING

## 2020-05-29 ASSESSMENT — PAIN SCALES - GENERAL
PAINLEVEL_OUTOF10: 9
PAINLEVEL_OUTOF10: 9

## 2020-05-29 ASSESSMENT — PAIN DESCRIPTION - LOCATION: LOCATION: ABDOMEN

## 2020-05-29 NOTE — ED PROVIDER NOTES
HPI:  20,   Time: 3:16 AM EDT       Leana Dwyer is a 37 y.o. female presenting to the ED for abdominal pain, beginning 2 hours ago. The complaint has been persistent, moderate in severity, and worsened by nothing. Patient states that she has been having left-sided abdominal pain rating into her left flank for the last 2 hours. She describes it as sharp and stabbing. She states she has had nausea associated with the pain and one episode of nonbilious nonbloody vomiting. She states that when she gets severe pain she feels dizzy. She states is been intermittent but due to the persistent intermittent pain she came to the ED for further evaluation. Patient denies any urinary symptoms. No fevers or chills. Review of Systems:   Pertinent positives and negatives are stated within HPI, all other systems reviewed and are negative.          --------------------------------------------- PAST HISTORY ---------------------------------------------  Past Medical History:  has a past medical history of Diabetes mellitus ([facility] Past Surgical History:  has a past surgical history that includes Cholecystectomy and  section. Social History:  reports that she is a non-smoker but has been exposed to tobacco smoke. She has never used smokeless tobacco. She reports that she does not drink alcohol or use drugs. Family History: family history includes Heart Attack in her father. The patients home medications have been reviewed.     Allergies: Rocephin [ceftriaxone]        ---------------------------------------------------PHYSICAL EXAM--------------------------------------    Constitutional/General: Alert and oriented x3, well appearing, non toxic in NAD  Head: Normocephalic and atraumatic  Eyes: PERRL, EOMI, conjunctive normal, sclera non icteric  Mouth: Oropharynx clear, handling secretions, no trismus, no asymmetry of the posterior oropharynx or uvular edema  Neck: Supple, full ROM, non mmol/L    Potassium reflex Magnesium 4.4 3.5 - 5.0 mmol/L    Chloride 99 98 - 107 mmol/L    CO2 28 22 - 29 mmol/L    Anion Gap 11 7 - 16 mmol/L    Glucose 197 (H) 74 - 99 mg/dL    BUN 18 6 - 20 mg/dL    CREATININE 0.9 0.5 - 1.0 mg/dL    GFR Non-African American >60 >=60 mL/min/1.73    GFR African American >60     Calcium 8.8 8.6 - 10.2 mg/dL    Total Protein 7.8 6.4 - 8.3 g/dL    Alb 3.5 3.5 - 5.2 g/dL    Total Bilirubin 0.4 0.0 - 1.2 mg/dL    Alkaline Phosphatase 113 (H) 35 - 104 U/L    ALT 26 0 - 32 U/L    AST 25 0 - 31 U/L   Lipase   Result Value Ref Range    Lipase 45 13 - 60 U/L   Urinalysis, reflex to microscopic   Result Value Ref Range    Color, UA Yellow Straw/Yellow    Clarity, UA Clear Clear    Glucose, Ur Negative Negative mg/dL    Bilirubin Urine Negative Negative    Ketones, Urine Negative Negative mg/dL    Specific Gravity, UA 1.020 1.005 - 1.030    Blood, Urine Negative Negative    pH, UA 7.0 5.0 - 9.0    Protein, UA Negative Negative mg/dL    Urobilinogen, Urine 0.2 <2.0 E.U./dL    Nitrite, Urine Negative Negative    Leukocyte Esterase, Urine SMALL (A) Negative   Microscopic Urinalysis   Result Value Ref Range    WBC, UA 1-3 0 - 5 /HPF    RBC, UA NONE 0 - 2 /HPF    Epithelial Cells, UA FEW /HPF    Bacteria, UA FEW (A) None Seen /HPF    Amorphous, UA RARE    POC Pregnancy Urine   Result Value Ref Range    HCG, Urine, POC Negative Negative    Lot Number [id number]     Positive QC Pass/Fail Pass     Negative QC Pass/Fail Pass        RADIOLOGY:  Interpreted by Radiologist.  CT ABDOMEN PELVIS W IV CONTRAST Additional Contrast? None   Final Result   Small bowel wall thickening within jejunal loops which are mildly   dilated. Ileal loops are dilated and fluid-filled but without wall   thickening. There is less wall thickening then the prior study. There   has been resolution of most of the infiltrative changes in the   mesenteric fat and resolution of the pelvic fluid.       Findings most likely reflect enteritis/Crohn's disease.                   ------------------------- NURSING NOTES AND VITALS REVIEWED ---------------------------   The nursing notes within the ED encounter and vital signs as below have been reviewed by myself. /73   Pulse 92   Temp 97.4 °F (36.3 °C)   Resp 16   Ht 5' 2\" (1.575 m)   Wt 240 lb (108.9 kg)   SpO2 94%   BMI 43.90 kg/m²   Oxygen Saturation Interpretation: Normal    The patients available past medical records and past encounters were reviewed. ------------------------------ ED COURSE/MEDICAL DECISION MAKING----------------------  Medications   0.9 % sodium chloride bolus (0 mLs Intravenous Stopped 5/29/20 0176)   ondansetron (ZOFRAN) injection 4 mg (4 mg Intravenous Given 5/29/20 1302)   morphine sulfate (PF) injection 4 mg (4 mg Intravenous Given 5/29/20 3179)   iopamidol (ISOVUE-370) 76 % injection 110 mL (110 mLs Intravenous Given 5/29/20 1104)   aluminum & magnesium hydroxide-simethicone (MAALOX) 30 mL, lidocaine viscous hcl (XYLOCAINE) 5 mL (GI COCKTAIL) ( Oral Given 5/29/20 5393)         ED COURSE:       Medical Decision Making: This is a 75-year-old female presented to the ED for abdominal pain. She underwent laboratory work-up here in the emergency department which revealed a normal CBC. Normal chemistry except for glucose of 197. Urinalysis negative. Lipase within normal limits. Pregnancy test negative. CT abdomen pelvis showed signs concerning for enteritis versus Crohn's disease. Patient was given fluids Zofran and morphine. On reexamination patient abdomen soft nontender. Patient tolerating p.o. Vital signs remained stable. No hypotension noted. No signs of adrenal crisis at this time with normal electrolytes. Therefore the patient be discharged home with conservative treatment. She will follow-up with GI. Return precautions given. Patient agrees with plan.     I, Dr. Nicky Salas, am the primary provider for this encounter    This

## 2020-06-04 ENCOUNTER — TELEPHONE (OUTPATIENT)
Dept: CARDIOLOGY | Age: 35
End: 2020-06-04

## 2020-06-27 ENCOUNTER — HOSPITAL ENCOUNTER (EMERGENCY)
Age: 35
Discharge: HOME | End: 2020-06-27
Payer: COMMERCIAL

## 2020-06-27 ENCOUNTER — APPOINTMENT (OUTPATIENT)
Dept: GENERAL RADIOLOGY | Age: 35
End: 2020-06-27
Payer: COMMERCIAL

## 2020-06-27 VITALS
RESPIRATION RATE: 17 BRPM | TEMPERATURE: 97.8 F | DIASTOLIC BLOOD PRESSURE: 69 MMHG | OXYGEN SATURATION: 98 % | HEART RATE: 72 BPM | SYSTOLIC BLOOD PRESSURE: 124 MMHG

## 2020-06-27 LAB
ALBUMIN SERPL-MCNC: 3.6 G/DL (ref 3.5–5.2)
ALP BLD-CCNC: 146 U/L (ref 35–104)
ALT SERPL-CCNC: 35 U/L (ref 0–32)
ANION GAP SERPL CALCULATED.3IONS-SCNC: 12 MMOL/L (ref 7–16)
AST SERPL-CCNC: 45 U/L (ref 0–31)
BASOPHILS ABSOLUTE: 0.09 E9/L (ref 0–0.2)
BASOPHILS RELATIVE PERCENT: 1 % (ref 0–2)
BILIRUB SERPL-MCNC: 0.3 MG/DL (ref 0–1.2)
BUN BLDV-MCNC: 10 MG/DL (ref 6–20)
C-REACTIVE PROTEIN: 5.6 MG/DL (ref 0–0.4)
CALCIUM SERPL-MCNC: 9.3 MG/DL (ref 8.6–10.2)
CHLORIDE BLD-SCNC: 96 MMOL/L (ref 98–107)
CO2: 27 MMOL/L (ref 22–29)
CREAT SERPL-MCNC: 0.9 MG/DL (ref 0.5–1)
EOSINOPHILS ABSOLUTE: 0.31 E9/L (ref 0.05–0.5)
EOSINOPHILS RELATIVE PERCENT: 3.6 % (ref 0–6)
GFR AFRICAN AMERICAN: >60
GFR NON-AFRICAN AMERICAN: >60 ML/MIN/1.73
GLUCOSE BLD-MCNC: 243 MG/DL (ref 74–99)
HCT VFR BLD CALC: 40.6 % (ref 34–48)
HEMOGLOBIN: 13.2 G/DL (ref 11.5–15.5)
IMMATURE GRANULOCYTES #: 0.03 E9/L
IMMATURE GRANULOCYTES %: 0.3 % (ref 0–5)
LACTIC ACID: 1.8 MMOL/L (ref 0.5–2.2)
LYMPHOCYTES ABSOLUTE: 2.82 E9/L (ref 1.5–4)
LYMPHOCYTES RELATIVE PERCENT: 32.4 % (ref 20–42)
MCH RBC QN AUTO: 26.1 PG (ref 26–35)
MCHC RBC AUTO-ENTMCNC: 32.5 % (ref 32–34.5)
MCV RBC AUTO: 80.2 FL (ref 80–99.9)
MONOCYTES ABSOLUTE: 0.55 E9/L (ref 0.1–0.95)
MONOCYTES RELATIVE PERCENT: 6.3 % (ref 2–12)
NEUTROPHILS ABSOLUTE: 4.91 E9/L (ref 1.8–7.3)
NEUTROPHILS RELATIVE PERCENT: 56.4 % (ref 43–80)
PDW BLD-RTO: 13.3 FL (ref 11.5–15)
PLATELET # BLD: 317 E9/L (ref 130–450)
PMV BLD AUTO: 11.3 FL (ref 7–12)
POTASSIUM REFLEX MAGNESIUM: 4.9 MMOL/L (ref 3.5–5)
RBC # BLD: 5.06 E12/L (ref 3.5–5.5)
SEDIMENTATION RATE, ERYTHROCYTE: 97 MM/HR (ref 0–20)
SODIUM BLD-SCNC: 135 MMOL/L (ref 132–146)
TOTAL PROTEIN: 8.1 G/DL (ref 6.4–8.3)
WBC # BLD: 8.7 E9/L (ref 4.5–11.5)

## 2020-06-27 PROCEDURE — 73620 X-RAY EXAM OF FOOT: CPT

## 2020-06-27 PROCEDURE — 80053 COMPREHEN METABOLIC PANEL: CPT

## 2020-06-27 PROCEDURE — 85025 COMPLETE CBC W/AUTO DIFF WBC: CPT

## 2020-06-27 PROCEDURE — 83605 ASSAY OF LACTIC ACID: CPT

## 2020-06-27 PROCEDURE — 6370000000 HC RX 637 (ALT 250 FOR IP): Performed by: NURSE PRACTITIONER

## 2020-06-27 PROCEDURE — 85651 RBC SED RATE NONAUTOMATED: CPT

## 2020-06-27 PROCEDURE — 99283 EMERGENCY DEPT VISIT LOW MDM: CPT

## 2020-06-27 PROCEDURE — 86140 C-REACTIVE PROTEIN: CPT

## 2020-06-27 RX ORDER — SULFAMETHOXAZOLE AND TRIMETHOPRIM 800; 160 MG/1; MG/1
2 TABLET ORAL ONCE
Status: COMPLETED | OUTPATIENT
Start: 2020-06-27 | End: 2020-06-27

## 2020-06-27 RX ORDER — ACETAMINOPHEN 325 MG/1
650 TABLET ORAL ONCE
Status: COMPLETED | OUTPATIENT
Start: 2020-06-27 | End: 2020-06-27

## 2020-06-27 RX ORDER — SULFAMETHOXAZOLE AND TRIMETHOPRIM 800; 160 MG/1; MG/1
2 TABLET ORAL 2 TIMES DAILY
Qty: 40 TABLET | Refills: 0 | Status: ON HOLD | OUTPATIENT
Start: 2020-06-27 | End: 2020-07-05

## 2020-06-27 RX ADMIN — ACETAMINOPHEN 650 MG: 325 TABLET ORAL at 17:09

## 2020-06-27 RX ADMIN — SULFAMETHOXAZOLE AND TRIMETHOPRIM 2 TABLET: 800; 160 TABLET ORAL at 19:47

## 2020-06-27 ASSESSMENT — PAIN SCALES - GENERAL
PAINLEVEL_OUTOF10: 6
PAINLEVEL_OUTOF10: 6
PAINLEVEL_OUTOF10: 5

## 2020-06-27 ASSESSMENT — PAIN DESCRIPTION - LOCATION: LOCATION: TOE (COMMENT WHICH ONE)

## 2020-06-27 ASSESSMENT — PAIN DESCRIPTION - PAIN TYPE: TYPE: ACUTE PAIN

## 2020-06-27 ASSESSMENT — PAIN DESCRIPTION - ORIENTATION: ORIENTATION: RIGHT

## 2020-06-27 NOTE — ED NOTES
Pt alert and oriented x4. Speech clear. Respirations easy/unlabored. Skin warm/dry. Appropriate color. No signs of acute distress noted. Pt/family teaching provided; verbalized understanding. Pt stable for discharge.        Javier Russell RN  06/27/20 1589

## 2020-06-27 NOTE — ED PROVIDER NOTES
drink alcohol or use drugs. Family History: family history includes Heart Attack in her father. Allergies: Rocephin [ceftriaxone]    Physical Exam            ED Triage Vitals [06/27/20 1629]   BP Temp Temp Source Pulse Resp SpO2 Height Weight   -- 97.5 °F (36.4 °C) Tympanic 74 16 96 % -- --       Oxygen Saturation Interpretation: Normal.    Constitutional:  Alert, development consistent with age. Neck:  Normal ROM. Supple. Toe(s):   right. 2nd             Tenderness: moderate. Swelling: Mild. Deformity: no.              ROM: diminished range with pain. Skin:  Swelling and open wound present to medial aspect of right second toe. Surrounding erythema. No drainage. scabbed wound noted to volar aspect. Neurovascular: Motor deficit: none. Sensory deficit: none. Pulse deficit: none. Capillary refill: normal.  Foot:             Tenderness:  none. Swelling: None. Deformity: no.            ROM: full range of motion. Skin:  no erythema, rash or wounds noted. Calluses present. No neurovascular deficits. Compartments soft incompressible. Gait:  normal.  Lymphatics: No lymphangitis or adenopathy noted. Neurological:  Oriented. Motor functions intact. .    Right 2nd toe      Right 2nd toe        Right 2nd toe  Lab / Imaging Results   (All laboratory and radiology results have been personally reviewed by myself)  Labs:  Results for orders placed or performed during the hospital encounter of 06/27/20   CBC Auto Differential   Result Value Ref Range    WBC 8.7 4.5 - 11.5 E9/L    RBC 5.06 3.50 - 5.50 E12/L    Hemoglobin 13.2 11.5 - 15.5 g/dL    Hematocrit 40.6 34.0 - 48.0 %    MCV 80.2 80.0 - 99.9 fL    MCH 26.1 26.0 - 35.0 pg    MCHC 32.5 32.0 - 34.5 %    RDW 13.3 11.5 - 15.0 fL    Platelets 238 956 - 510 E9/L    MPV 11.3 7.0 - 12.0 fL    Neutrophils % 56.4 43.0 - 80.0 %    Immature Granulocytes % and discussed todays results, in addition to providing specific details for the plan of care and counseling regarding the diagnosis and prognosis. Questions are answered at this time and they are agreeable with the plan. Assessment      1. Open toe wound, initial encounter      Plan   Discharge to home  Patient condition is good    New Medications     Discharge Medication List as of 6/27/2020  7:44 PM      START taking these medications    Details   sulfamethoxazole-trimethoprim (BACTRIM DS) 800-160 MG per tablet Take 2 tablets by mouth 2 times daily for 10 days, Disp-40 tablet, R-0Print           Electronically signed by LAUREN Morales CNP   DD: 6/27/20  **This report was transcribed using voice recognition software. Every effort was made to ensure accuracy; however, inadvertent computerized transcription errors may be present.   END OF ED PROVIDER NOTE      LAUREN Morales CNP  06/27/20 5984

## 2020-06-29 ENCOUNTER — HOSPITAL ENCOUNTER (EMERGENCY)
Age: 35
Discharge: HOME | End: 2020-06-29
Attending: EMERGENCY MEDICINE
Payer: COMMERCIAL

## 2020-06-29 VITALS
DIASTOLIC BLOOD PRESSURE: 82 MMHG | HEIGHT: 62 IN | SYSTOLIC BLOOD PRESSURE: 118 MMHG | HEART RATE: 92 BPM | RESPIRATION RATE: 17 BRPM | OXYGEN SATURATION: 94 % | WEIGHT: 225 LBS | TEMPERATURE: 97.4 F | BODY MASS INDEX: 41.41 KG/M2

## 2020-06-29 PROCEDURE — 6370000000 HC RX 637 (ALT 250 FOR IP): Performed by: GENERAL PRACTICE

## 2020-06-29 PROCEDURE — 99282 EMERGENCY DEPT VISIT SF MDM: CPT

## 2020-06-29 RX ORDER — DOXYCYCLINE HYCLATE 100 MG
100 TABLET ORAL 2 TIMES DAILY
Qty: 14 TABLET | Refills: 0 | Status: ON HOLD | OUTPATIENT
Start: 2020-06-29 | End: 2020-07-13 | Stop reason: HOSPADM

## 2020-06-29 RX ORDER — CETIRIZINE HYDROCHLORIDE 10 MG/1
10 TABLET ORAL DAILY
Qty: 30 TABLET | Refills: 0 | Status: SHIPPED | OUTPATIENT
Start: 2020-06-29 | End: 2021-02-25 | Stop reason: SDUPTHER

## 2020-06-29 RX ORDER — CETIRIZINE HYDROCHLORIDE 10 MG/1
10 TABLET ORAL ONCE
Status: DISCONTINUED | OUTPATIENT
Start: 2020-06-29 | End: 2020-06-29 | Stop reason: HOSPADM

## 2020-06-29 RX ORDER — DOXYCYCLINE HYCLATE 100 MG/1
100 CAPSULE ORAL ONCE
Status: COMPLETED | OUTPATIENT
Start: 2020-06-29 | End: 2020-06-29

## 2020-06-29 RX ORDER — DIAPER,BRIEF,INFANT-TODD,DISP
EACH MISCELLANEOUS ONCE
Status: COMPLETED | OUTPATIENT
Start: 2020-06-29 | End: 2020-06-29

## 2020-06-29 RX ADMIN — DOXYCYCLINE HYCLATE 100 MG: 100 CAPSULE ORAL at 05:31

## 2020-06-29 RX ADMIN — BACITRACIN ZINC: 500 OINTMENT TOPICAL at 05:31

## 2020-06-29 ASSESSMENT — ENCOUNTER SYMPTOMS
WHEEZING: 0
SHORTNESS OF BREATH: 0
DIARRHEA: 0
CHEST TIGHTNESS: 0
CHOKING: 0
COUGH: 1
ABDOMINAL PAIN: 0
NAUSEA: 0
EYE PAIN: 0
SORE THROAT: 1
SINUS PAIN: 0
VOMITING: 0

## 2020-06-29 ASSESSMENT — PAIN DESCRIPTION - ORIENTATION: ORIENTATION: RIGHT

## 2020-06-29 ASSESSMENT — PAIN DESCRIPTION - LOCATION: LOCATION: TOE (COMMENT WHICH ONE)

## 2020-06-29 ASSESSMENT — PAIN SCALES - GENERAL: PAINLEVEL_OUTOF10: 5

## 2020-06-29 NOTE — ED NOTES
Pt seen for an open area to her right second toe. Pt has been seen for this recently. Pt is on ATB and has been doing wound care. Pt also states she has a scratchy throat. Toe dressed. Pt given scripts for atb and allergy med. Discharge instructions discussed.       Taylor Conroy RN  06/29/20 6380

## 2020-06-29 NOTE — ED NOTES
Bed: 17B-17  Expected date:   Expected time:   Means of arrival:   Comments:  triage     [address] [facility] 121, RN  06/29/20 5709

## 2020-06-29 NOTE — ED PROVIDER NOTES
Kisha Morales is a 49-year-old female who presents with a chief complaint of toe pain. History comes primarily from the patient and the patient's medical record. She has been seen several times for the same complaint in the last month. Most recently 2 days ago, she was seen for this at which time she was prescribed antibiotics and images were taken of her foot. Outpatient follow-up with her family care practitioner was scheduled for today in the afternoon. On today's assessment, she states that she ambulated for a long time to get groceries, and that afterwards her foot was swollen and the area was tender. She also states that she has a slight tickle in the back of her throat that occasionally makes her cough. For this reason she felt it appropriate to present to [facility] emergency department at 5 in the morning. On arrival, she was assessed with history, physical exam, vital signs. Her vital signs are stable on arrival and she was afebrile. Review of Systems   Constitutional: Negative for appetite change, chills and fever. HENT: Positive for sore throat. Negative for sinus pain. Eyes: Negative for pain and visual disturbance. Respiratory: Positive for cough. Negative for choking, chest tightness, shortness of breath and wheezing. Cardiovascular: Negative for chest pain and palpitations. Gastrointestinal: Negative for abdominal pain, diarrhea, nausea and vomiting. Genitourinary: Negative for hematuria. Musculoskeletal: Positive for arthralgias (Toe pain). Negative for neck pain and neck stiffness. Skin: Negative for rash. Neurological: Negative for tremors, syncope and weakness. Psychiatric/Behavioral: Negative for confusion. Physical Exam  Constitutional:       General: She is not in acute distress. Appearance: She is well-developed. She is not diaphoretic. HENT:      Head: Normocephalic and atraumatic.       Right Ear: External ear normal. Left Ear: External ear normal.      Nose: Nose normal.      Mouth/Throat:      Pharynx: No oropharyngeal exudate. Comments: No erythema of the oropharynx. Very mild cobblestoning consistent with seasonal allergies  Eyes:      General:         Right eye: No discharge. Left eye: No discharge. Pupils: Pupils are equal, round, and reactive to light. Neck:      Musculoskeletal: Normal range of motion. Thyroid: No thyromegaly. Vascular: No JVD. Trachea: No tracheal deviation. Cardiovascular:      Rate and Rhythm: Normal rate and regular rhythm. Heart sounds: Normal heart sounds. No murmur. No friction rub. No gallop. Pulmonary:      Effort: No respiratory distress. Breath sounds: No stridor. No wheezing or rales. Abdominal:      General: There is no distension. Tenderness: There is no abdominal tenderness. Musculoskeletal:         General: No deformity. Lymphadenopathy:      Cervical: No cervical adenopathy. Skin:     General: Skin is warm and dry. Comments: Area of blistering over the lateral aspect of her right second toe appears to be improved as compared to imaging from previous. Neurological:      Cranial Nerves: No cranial nerve deficit. Procedures     MDM  Number of Diagnoses or Management Options  Seasonal allergies:   Ulcer of toe, unspecified laterality, unspecified ulcer stage [facility]):   Diagnosis management comments: The patient's emergency department work-up including history, physical exam, vital signs, laboratory studies, imaging studies and reevaluation after initial treatment are reassuring for discharge to home with close outpatient follow-up. Specifically, Odell Peacock has evidence of interval improvement in the area in question since last evaluation. She has good antibiotic coverage, and has scheduled outpatient follow-up already established with the primary care provider for this afternoon.   She was advised to rest the area in question so as not to cause persistent friction ulceration and desquamation of the associated wound. She will also be given a second generation antihistamine for control of her seasonal allergies, as she states that she has not taken any such medication for her symptoms to this point. They were advised to return to the emergency department should they develop fever, chills, night sweats, nausea, vomiting, diarrhea, chest pain, shortness of breath, or worsening of their symptoms despite treatment from this emergency department visit. They were instructed to follow-up with their primary care provider in 2 days. This information was relayed to the patient who understood this plan of care and was amenable to the plan.               --------------------------------------------- PAST HISTORY ---------------------------------------------  Past Medical History:  has a past medical history of Diabetes mellitus ([facility] Past Surgical History:  has a past surgical history that includes Cholecystectomy and  section. Social History:  reports that she is a non-smoker but has been exposed to tobacco smoke. She has never used smokeless tobacco. She reports that she does not drink alcohol or use drugs. Family History: family history includes Heart Attack in her father. The patients home medications have been reviewed. Allergies: Rocephin [ceftriaxone]    -------------------------------------------------- RESULTS -------------------------------------------------  Labs:  No results found for this visit on 20. Radiology:  No orders to display       ------------------------- NURSING NOTES AND VITALS REVIEWED ---------------------------  Date / Time Roomed:  2020  4:56 AM  ED Bed Assignment:      The nursing notes within the ED encounter and vital signs as below have been reviewed.    /82   Pulse 92   Temp 97.4 °F (36.3 °C)   Resp 17   Ht 5' 2\" (1.575 m)   Wt 225 lb (102.1 kg)   SpO2 94%   BMI 41.15 kg/m²   Oxygen Saturation Interpretation: Normal      ------------------------------------------ PROGRESS NOTES ------------------------------------------  5:34 AM EDT  I have spoken with the patient and discussed todays results, in addition to providing specific details for the plan of care and counseling regarding the diagnosis and prognosis. Their questions are answered at this time and they are agreeable with the plan. I discussed at length with them reasons for immediate return here for re evaluation. They will followup with their primary care provider today as previously scheduled. --------------------------------- ADDITIONAL PROVIDER NOTES ---------------------------------  At this time the patient is without objective evidence of an acute process requiring hospitalization or inpatient management. They have remained hemodynamically stable throughout their entire ED visit and are stable for discharge with outpatient follow-up. The plan has been discussed in detail and they are aware of the specific conditions for emergent return, as well as the importance of follow-up. New Prescriptions    CETIRIZINE (ZYRTEC ALLERGY) 10 MG TABLET    Take 1 tablet by mouth daily    DOXYCYCLINE HYCLATE (VIBRA-TABS) 100 MG TABLET    Take 1 tablet by mouth 2 times daily for 7 days       Diagnosis:  1. Ulcer of toe, unspecified laterality, unspecified ulcer stage ([facility]    2. Seasonal allergies        Disposition:  Patient's disposition: Discharge to home  Patient's condition is stable. Joesph [facility]anda [facility]  PGY-1  5:34 AM EDT           Flores Cruz DO  Resident  06/29/20 0827

## 2020-06-30 ENCOUNTER — CARE COORDINATION (OUTPATIENT)
Dept: CARE COORDINATION | Age: 35
End: 2020-06-30

## 2020-06-30 NOTE — CARE COORDINATION
Unable to leave First (tried 3 times, line immediately busy) message for patient to return call re: ED Follow-up for Plan to offer Loop Enrollment

## 2020-07-02 ENCOUNTER — TELEPHONE (OUTPATIENT)
Dept: CARDIOLOGY | Age: 35
End: 2020-07-02

## 2020-07-05 ENCOUNTER — APPOINTMENT (OUTPATIENT)
Dept: GENERAL RADIOLOGY | Age: 35
DRG: 314 | End: 2020-07-05
Payer: COMMERCIAL

## 2020-07-05 ENCOUNTER — APPOINTMENT (OUTPATIENT)
Dept: CT IMAGING | Age: 35
DRG: 314 | End: 2020-07-05
Payer: COMMERCIAL

## 2020-07-05 ENCOUNTER — HOSPITAL ENCOUNTER (INPATIENT)
Age: 35
LOS: 7 days | Discharge: HOME HEALTH | DRG: 314 | End: 2020-07-13
Attending: EMERGENCY MEDICINE | Admitting: INTERNAL MEDICINE
Payer: COMMERCIAL

## 2020-07-05 PROBLEM — R73.9 HYPERGLYCEMIA: Status: ACTIVE | Noted: 2020-07-05

## 2020-07-05 LAB
ALBUMIN SERPL-MCNC: 3.1 G/DL (ref 3.5–5.2)
ALBUMIN SERPL-MCNC: 3.2 G/DL (ref 3.5–5.2)
ALP BLD-CCNC: 132 U/L (ref 35–104)
ALP BLD-CCNC: 147 U/L (ref 35–104)
ALT SERPL-CCNC: 15 U/L (ref 0–32)
ALT SERPL-CCNC: 18 U/L (ref 0–32)
ANION GAP SERPL CALCULATED.3IONS-SCNC: 14 MMOL/L (ref 7–16)
ANION GAP SERPL CALCULATED.3IONS-SCNC: 14 MMOL/L (ref 7–16)
AST SERPL-CCNC: 16 U/L (ref 0–31)
AST SERPL-CCNC: 24 U/L (ref 0–31)
BACTERIA: ABNORMAL /HPF
BASOPHILS ABSOLUTE: 0.06 E9/L (ref 0–0.2)
BASOPHILS ABSOLUTE: 0.09 E9/L (ref 0–0.2)
BASOPHILS RELATIVE PERCENT: 0.6 % (ref 0–2)
BASOPHILS RELATIVE PERCENT: 0.8 % (ref 0–2)
BETA-HYDROXYBUTYRATE: 1.72 MMOL/L (ref 0.02–0.27)
BILIRUB SERPL-MCNC: 0.5 MG/DL (ref 0–1.2)
BILIRUB SERPL-MCNC: 0.7 MG/DL (ref 0–1.2)
BILIRUBIN URINE: NEGATIVE
BLOOD, URINE: ABNORMAL
BUN BLDV-MCNC: 8 MG/DL (ref 6–20)
BUN BLDV-MCNC: 9 MG/DL (ref 6–20)
C-REACTIVE PROTEIN: 7.7 MG/DL (ref 0–0.4)
CALCIUM SERPL-MCNC: 8.7 MG/DL (ref 8.6–10.2)
CALCIUM SERPL-MCNC: 9.3 MG/DL (ref 8.6–10.2)
CHLORIDE BLD-SCNC: 94 MMOL/L (ref 98–107)
CHLORIDE BLD-SCNC: 96 MMOL/L (ref 98–107)
CHP ED QC CHECK: YES
CK MB: <1 NG/ML (ref 0–4.3)
CLARITY: CLEAR
CO2: 23 MMOL/L (ref 22–29)
CO2: 23 MMOL/L (ref 22–29)
COLOR: YELLOW
CREAT SERPL-MCNC: 1 MG/DL (ref 0.5–1)
CREAT SERPL-MCNC: 1 MG/DL (ref 0.5–1)
EOSINOPHILS ABSOLUTE: 0.18 E9/L (ref 0.05–0.5)
EOSINOPHILS ABSOLUTE: 0.21 E9/L (ref 0.05–0.5)
EOSINOPHILS RELATIVE PERCENT: 1.7 % (ref 0–6)
EOSINOPHILS RELATIVE PERCENT: 1.8 % (ref 0–6)
EPITHELIAL CELLS, UA: ABNORMAL /HPF
GFR AFRICAN AMERICAN: >60
GFR AFRICAN AMERICAN: >60
GFR NON-AFRICAN AMERICAN: >60 ML/MIN/1.73
GFR NON-AFRICAN AMERICAN: >60 ML/MIN/1.73
GLUCOSE BLD-MCNC: 380 MG/DL (ref 74–99)
GLUCOSE BLD-MCNC: 395 MG/DL (ref 74–99)
GLUCOSE BLD-MCNC: 410 MG/DL
GLUCOSE URINE: >=1000 MG/DL
HBA1C MFR BLD: 10.1 % (ref 4–5.6)
HCG QUALITATIVE: NEGATIVE
HCT VFR BLD CALC: 34.7 % (ref 34–48)
HCT VFR BLD CALC: 38.3 % (ref 34–48)
HEMOGLOBIN: 11.4 G/DL (ref 11.5–15.5)
HEMOGLOBIN: 12.6 G/DL (ref 11.5–15.5)
IMMATURE GRANULOCYTES #: 0.05 E9/L
IMMATURE GRANULOCYTES #: 0.05 E9/L
IMMATURE GRANULOCYTES %: 0.4 % (ref 0–5)
IMMATURE GRANULOCYTES %: 0.5 % (ref 0–5)
KETONES, URINE: >=80 MG/DL
LACTIC ACID: 1.1 MMOL/L (ref 0.5–2.2)
LEUKOCYTE ESTERASE, URINE: ABNORMAL
LIPASE: 16 U/L (ref 13–60)
LYMPHOCYTES ABSOLUTE: 1.99 E9/L (ref 1.5–4)
LYMPHOCYTES ABSOLUTE: 2.51 E9/L (ref 1.5–4)
LYMPHOCYTES RELATIVE PERCENT: 19.2 % (ref 20–42)
LYMPHOCYTES RELATIVE PERCENT: 21.5 % (ref 20–42)
MAGNESIUM: 2 MG/DL (ref 1.6–2.6)
MCH RBC QN AUTO: 26.5 PG (ref 26–35)
MCH RBC QN AUTO: 26.6 PG (ref 26–35)
MCHC RBC AUTO-ENTMCNC: 32.9 % (ref 32–34.5)
MCHC RBC AUTO-ENTMCNC: 32.9 % (ref 32–34.5)
MCV RBC AUTO: 80.7 FL (ref 80–99.9)
MCV RBC AUTO: 81 FL (ref 80–99.9)
METER GLUCOSE: 272 MG/DL (ref 74–99)
METER GLUCOSE: 351 MG/DL (ref 74–99)
METER GLUCOSE: 373 MG/DL (ref 74–99)
METER GLUCOSE: 410 MG/DL (ref 74–99)
METER GLUCOSE: 415 MG/DL (ref 74–99)
MONOCYTES ABSOLUTE: 0.56 E9/L (ref 0.1–0.95)
MONOCYTES ABSOLUTE: 0.7 E9/L (ref 0.1–0.95)
MONOCYTES RELATIVE PERCENT: 5.4 % (ref 2–12)
MONOCYTES RELATIVE PERCENT: 6 % (ref 2–12)
NEUTROPHILS ABSOLUTE: 7.54 E9/L (ref 1.8–7.3)
NEUTROPHILS ABSOLUTE: 8.11 E9/L (ref 1.8–7.3)
NEUTROPHILS RELATIVE PERCENT: 69.5 % (ref 43–80)
NEUTROPHILS RELATIVE PERCENT: 72.6 % (ref 43–80)
NITRITE, URINE: NEGATIVE
PDW BLD-RTO: 13.1 FL (ref 11.5–15)
PDW BLD-RTO: 13.2 FL (ref 11.5–15)
PH UA: 5.5 (ref 5–9)
PH VENOUS: 7.35 (ref 7.35–7.45)
PHOSPHORUS: 3.5 MG/DL (ref 2.5–4.5)
PLATELET # BLD: 327 E9/L (ref 130–450)
PLATELET # BLD: 354 E9/L (ref 130–450)
PMV BLD AUTO: 11 FL (ref 7–12)
PMV BLD AUTO: 11.2 FL (ref 7–12)
POTASSIUM REFLEX MAGNESIUM: 4.1 MMOL/L (ref 3.5–5)
POTASSIUM REFLEX MAGNESIUM: 4.7 MMOL/L (ref 3.5–5)
PROTEIN UA: NEGATIVE MG/DL
RBC # BLD: 4.3 E12/L (ref 3.5–5.5)
RBC # BLD: 4.73 E12/L (ref 3.5–5.5)
RBC UA: ABNORMAL /HPF (ref 0–2)
SEDIMENTATION RATE, ERYTHROCYTE: 96 MM/HR (ref 0–20)
SODIUM BLD-SCNC: 131 MMOL/L (ref 132–146)
SODIUM BLD-SCNC: 133 MMOL/L (ref 132–146)
SPECIFIC GRAVITY UA: <=1.005 (ref 1–1.03)
TOTAL PROTEIN: 7.4 G/DL (ref 6.4–8.3)
TOTAL PROTEIN: 8.1 G/DL (ref 6.4–8.3)
TROPONIN: <0.01 NG/ML (ref 0–0.03)
TROPONIN: <0.01 NG/ML (ref 0–0.03)
UROBILINOGEN, URINE: 0.2 E.U./DL
WBC # BLD: 10.4 E9/L (ref 4.5–11.5)
WBC # BLD: 11.7 E9/L (ref 4.5–11.5)
WBC UA: ABNORMAL /HPF (ref 0–5)

## 2020-07-05 PROCEDURE — 86140 C-REACTIVE PROTEIN: CPT

## 2020-07-05 PROCEDURE — 96372 THER/PROPH/DIAG INJ SC/IM: CPT

## 2020-07-05 PROCEDURE — G0378 HOSPITAL OBSERVATION PER HR: HCPCS

## 2020-07-05 PROCEDURE — 84100 ASSAY OF PHOSPHORUS: CPT

## 2020-07-05 PROCEDURE — 71045 X-RAY EXAM CHEST 1 VIEW: CPT

## 2020-07-05 PROCEDURE — 36415 COLL VENOUS BLD VENIPUNCTURE: CPT

## 2020-07-05 PROCEDURE — 84703 CHORIONIC GONADOTROPIN ASSAY: CPT

## 2020-07-05 PROCEDURE — 83735 ASSAY OF MAGNESIUM: CPT

## 2020-07-05 PROCEDURE — 6370000000 HC RX 637 (ALT 250 FOR IP): Performed by: STUDENT IN AN ORGANIZED HEALTH CARE EDUCATION/TRAINING PROGRAM

## 2020-07-05 PROCEDURE — 87088 URINE BACTERIA CULTURE: CPT

## 2020-07-05 PROCEDURE — 96361 HYDRATE IV INFUSION ADD-ON: CPT

## 2020-07-05 PROCEDURE — 99285 EMERGENCY DEPT VISIT HI MDM: CPT

## 2020-07-05 PROCEDURE — 80053 COMPREHEN METABOLIC PANEL: CPT

## 2020-07-05 PROCEDURE — 82010 KETONE BODYS QUAN: CPT

## 2020-07-05 PROCEDURE — 82553 CREATINE MB FRACTION: CPT

## 2020-07-05 PROCEDURE — 82800 BLOOD PH: CPT

## 2020-07-05 PROCEDURE — 85651 RBC SED RATE NONAUTOMATED: CPT

## 2020-07-05 PROCEDURE — 2580000003 HC RX 258: Performed by: RADIOLOGY

## 2020-07-05 PROCEDURE — 2580000003 HC RX 258: Performed by: STUDENT IN AN ORGANIZED HEALTH CARE EDUCATION/TRAINING PROGRAM

## 2020-07-05 PROCEDURE — 85025 COMPLETE CBC W/AUTO DIFF WBC: CPT

## 2020-07-05 PROCEDURE — 83036 HEMOGLOBIN GLYCOSYLATED A1C: CPT

## 2020-07-05 PROCEDURE — 96374 THER/PROPH/DIAG INJ IV PUSH: CPT

## 2020-07-05 PROCEDURE — 83690 ASSAY OF LIPASE: CPT

## 2020-07-05 PROCEDURE — 6360000002 HC RX W HCPCS: Performed by: STUDENT IN AN ORGANIZED HEALTH CARE EDUCATION/TRAINING PROGRAM

## 2020-07-05 PROCEDURE — 74177 CT ABD & PELVIS W/CONTRAST: CPT

## 2020-07-05 PROCEDURE — 82962 GLUCOSE BLOOD TEST: CPT

## 2020-07-05 PROCEDURE — 73630 X-RAY EXAM OF FOOT: CPT

## 2020-07-05 PROCEDURE — 81001 URINALYSIS AUTO W/SCOPE: CPT

## 2020-07-05 PROCEDURE — 6360000004 HC RX CONTRAST MEDICATION: Performed by: RADIOLOGY

## 2020-07-05 PROCEDURE — 84484 ASSAY OF TROPONIN QUANT: CPT

## 2020-07-05 PROCEDURE — 96375 TX/PRO/DX INJ NEW DRUG ADDON: CPT

## 2020-07-05 PROCEDURE — 83605 ASSAY OF LACTIC ACID: CPT

## 2020-07-05 PROCEDURE — 87040 BLOOD CULTURE FOR BACTERIA: CPT

## 2020-07-05 RX ORDER — SODIUM CHLORIDE 9 MG/ML
INJECTION, SOLUTION INTRAVENOUS CONTINUOUS
Status: ACTIVE | OUTPATIENT
Start: 2020-07-05 | End: 2020-07-05

## 2020-07-05 RX ORDER — PROMETHAZINE HYDROCHLORIDE 25 MG/1
12.5 TABLET ORAL EVERY 6 HOURS PRN
Status: DISCONTINUED | OUTPATIENT
Start: 2020-07-05 | End: 2020-07-13 | Stop reason: HOSPADM

## 2020-07-05 RX ORDER — ONDANSETRON 2 MG/ML
4 INJECTION INTRAMUSCULAR; INTRAVENOUS EVERY 6 HOURS PRN
Status: DISCONTINUED | OUTPATIENT
Start: 2020-07-05 | End: 2020-07-13 | Stop reason: HOSPADM

## 2020-07-05 RX ORDER — PROMETHAZINE HYDROCHLORIDE 25 MG/ML
12.5 INJECTION, SOLUTION INTRAMUSCULAR; INTRAVENOUS ONCE
Status: COMPLETED | OUTPATIENT
Start: 2020-07-05 | End: 2020-07-05

## 2020-07-05 RX ORDER — MAGNESIUM SULFATE 1 G/100ML
1 INJECTION INTRAVENOUS PRN
Status: DISCONTINUED | OUTPATIENT
Start: 2020-07-05 | End: 2020-07-05

## 2020-07-05 RX ORDER — KETOROLAC TROMETHAMINE 30 MG/ML
15 INJECTION, SOLUTION INTRAMUSCULAR; INTRAVENOUS ONCE
Status: COMPLETED | OUTPATIENT
Start: 2020-07-05 | End: 2020-07-05

## 2020-07-05 RX ORDER — 0.9 % SODIUM CHLORIDE 0.9 %
1000 INTRAVENOUS SOLUTION INTRAVENOUS ONCE
Status: COMPLETED | OUTPATIENT
Start: 2020-07-05 | End: 2020-07-05

## 2020-07-05 RX ORDER — 0.9 % SODIUM CHLORIDE 0.9 %
15 INTRAVENOUS SOLUTION INTRAVENOUS ONCE
Status: DISCONTINUED | OUTPATIENT
Start: 2020-07-05 | End: 2020-07-05

## 2020-07-05 RX ORDER — POTASSIUM CHLORIDE 7.45 MG/ML
10 INJECTION INTRAVENOUS PRN
Status: DISCONTINUED | OUTPATIENT
Start: 2020-07-05 | End: 2020-07-05

## 2020-07-05 RX ORDER — NICOTINE POLACRILEX 4 MG
15 LOZENGE BUCCAL PRN
Status: DISCONTINUED | OUTPATIENT
Start: 2020-07-05 | End: 2020-07-13 | Stop reason: HOSPADM

## 2020-07-05 RX ORDER — ONDANSETRON 2 MG/ML
4 INJECTION INTRAMUSCULAR; INTRAVENOUS ONCE
Status: COMPLETED | OUTPATIENT
Start: 2020-07-05 | End: 2020-07-05

## 2020-07-05 RX ORDER — SODIUM CHLORIDE 0.9 % (FLUSH) 0.9 %
10 SYRINGE (ML) INJECTION PRN
Status: DISCONTINUED | OUTPATIENT
Start: 2020-07-05 | End: 2020-07-13 | Stop reason: HOSPADM

## 2020-07-05 RX ORDER — SODIUM CHLORIDE 9 MG/ML
INJECTION, SOLUTION INTRAVENOUS CONTINUOUS
Status: DISCONTINUED | OUTPATIENT
Start: 2020-07-05 | End: 2020-07-05

## 2020-07-05 RX ORDER — SODIUM CHLORIDE 0.9 % (FLUSH) 0.9 %
10 SYRINGE (ML) INJECTION
Status: COMPLETED | OUTPATIENT
Start: 2020-07-05 | End: 2020-07-05

## 2020-07-05 RX ORDER — POLYETHYLENE GLYCOL 3350 17 G/17G
17 POWDER, FOR SOLUTION ORAL DAILY PRN
Status: DISCONTINUED | OUTPATIENT
Start: 2020-07-05 | End: 2020-07-13 | Stop reason: HOSPADM

## 2020-07-05 RX ORDER — 0.9 % SODIUM CHLORIDE 0.9 %
500 INTRAVENOUS SOLUTION INTRAVENOUS ONCE
Status: DISCONTINUED | OUTPATIENT
Start: 2020-07-05 | End: 2020-07-13 | Stop reason: HOSPADM

## 2020-07-05 RX ORDER — ACETAMINOPHEN 325 MG/1
650 TABLET ORAL EVERY 6 HOURS PRN
Status: DISCONTINUED | OUTPATIENT
Start: 2020-07-05 | End: 2020-07-13 | Stop reason: HOSPADM

## 2020-07-05 RX ORDER — DEXTROSE MONOHYDRATE 25 G/50ML
12.5 INJECTION, SOLUTION INTRAVENOUS PRN
Status: DISCONTINUED | OUTPATIENT
Start: 2020-07-05 | End: 2020-07-13 | Stop reason: HOSPADM

## 2020-07-05 RX ORDER — DEXTROSE MONOHYDRATE 50 MG/ML
100 INJECTION, SOLUTION INTRAVENOUS PRN
Status: DISCONTINUED | OUTPATIENT
Start: 2020-07-05 | End: 2020-07-13 | Stop reason: HOSPADM

## 2020-07-05 RX ORDER — INSULIN GLARGINE 100 [IU]/ML
25 INJECTION, SOLUTION SUBCUTANEOUS NIGHTLY
Status: DISCONTINUED | OUTPATIENT
Start: 2020-07-05 | End: 2020-07-07

## 2020-07-05 RX ORDER — ACETAMINOPHEN 650 MG/1
650 SUPPOSITORY RECTAL EVERY 6 HOURS PRN
Status: DISCONTINUED | OUTPATIENT
Start: 2020-07-05 | End: 2020-07-13 | Stop reason: HOSPADM

## 2020-07-05 RX ORDER — DEXTROSE, SODIUM CHLORIDE, AND POTASSIUM CHLORIDE 5; .45; .15 G/100ML; G/100ML; G/100ML
INJECTION INTRAVENOUS CONTINUOUS PRN
Status: DISCONTINUED | OUTPATIENT
Start: 2020-07-05 | End: 2020-07-05

## 2020-07-05 RX ORDER — DEXTROSE MONOHYDRATE 25 G/50ML
12.5 INJECTION, SOLUTION INTRAVENOUS PRN
Status: DISCONTINUED | OUTPATIENT
Start: 2020-07-05 | End: 2020-07-05

## 2020-07-05 RX ORDER — SODIUM CHLORIDE 0.9 % (FLUSH) 0.9 %
10 SYRINGE (ML) INJECTION EVERY 12 HOURS SCHEDULED
Status: DISCONTINUED | OUTPATIENT
Start: 2020-07-05 | End: 2020-07-13 | Stop reason: HOSPADM

## 2020-07-05 RX ADMIN — PROMETHAZINE HYDROCHLORIDE 12.5 MG: 25 TABLET ORAL at 19:13

## 2020-07-05 RX ADMIN — SODIUM CHLORIDE 1000 ML: 9 INJECTION, SOLUTION INTRAVENOUS at 07:24

## 2020-07-05 RX ADMIN — KETOROLAC TROMETHAMINE 15 MG: 30 INJECTION, SOLUTION INTRAMUSCULAR at 12:26

## 2020-07-05 RX ADMIN — PROMETHAZINE HYDROCHLORIDE 12.5 MG: 25 INJECTION INTRAMUSCULAR; INTRAVENOUS at 12:34

## 2020-07-05 RX ADMIN — SODIUM CHLORIDE 1000 ML: 9 INJECTION, SOLUTION INTRAVENOUS at 12:26

## 2020-07-05 RX ADMIN — INSULIN HUMAN 10 UNITS: 100 INJECTION, SOLUTION PARENTERAL at 17:33

## 2020-07-05 RX ADMIN — IOPAMIDOL 110 ML: 755 INJECTION, SOLUTION INTRAVENOUS at 10:06

## 2020-07-05 RX ADMIN — LIDOCAINE HYDROCHLORIDE: 20 SOLUTION ORAL; TOPICAL at 07:24

## 2020-07-05 RX ADMIN — INSULIN GLARGINE 25 UNITS: 100 INJECTION, SOLUTION SUBCUTANEOUS at 20:43

## 2020-07-05 RX ADMIN — ONDANSETRON HYDROCHLORIDE 4 MG: 2 SOLUTION INTRAMUSCULAR; INTRAVENOUS at 07:24

## 2020-07-05 RX ADMIN — INSULIN LISPRO 2 UNITS: 100 INJECTION, SOLUTION INTRAVENOUS; SUBCUTANEOUS at 20:43

## 2020-07-05 RX ADMIN — Medication 10 ML: at 10:06

## 2020-07-05 RX ADMIN — INSULIN HUMAN 10 UNITS: 100 INJECTION, SOLUTION PARENTERAL at 13:04

## 2020-07-05 RX ADMIN — INSULIN LISPRO 6 UNITS: 100 INJECTION, SOLUTION INTRAVENOUS; SUBCUTANEOUS at 17:33

## 2020-07-05 ASSESSMENT — PAIN SCALES - GENERAL
PAINLEVEL_OUTOF10: 6
PAINLEVEL_OUTOF10: 6
PAINLEVEL_OUTOF10: 8

## 2020-07-05 ASSESSMENT — PAIN DESCRIPTION - LOCATION
LOCATION: ABDOMEN
LOCATION: ABDOMEN

## 2020-07-05 ASSESSMENT — ENCOUNTER SYMPTOMS
ABDOMINAL PAIN: 1
RHINORRHEA: 0
DIARRHEA: 0
SHORTNESS OF BREATH: 0
NAUSEA: 1
SORE THROAT: 0
COUGH: 0
BACK PAIN: 0
VOMITING: 0

## 2020-07-05 ASSESSMENT — PAIN DESCRIPTION - DESCRIPTORS
DESCRIPTORS: DISCOMFORT
DESCRIPTORS: DISCOMFORT

## 2020-07-05 ASSESSMENT — PAIN DESCRIPTION - PAIN TYPE
TYPE: ACUTE PAIN
TYPE: ACUTE PAIN

## 2020-07-05 ASSESSMENT — PAIN DESCRIPTION - ORIENTATION
ORIENTATION: RIGHT;UPPER
ORIENTATION: RIGHT;UPPER

## 2020-07-05 NOTE — ED PROVIDER NOTES
Miguel Mayo is a 37 y.o. female with a PMHx significant for DM, foot drop (R), polyneuropathy (secondary to diabetes) who presents for evaluation of abdominal pain and dizziness, beginning prior to arrival.  The complaint has been persistent, moderate in severity, and worsened by nothing. The patient states that yesterday she started to feel nauseated with abdominal pain. The nausea came first and she took bentyl and omeprazole. She had one episode of non-bloody emesis. She notes her abdominal pain is \"constant\" and is the LUQ. She notes she has had similar pain in the past, is not sure what it was, but it was not as bad as it is today. Patient states that she started to feel light headed and weak as if she was going to pass out. The history is provided by the patient and medical records. Review of Systems   Constitutional: Negative for chills, diaphoresis and fever. HENT: Negative for congestion, rhinorrhea and sore throat. Eyes: Negative for visual disturbance. Respiratory: Negative for cough and shortness of breath. Cardiovascular: Negative for chest pain. Gastrointestinal: Positive for abdominal pain and nausea. Negative for diarrhea and vomiting. Genitourinary: Negative for dysuria and urgency. Musculoskeletal: Negative for back pain. Skin: Negative for rash. Neurological: Positive for light-headedness. Negative for dizziness, numbness and headaches. Physical Exam  Vitals signs and nursing note reviewed. Constitutional:       General: She is not in acute distress. Appearance: Normal appearance. She is well-developed. She is not ill-appearing. HENT:      Head: Normocephalic and atraumatic. Right Ear: External ear normal.      Left Ear: External ear normal.      Nose: Nose normal.   Eyes:      General:         Right eye: No discharge. Left eye: No discharge. Extraocular Movements: Extraocular movements intact. Conjunctiva/sclera: Conjunctivae normal.   Neck:      Musculoskeletal: Normal range of motion and neck supple. Cardiovascular:      Rate and Rhythm: Normal rate and regular rhythm. Heart sounds: Normal heart sounds. Pulmonary:      Effort: Pulmonary effort is normal.      Breath sounds: Normal breath sounds. Abdominal:      General: Bowel sounds are normal. There is no distension. Palpations: Abdomen is soft. There is no mass. Tenderness: There is abdominal tenderness in the left upper quadrant and left lower quadrant. Musculoskeletal: Normal range of motion. General: No swelling, tenderness or deformity. Skin:     General: Skin is warm. Coloration: Skin is not jaundiced. Findings: No rash. Neurological:      General: No focal deficit present. Mental Status: She is alert and oriented to person, place, and time. Cranial Nerves: No cranial nerve deficit. Coordination: Coordination normal.          Procedures     MDM     ED Course as of Jul 05 1356   Sun Jul 05, 2020   1054 Went to reevaluate the patient. She is lying in bed, states she continues to have some discomfort. Urine pending.    [BB]   1330 Spoke with Dr. Concetta Moreland discussed the patient. He will admit the patient. [BB]      ED Course User Index  [BB] DO Miguel Ulloa presents to the ED for evaluation of abdominal discomfort. Differential diagnoses included but not limited to gastritis, diverticulitis, UTI. Workup in the ED revealed hyperglycemia, concern for DKA, no anion gap, however elevated beta hydroxy. Patient was given fluids and multiple rounds of antiemetics in the ER without much relief.  Concern for mild DKA Patient requires continued workup and management of their symptoms and will be admitted to the hospital for further evaluation and treatment.      --------------------------------------------- PAST HISTORY ---------------------------------------------  Past Medical History:  has a past medical history of Diabetes mellitus ([facility] [address] Past Surgical History:  has a past surgical history that includes Cholecystectomy and  section. Social History:  reports that she is a non-smoker but has been exposed to tobacco smoke. She has never used smokeless tobacco. She reports that she does not drink alcohol or use drugs. Family History: family history includes Heart Attack in her father. The patients home medications have been reviewed.     Allergies: Rocephin [ceftriaxone]    -------------------------------------------------- RESULTS -------------------------------------------------    LABS:  Results for orders placed or performed during the hospital encounter of 20   Urinalysis   Result Value Ref Range    Color, UA Yellow Straw/Yellow    Clarity, UA Clear Clear    Glucose, Ur >=1000 (A) Negative mg/dL    Bilirubin Urine Negative Negative    Ketones, Urine >=80 (A) Negative mg/dL    Specific Gravity, UA <=1.005 1.005 - 1.030    Blood, Urine TRACE-INTACT Negative    pH, UA 5.5 5.0 - 9.0    Protein, UA Negative Negative mg/dL    Urobilinogen, Urine 0.2 <2.0 E.U./dL    Nitrite, Urine Negative Negative    Leukocyte Esterase, Urine TRACE (A) Negative   CBC Auto Differential   Result Value Ref Range    WBC 11.7 (H) 4.5 - 11.5 E9/L    RBC 4.73 3.50 - 5.50 E12/L    Hemoglobin 12.6 11.5 - 15.5 g/dL    Hematocrit 38.3 34.0 - 48.0 %    MCV 81.0 80.0 - 99.9 fL    MCH 26.6 26.0 - 35.0 pg    MCHC 32.9 32.0 - 34.5 %    RDW 13.2 11.5 - 15.0 fL    Platelets 258 911 - 571 E9/L    MPV 11.0 7.0 - 12.0 fL    Neutrophils % 69.5 43.0 - 80.0 %    Immature Granulocytes % 0.4 0.0 - 5.0 %    Lymphocytes % 21.5 20.0 - 42.0 %    Monocytes % 6.0 2.0 - 12.0 %    Eosinophils % 1.8 0.0 - 6.0 %    Basophils % 0.8 0.0 - 2.0 %    Neutrophils Absolute 8.11 (H) 1.80 - 7.30 E9/L    Immature Granulocytes # 0.05 E9/L    Lymphocytes Absolute 2.51 1.50 - 4.00 E9/L    Monocytes Absolute 0.70 0.10 - 0.95 E9/L    Eosinophils Absolute 0.21 0.05 - 0.50 E9/L    Basophils Absolute 0.09 0.00 - 0.20 E9/L   Comprehensive Metabolic Panel w/ Reflex to MG   Result Value Ref Range    Sodium 133 132 - 146 mmol/L    Potassium reflex Magnesium 4.7 3.5 - 5.0 mmol/L    Chloride 96 (L) 98 - 107 mmol/L    CO2 23 22 - 29 mmol/L    Anion Gap 14 7 - 16 mmol/L    Glucose 395 (H) 74 - 99 mg/dL    BUN 8 6 - 20 mg/dL    CREATININE 1.0 0.5 - 1.0 mg/dL    GFR Non-African American >60 >=60 mL/min/1.73    GFR African American >60     Calcium 9.3 8.6 - 10.2 mg/dL    Total Protein 8.1 6.4 - 8.3 g/dL    Alb 3.2 (L) 3.5 - 5.2 g/dL    Total Bilirubin 0.7 0.0 - 1.2 mg/dL    Alkaline Phosphatase 147 (H) 35 - 104 U/L    ALT 18 0 - 32 U/L    AST 24 0 - 31 U/L   Lipase   Result Value Ref Range    Lipase 16 13 - 60 U/L   Lactic Acid, Plasma   Result Value Ref Range    Lactic Acid 1.1 0.5 - 2.2 mmol/L   HCG, SERUM, QUALITATIVE   Result Value Ref Range    hCG Qual NEGATIVE NEGATIVE   Microscopic Urinalysis   Result Value Ref Range    WBC, UA 1-3 0 - 5 /HPF    RBC, UA 1-3 0 - 2 /HPF    Epithelial Cells, UA RARE /HPF    Bacteria, UA RARE (A) None Seen /HPF   PH, VENOUS   Result Value Ref Range    pH, Ed 7.35 7.35 - 7.45   Beta-Hydroxybutyrate   Result Value Ref Range    Beta-Hydroxybutyrate 1.72 (H) 0.02 - 0.27 mmol/L   POCT Glucose   Result Value Ref Range    Glucose 410 mg/dL    QC OK? yes    POCT Glucose   Result Value Ref Range    Meter Glucose 410 (H) 74 - 99 mg/dL       RADIOLOGY:  CT ABDOMEN PELVIS W IV CONTRAST Additional Contrast? None   Final Result         1. Prominently distended urinary bladder dome at the level of the   umbilicus. Please correlate clinically as to the need for   catheterization. 2. Prominence of the right inguinal lymph nodes. A reactive etiology   is favored. Clinical correlation is needed. 3. Mild hepatomegaly.                  ------------------------- NURSING NOTES AND VITALS REVIEWED ---------------------------  Date / Time Roomed:  7/5/2020  6:07 AM  ED Bed Assignment:  19/19    The nursing notes within the ED encounter and vital signs as below have been reviewed. Patient Vitals for the past 24 hrs:   BP Temp Pulse Resp SpO2 Height Weight   07/05/20 1307 90/61 97.6 °F (36.4 °C) 96 14 95 % -- --   07/05/20 0727 (!) 106/50 97.2 °F (36.2 °C) 94 16 94 % -- --   07/05/20 0611 107/70 97.5 °F (36.4 °C) 104 18 95 % 5' 2\" (1.575 m) 225 lb (102.1 kg)       Oxygen Saturation Interpretation: Normal    ------------------------------------------ PROGRESS NOTES ------------------------------------------  Counseling:  I have spoken with the patient and discussed todays results, in addition to providing specific details for the plan of care and counseling regarding the diagnosis and prognosis. Their questions are answered at this time and they are agreeable with the plan of admission.    --------------------------------- ADDITIONAL PROVIDER NOTES ---------------------------------  Consultations:  Spoke with Dr. Jorge Gallegos. Discussed case. They will admit the patient. This patient's ED course included: a personal history and physicial examination, re-evaluation prior to disposition, multiple bedside re-evaluations, IV medications, cardiac monitoring, continuous pulse oximetry and complex medical decision making and emergency management    This patient has remained hemodynamically stable during their ED course. Diagnosis:  1. Hyperglycemia    2. Non-intractable vomiting with nausea, unspecified vomiting type    3. Generalized abdominal pain        Disposition:  Patient's disposition: Admit to telemetry  Patient's condition is stable.            Ariel Johnson DO  Resident  07/05/20 7811

## 2020-07-05 NOTE — H&P
[facility]  Internal Medicine Residency Program  History and Physical    Patient:  Marcie Rivera 37 y.o. female MRN: [id number]     Date of Service: 7/5/2020    Hospital Day: 1      Chief complaint: had concerns including Abdominal Pain (RUQ pain, onset a couple of hours ago, with nausea. states had similar issue sometime in the alst couple months and was seen in the ER for it but states she was never told what it was) and Dizziness (onset at the same time as her abd pain). History of Present Illness   The patient is a 37 y.o. female with PMHx of DM I presenting with nausea and abdominal pain. Patient admits nausea and abdominal pain located in the LUQ starting yesterday. These symptoms persisted into today at which point was accompanied by non-bilious emesis x1. The patient admits her LUQ pain is constant and is present upon interview in the ED. She was recently seen for right toe pain on 06/27 & 06/29/2020. She has polyneuropathy and R foot drop with a wound on her right toe. Upon leaving the ED 06/29, she was instructed to take Doxycycline which the patient admits to taking as prescribed. She admits to no recent discharge, pain, or complaints to this toe since discharge with no recent fevers or chills. Joaquin Aaron admits to taking her insulin as prescribed except for today; patient did not take insulin today. Upon interview and examination patient admitted to feeling weak, nauseas, and dizzy. Patient denies chest pain, illicit drug use, alcohol use. The patient had a similar presentation in March 2020 with abdominal pain and diarrhea was a CAT scan remarkable for thickened enhancing mucosa with surrounding inflammation and induration in the loops of the jejunum. The patient was seen by gastroenterology at that time who suggested an irritable bowel syndrome. Most recent BP 90/61  Glucose 395.   Beta-Hydroxybutyrate 1.72   WBC 11.7  Glucosuria, Ketonuria     Patient given 1,000 mL bolus NS in the x2, Toradol, Zofran, and Phenergan in the ED. Past Medical History:      Diagnosis Date    Diabetes mellitus ([address]        Past Surgical History:        Procedure Laterality Date     SECTION      CHOLECYSTECTOMY         Medications Prior to Admission:    Prior to Admission medications    Medication Sig Start Date End Date Taking?  Authorizing Provider   doxycycline hyclate (VIBRA-TABS) 100 MG tablet Take 1 tablet by mouth 2 times daily for 7 days 20  Joesph Junior, DO   cetirizine (ZYRTEC ALLERGY) 10 MG tablet Take 1 tablet by mouth daily 20   Joesph Keterin, DO   sulfamethoxazole-trimethoprim (BACTRIM DS) 800-160 MG per tablet Take 2 tablets by mouth 2 times daily for 10 days 20  LAUREN Dexter CNP   dicyclomine (BENTYL) 10 MG capsule Take 2 capsules by mouth 4 times daily as needed (abdominal pain) 20   Edy Quintero DO   ondansetron (ZOFRAN ODT) 8 MG TBDP disintegrating tablet Place 1 tablet under the tongue every 8 hours as needed for Nausea 20   Edy Quintero DO   Liraglutide (VICTOZA) 18 MG/3ML SOPN SC injection Inject 0.6 mg into the skin daily    Historical Provider, MD   hydrocortisone (CORTEF) 5 MG tablet Take 1 tablet by mouth every evening 3/10/20   Cuba Young MD   insulin glargine [facility]) 100 UNIT/ML injection pen Inject 25 Units into the skin nightly 3/10/20   Cuba Young MD   insulin lispro (HUMALOG) 100 UNIT/ML injection vial Take 5 units of Humalog with eat meal  plus If blood sugars are 150-200 -add 1 units of Humalog If blood sugars are 201-250 - add 2 units of Humalog If blood sugars are 251-300 - add 3 units of Humalog If blood sugars are 301-350 - add 4 units of Humalog If  above 350 - add 5 unit of humalog 3/10/20   Anotinette Cobb MD   raNITIdine HCl (RANITIDINE 150 MAX STRENGTH PO) Take 300 mg by mouth daily    Historical Provider, MD   omeprazole (PRILOSEC) 40 MG delayed release capsule Take 1 capsule by mouth daily 5/2/17 4/22/20  Bernadette Goodson MD   GABAPENTIN PO Take 600 mg by mouth 2 times daily (with meals)     Historical Provider, MD       Allergies:  Rocephin [ceftriaxone]    Social History:   TOBACCO:   reports that she is a non-smoker but has been exposed to tobacco smoke. She has never used smokeless tobacco.  ETOH:   reports no history of alcohol use. OCCUPATION: No Occupation     Family History:       Problem Relation Age of Onset    Heart Attack Father        REVIEW OF SYSTEMS:    · Constitutional: No fever, no chills, no change in weight;  · HEENT: No blurred vision, no ear problems, no sore throat, no rhinorrhea. · Respiratory: No cough, no sputum production, no pleuritic chest pain, no shortness of breath  · Cardiology: No angina, no dyspnea on exertion, no paroxysmal nocturnal dyspnea, no orthopnea, no palpitation, no leg swelling. · Gastroenterology: LUQ abdominal pain, NB emesis x1, nauseous; no constipation or diarrhea.  No hematochezia   · Genitourinary: No dysuria, no frequency, hesitancy; no hematuria  · Musculoskeletal: no joint pain, no myalgia, no change in range of movement  · Neurology: no focal weakness in extremities, no slurred speech, no double vision, no tingling or numbness sensation  · Endocrinology: no temperature intolerance, no polyphagia, polydipsia or polyuria  · Hematology: no increased bleeding, no bruising, no lymphadenopathy  · Skin: no skin changes noticed by patient  · Psychology: no depressed mood, no suicidal ideation    Physical Exam   · Vitals: BP 90/61   Pulse 96   Temp 97.6 °F (36.4 °C)   Resp 14   Ht 5' 2\" (1.575 m)   Wt 225 lb (102.1 kg)   SpO2 95%   BMI 41.15 kg/m²     · General Appearance: alert and oriented to person, place and time, well developed and well- nourished, in no acute distress  · Skin: warm and dry, no rash or erythema  · Head: normocephalic and atraumatic  · Eyes: pupils equal, round, and reactive to light, extraocular eye movements intact, conjunctivae normal  · ENT: tympanic membrane, external ear and ear canal normal bilaterally, nose without deformity, nasal mucosa and turbinates normal without polyps  · Neck: supple and non-tender without mass, no thyromegaly or thyroid nodules, no cervical lymphadenopathy  · Pulmonary/Chest: clear to auscultation bilaterally- no wheezes, rales or rhonchi, normal air movement, no respiratory distress  · Cardiovascular: normal rate, regular rhythm, normal S1 and S2, no murmurs, rubs, clicks, or gallops, distal pulses intact, no carotid bruits  · Abdomen: soft, non-tender, non-distended, normal bowel sounds, no masses or organomegaly  · Extremities: no cyanosis, clubbing or edema  · Musculoskeletal: normal range of motion, no joint swelling, deformity or tenderness  · Neurologic: reflexes normal and symmetric, no cranial nerve deficit, gait, coordination and speech normal   Labs and Imaging Studies   Basic Labs  Recent Labs     07/05/20  0647 07/05/20  1240     --    K 4.7  --    CL 96*  --    CO2 23  --    BUN 8  --    CREATININE 1.0  --    GLUCOSE 395* 410   CALCIUM 9.3  --        Recent Labs     07/05/20  0647   WBC 11.7*   RBC 4.73   HGB 12.6   HCT 38.3   MCV 81.0   MCH 26.6   MCHC 32.9   RDW 13.2      MPV 11.0       CBC:   Lab Results   Component Value Date    WBC 11.7 07/05/2020    RBC 4.73 07/05/2020    HGB 12.6 07/05/2020    HCT 38.3 07/05/2020    MCV 81.0 07/05/2020    RDW 13.2 07/05/2020     07/05/2020     CMP:  Lab Results   Component Value Date     07/05/2020    K 4.7 07/05/2020    CL 96 07/05/2020    CO2 23 07/05/2020    BUN 8 07/05/2020    PROT 8.1 07/05/2020     U/A:  No components found for: Chiara Meyers, USPGRAV, UPH, UPROTEIN, UGLUCOSE, UKETONE, UBILI, UBLOOD, Marty, [place], [place], [facility], [place], [facility], Jesu Andino    Imaging Studies:     Xr Foot Right (2 Views)    Result Date: 6/27/2020  Patient MRN: [id number] : 1985 Age: 37 years Gender: Female Order Date:  2020 5:00 PM EXAM: XR FOOT RIGHT (2 VIEWS) NUMBER OF IMAGES:  2 views INDICATION: Diabetic ulceration right second toe COMPARISON: Previous right foot studies May 2, 2020 and 2019 FINDINGS: Bony structures are intact with preservation of alignment and joint spacing. No acute skeletal pathology is noted plantar and Achilles traction spurs of the calcaneus are evident. Soft tissues about the second toe are prominent, and there is some decreased density in the webspace between the second and third toe, without well-defined gas. There is diffuse forefoot soft tissue swelling on the lateral projection on the dorsal and plantar aspect. Swelling and decreased density in the forefoot, with some prominence along the second toe and second-third webspace. Is nonspecific, and if further imaging is felt to BE clinically necessary, noncontrast MRI can identify marrow edema of osteomyelitis as well as localized soft tissue pathology such as abscess formation. Ct Abdomen Pelvis W Iv Contrast Additional Contrast? None    Result Date: 2020  Patient MRN:  [id number] : 1985 Age: 37 years Gender: Female Order Date:  2020 6:30 AM EXAM: CT ABDOMEN PELVIS W IV CONTRAST INDICATION:  Epigastric / LUQ abdominal pain Epigastric / LUQ abdominal pain  COMPARISON: CT of the abdomen and pelvis, 2020 Technique: Low-dose CT  acquisition technique included one of following options; 1 . Automated exposure control, 2. Adjustment of MA and or KV according to patient's size or 3. Use of iterative reconstruction. Multiple computerized tomography sections of the abdomen and pelvis with sagittal and coronal MPR reconstructions were obtained from the top of the diaphragm to the pelvis. FINDINGS: LOWER THORAX: Unremarkable. LIVER: Mildly enlarged. The abdomen is partially excluded from the field-of-view. No focal lesion or ductal dilatation. Ghada Quintero prophylaxis/ GI prophylaxis: Lovenox 40 mg daily/Zantac 300mg daily  Disposition: admit for observation     Nisha Atwood DO, PGY-1   Attending physician: Dr. Bettina Granda    Senior Resident Statement  I have seen and examined the patient with the intern. I have discussed the case, including pertinent history and exam findings with the intern. I agree with the assessment, plan and orders as documented by the intern. I have also discussed the plan with the attending on call, Dr. Bettina Granda    The patient is a 22-year-old female with a past medical history of diabetes who presented with abdominal pain nausea and vomiting. 1.  Hyperglycemia: Give another 10 units of regular insulin, resume home dose of Lantus (25 twice daily), and low-dose sliding scale. 2.  Diabetic foot ulcer: Healing, pictures in the media, trend vancomycin 15 mg/kg gram, pharmacy to dose. Wound care, and podiatry consult. 3.  Abdominal pain: Epigastric, left upper quadrant, and left lower quadrant, possibly irritable bowel, versus inflammatory bowel disease, order CRP and ESR. Might need further work-up as outpatient. Unremarkable CAT scan on admission. Ordered stool culture, calprotectin, and blood cultures. 4.  Adrenal insufficiency: Resume hydrocortisone, replacement dose, 10 in the morning and 10 at night. Remainder of medical problems as per intern note above.       Ryanne Lewis M.D., PGY 2    Attending physician: Dr. Selwyn Angelucci, MD

## 2020-07-05 NOTE — ED NOTES
Bed: 19  Expected date:   Expected time:   Means of arrival:   Comments:  ems     Agnes Jaquez RN  07/05/20 [id number]

## 2020-07-06 ENCOUNTER — APPOINTMENT (OUTPATIENT)
Dept: MRI IMAGING | Age: 35
DRG: 314 | End: 2020-07-06
Payer: COMMERCIAL

## 2020-07-06 ENCOUNTER — APPOINTMENT (OUTPATIENT)
Dept: INTERVENTIONAL RADIOLOGY/VASCULAR | Age: 35
DRG: 314 | End: 2020-07-06
Payer: COMMERCIAL

## 2020-07-06 ENCOUNTER — TELEPHONE (OUTPATIENT)
Dept: CARDIOLOGY | Age: 35
End: 2020-07-06

## 2020-07-06 LAB
ALBUMIN SERPL-MCNC: 3.6 G/DL (ref 3.5–5.2)
ALP BLD-CCNC: 142 U/L (ref 35–104)
ALT SERPL-CCNC: 15 U/L (ref 0–32)
ANION GAP SERPL CALCULATED.3IONS-SCNC: 14 MMOL/L (ref 7–16)
AST SERPL-CCNC: 19 U/L (ref 0–31)
BASOPHILS ABSOLUTE: 0.11 E9/L (ref 0–0.2)
BASOPHILS RELATIVE PERCENT: 0.9 % (ref 0–2)
BILIRUB SERPL-MCNC: 0.5 MG/DL (ref 0–1.2)
BUN BLDV-MCNC: 8 MG/DL (ref 6–20)
CALCIUM SERPL-MCNC: 9.1 MG/DL (ref 8.6–10.2)
CHLORIDE BLD-SCNC: 98 MMOL/L (ref 98–107)
CO2: 25 MMOL/L (ref 22–29)
CREAT SERPL-MCNC: 1 MG/DL (ref 0.5–1)
EOSINOPHILS ABSOLUTE: 0.25 E9/L (ref 0.05–0.5)
EOSINOPHILS RELATIVE PERCENT: 2 % (ref 0–6)
GFR AFRICAN AMERICAN: >60
GFR NON-AFRICAN AMERICAN: >60 ML/MIN/1.73
GLUCOSE BLD-MCNC: 167 MG/DL (ref 74–99)
HCT VFR BLD CALC: 36.7 % (ref 34–48)
HEMOGLOBIN: 12.1 G/DL (ref 11.5–15.5)
IMMATURE GRANULOCYTES #: 0.08 E9/L
IMMATURE GRANULOCYTES %: 0.6 % (ref 0–5)
LYMPHOCYTES ABSOLUTE: 1.94 E9/L (ref 1.5–4)
LYMPHOCYTES RELATIVE PERCENT: 15.4 % (ref 20–42)
MAGNESIUM: 2 MG/DL (ref 1.6–2.6)
MCH RBC QN AUTO: 26.5 PG (ref 26–35)
MCHC RBC AUTO-ENTMCNC: 33 % (ref 32–34.5)
MCV RBC AUTO: 80.3 FL (ref 80–99.9)
METER GLUCOSE: 163 MG/DL (ref 74–99)
METER GLUCOSE: 186 MG/DL (ref 74–99)
METER GLUCOSE: 220 MG/DL (ref 74–99)
METER GLUCOSE: 399 MG/DL (ref 74–99)
METER GLUCOSE: 91 MG/DL (ref 74–99)
MONOCYTES ABSOLUTE: 0.89 E9/L (ref 0.1–0.95)
MONOCYTES RELATIVE PERCENT: 7.1 % (ref 2–12)
NEUTROPHILS ABSOLUTE: 9.29 E9/L (ref 1.8–7.3)
NEUTROPHILS RELATIVE PERCENT: 74 % (ref 43–80)
PDW BLD-RTO: 13 FL (ref 11.5–15)
PHOSPHORUS: 3.7 MG/DL (ref 2.5–4.5)
PLATELET # BLD: 359 E9/L (ref 130–450)
PMV BLD AUTO: 11.5 FL (ref 7–12)
POTASSIUM REFLEX MAGNESIUM: 3.8 MMOL/L (ref 3.5–5)
RBC # BLD: 4.57 E12/L (ref 3.5–5.5)
SODIUM BLD-SCNC: 137 MMOL/L (ref 132–146)
TOTAL PROTEIN: 7.9 G/DL (ref 6.4–8.3)
URINE CULTURE, ROUTINE: NORMAL
WBC # BLD: 12.6 E9/L (ref 4.5–11.5)

## 2020-07-06 PROCEDURE — 2140000000 HC CCU INTERMEDIATE R&B

## 2020-07-06 PROCEDURE — 99223 1ST HOSP IP/OBS HIGH 75: CPT | Performed by: INTERNAL MEDICINE

## 2020-07-06 PROCEDURE — 93923 UPR/LXTR ART STDY 3+ LVLS: CPT

## 2020-07-06 PROCEDURE — 73718 MRI LOWER EXTREMITY W/O DYE: CPT

## 2020-07-06 PROCEDURE — 6370000000 HC RX 637 (ALT 250 FOR IP): Performed by: INTERNAL MEDICINE

## 2020-07-06 PROCEDURE — 6360000002 HC RX W HCPCS: Performed by: STUDENT IN AN ORGANIZED HEALTH CARE EDUCATION/TRAINING PROGRAM

## 2020-07-06 PROCEDURE — 80053 COMPREHEN METABOLIC PANEL: CPT

## 2020-07-06 PROCEDURE — 85025 COMPLETE CBC W/AUTO DIFF WBC: CPT

## 2020-07-06 PROCEDURE — 2580000003 HC RX 258: Performed by: INTERNAL MEDICINE

## 2020-07-06 PROCEDURE — 36415 COLL VENOUS BLD VENIPUNCTURE: CPT

## 2020-07-06 PROCEDURE — 6360000002 HC RX W HCPCS: Performed by: SPECIALIST

## 2020-07-06 PROCEDURE — 2580000003 HC RX 258: Performed by: SPECIALIST

## 2020-07-06 PROCEDURE — 93005 ELECTROCARDIOGRAM TRACING: CPT | Performed by: STUDENT IN AN ORGANIZED HEALTH CARE EDUCATION/TRAINING PROGRAM

## 2020-07-06 PROCEDURE — 83735 ASSAY OF MAGNESIUM: CPT

## 2020-07-06 PROCEDURE — 84100 ASSAY OF PHOSPHORUS: CPT

## 2020-07-06 PROCEDURE — 96372 THER/PROPH/DIAG INJ SC/IM: CPT

## 2020-07-06 PROCEDURE — 6370000000 HC RX 637 (ALT 250 FOR IP): Performed by: STUDENT IN AN ORGANIZED HEALTH CARE EDUCATION/TRAINING PROGRAM

## 2020-07-06 PROCEDURE — 82962 GLUCOSE BLOOD TEST: CPT

## 2020-07-06 PROCEDURE — 2580000003 HC RX 258: Performed by: STUDENT IN AN ORGANIZED HEALTH CARE EDUCATION/TRAINING PROGRAM

## 2020-07-06 RX ORDER — HYDROCORTISONE 20 MG/1
20 TABLET ORAL DAILY
Status: COMPLETED | OUTPATIENT
Start: 2020-07-06 | End: 2020-07-07

## 2020-07-06 RX ORDER — 0.9 % SODIUM CHLORIDE 0.9 %
1000 INTRAVENOUS SOLUTION INTRAVENOUS ONCE
Status: COMPLETED | OUTPATIENT
Start: 2020-07-06 | End: 2020-07-06

## 2020-07-06 RX ORDER — HYDROCORTISONE 5 MG/1
10 TABLET ORAL NIGHTLY
Status: DISCONTINUED | OUTPATIENT
Start: 2020-07-06 | End: 2020-07-07

## 2020-07-06 RX ORDER — HYDROCORTISONE 5 MG/1
5 TABLET ORAL NIGHTLY
Status: DISCONTINUED | OUTPATIENT
Start: 2020-07-08 | End: 2020-07-07

## 2020-07-06 RX ORDER — HYDROCORTISONE 5 MG/1
10 TABLET ORAL DAILY
Status: DISCONTINUED | OUTPATIENT
Start: 2020-07-06 | End: 2020-07-06

## 2020-07-06 RX ORDER — HYDROCORTISONE 5 MG/1
10 TABLET ORAL DAILY
Status: DISCONTINUED | OUTPATIENT
Start: 2020-07-08 | End: 2020-07-07

## 2020-07-06 RX ORDER — FAMOTIDINE 20 MG/1
20 TABLET, FILM COATED ORAL 2 TIMES DAILY
Status: DISCONTINUED | OUTPATIENT
Start: 2020-07-06 | End: 2020-07-13 | Stop reason: HOSPADM

## 2020-07-06 RX ORDER — HEPARIN SODIUM (PORCINE) LOCK FLUSH IV SOLN 100 UNIT/ML 100 UNIT/ML
1 SOLUTION INTRAVENOUS PRN
Status: DISCONTINUED | OUTPATIENT
Start: 2020-07-06 | End: 2020-07-13 | Stop reason: HOSPADM

## 2020-07-06 RX ORDER — HYDROCORTISONE 5 MG/1
5 TABLET ORAL NIGHTLY
Status: DISCONTINUED | OUTPATIENT
Start: 2020-07-06 | End: 2020-07-06

## 2020-07-06 RX ORDER — HEPARIN SODIUM (PORCINE) LOCK FLUSH IV SOLN 100 UNIT/ML 100 UNIT/ML
1 SOLUTION INTRAVENOUS EVERY 12 HOURS SCHEDULED
Status: DISCONTINUED | OUTPATIENT
Start: 2020-07-06 | End: 2020-07-13 | Stop reason: HOSPADM

## 2020-07-06 RX ORDER — SODIUM CHLORIDE 0.9 % (FLUSH) 0.9 %
10 SYRINGE (ML) INJECTION PRN
Status: DISCONTINUED | OUTPATIENT
Start: 2020-07-06 | End: 2020-07-13 | Stop reason: HOSPADM

## 2020-07-06 RX ORDER — METOCLOPRAMIDE 10 MG/1
10 TABLET ORAL
Status: DISCONTINUED | OUTPATIENT
Start: 2020-07-06 | End: 2020-07-08

## 2020-07-06 RX ADMIN — Medication 10 ML: at 09:27

## 2020-07-06 RX ADMIN — HYDROCORTISONE 10 MG: 5 TABLET ORAL at 20:51

## 2020-07-06 RX ADMIN — METOCLOPRAMIDE 10 MG: 10 TABLET ORAL at 17:10

## 2020-07-06 RX ADMIN — INSULIN LISPRO 3 UNITS: 100 INJECTION, SOLUTION INTRAVENOUS; SUBCUTANEOUS at 21:03

## 2020-07-06 RX ADMIN — PROMETHAZINE HYDROCHLORIDE 12.5 MG: 25 TABLET ORAL at 03:44

## 2020-07-06 RX ADMIN — FAMOTIDINE 20 MG: 20 TABLET ORAL at 15:41

## 2020-07-06 RX ADMIN — SODIUM CHLORIDE 25 ML: 9 INJECTION, SOLUTION INTRAVENOUS at 21:01

## 2020-07-06 RX ADMIN — FAMOTIDINE 20 MG: 20 TABLET ORAL at 20:50

## 2020-07-06 RX ADMIN — PIPERACILLIN AND TAZOBACTAM 3.38 G: 3; .375 INJECTION, POWDER, FOR SOLUTION INTRAVENOUS at 17:11

## 2020-07-06 RX ADMIN — METOCLOPRAMIDE 10 MG: 10 TABLET ORAL at 20:51

## 2020-07-06 RX ADMIN — INSULIN GLARGINE 25 UNITS: 100 INJECTION, SOLUTION SUBCUTANEOUS at 21:02

## 2020-07-06 RX ADMIN — INSULIN LISPRO 1 UNITS: 100 INJECTION, SOLUTION INTRAVENOUS; SUBCUTANEOUS at 12:10

## 2020-07-06 RX ADMIN — HYDROCORTISONE 20 MG: 20 TABLET ORAL at 15:41

## 2020-07-06 RX ADMIN — SODIUM CHLORIDE 1000 ML: 9 INJECTION, SOLUTION INTRAVENOUS at 20:54

## 2020-07-06 RX ADMIN — INSULIN LISPRO 2 UNITS: 100 INJECTION, SOLUTION INTRAVENOUS; SUBCUTANEOUS at 09:27

## 2020-07-06 RX ADMIN — ENOXAPARIN SODIUM 40 MG: 40 INJECTION SUBCUTANEOUS at 09:26

## 2020-07-06 ASSESSMENT — PAIN SCALES - GENERAL
PAINLEVEL_OUTOF10: 0

## 2020-07-06 NOTE — PROGRESS NOTES
Contact attending provider via perfect serve.     Electronically signed by Rodrigo Ceja RN on 7/6/2020 at 4:53 AM

## 2020-07-06 NOTE — PROGRESS NOTES
Pharmacy Consultation Note  (Antibiotic Dosing and Monitoring)    Initial consult date: 7-6-20  Consulting physician: Ryanne Lewis  Drug: Vancomycin  Indication:     Age/  Gender Height Weight IBW Dosing weight  Allergy Information   34 y.o./female @FLOW(11:first:1)@ @FLOW[14:FIRST:1@     Ideal body weight: 50.1 kg (110 lb 7.2 oz)  Adjusted ideal body weight: 76.7 kg (169 lb 1.1 oz)  116.6kg  Rocephin [ceftriaxone]      @TMAX(24)@        Date  WBC BUN SCr CrCl  (mL/min) Drug/Dose Time   Given Level(s)   (Time) Comments       94 Vancomycin 2000 mg IV                                              Intake/Output Summary (Last 24 hours) at 7/6/2020 0557  Last data filed at 7/6/2020 0151  Gross per 24 hour   Intake 1000 ml   Output 0 ml   Net 1000 ml       Historical Cultures:  Organism   Date Value Ref Range Status   03/05/2020 Citrobacter koseri (A)  Final     No results for input(s): BC in the last 72 hours. Cultures:  available culture and sensitivity results were reviewed in EPIC    Assessment:  37 y.o. female has been initiated Vancomycin. Estimated CrCl = 94 mL/min  Goal trough level = 15-20 mcg/mL    Plan: Will initiate vancomycin at a dose of 2000mg ONCE.   Monitor renal function   Clinical pharmacy to follow      YSABEL Barber [facility] 7/6/2020 5:57 AM

## 2020-07-06 NOTE — CONSULTS
[address] [place] [address] [facility]    Consultation Note     Admit Date: 2020  6:07 AM    Reason for Consult:   Abscess second third toe right foot    Attending Physician:  Hal Samano MD     Chief Complaint: Toe pain    HISTORY OF PRESENT ILLNESS:   The patient is a 37 y.o. Latin woman not known to the Infectious Diseases service. The patient is admitted nausea abdominal pain and also complaints of pain in the toe right foot second toe. She was seen in emergency room for the same on 629 instructed to take doxy. No fevers or chills night sweats but she has been admitted because of the associated symptoms as well as fact she is diabetic. Patient saw inflammatory markers are elevated  Including white count of 12,000 hemoglobin of 12. C-reactive protein is 7.7 up from 2020 C-reactive protein to 5.6. MRI of the foot does show an abscess between the second third toe right foot.   No evidence of osteomyelitis  \        Past Medical History:        Diagnosis Date    Diabetes mellitus ([facility] [address]      Past Surgical History:        Procedure Laterality Date     SECTION      CHOLECYSTECTOMY       Current Medications:   Scheduled Meds:   vancomycin  1,250 mg Intravenous Q8H    lidocaine  5 mL Intradermal Once    heparin flush  1 mL Intravenous 2 times per day    metoclopramide  10 mg Oral 4x Daily AC & HS    hydrocortisone  20 mg Oral Daily    Followed by   Alejandra Howell ON 2020] hydrocortisone  10 mg Oral Daily    hydrocortisone  10 mg Oral Nightly    Followed by   Alejandra Howell ON 2020] hydrocortisone  5 mg Oral Nightly    famotidine  20 mg Oral BID    enoxaparin  40 mg Subcutaneous Daily    sodium chloride flush  10 mL Intravenous 2 times per day    insulin lispro  0-6 Units Subcutaneous TID     insulin lispro  0-3 Units Subcutaneous Nightly    insulin glargine  25 Units Subcutaneous Nightly    sodium chloride  500 mL Intravenous Once     Continuous Infusions:   dextrose       PRN Meds:sodium chloride flush, heparin flush, sodium chloride flush, acetaminophen **OR** acetaminophen, polyethylene glycol, promethazine **OR** ondansetron, glucose, dextrose, glucagon (rDNA), dextrose    Allergies:  Rocephin [ceftriaxone]    Social History:   Social History     Socioeconomic History    Marital status:      Spouse name: None    Number of children: None    Years of education: None    Highest education level: None   Occupational History    None   Social Needs    Financial resource strain: None    Food insecurity     Worry: None     Inability: None    Transportation needs     Medical: None     Non-medical: None   Tobacco Use    Smoking status: Passive Smoke Exposure - Never Smoker    Smokeless tobacco: Never Used   Substance and Sexual Activity    Alcohol use: No    Drug use: No    Sexual activity: None   Lifestyle    Physical activity     Days per week: None     Minutes per session: None    Stress: None   Relationships    Social connections     Talks on phone: None     Gets together: None     Attends Zoroastrianism service: None     Active member of club or organization: None     Attends meetings of clubs or organizations: None     Relationship status: None    Intimate partner violence     Fear of current or ex partner: None     Emotionally abused: None     Physically abused: None     Forced sexual activity: None   Other Topics Concern    None   Social History Narrative    None     Tobacco: No  Alcohol: No  Pets: No  Travel: No    Family History:       Problem Relation Age of Onset    Heart Attack Father    . Otherwise non-pertinent to the chief complaint. REVIEW OF SYSTEMS:    CONSTITUTIONAL:  No chills, fevers or night sweats. No loss of weight. EYES:  No double vision or drainage from eyes, ears or throat. HEENT:  No neck stiffness. No dysphagia. No drainage from eyes, ears or throat  RESPIRATORY:  No cough, productive sputum or hemoptysis. CARDIOVASCULAR:  No chest pain, palpitations, orthopnea or dyspnea on exertion. GASTROINTESTINAL: Positive nausea, positive vomiting, negative diarrhea or constipation or hematochezia   GENITOURINARY:  No frequency burning dysuria or hematuria. INTEGUMENT/BREAST:  No rash or breast masses. HEMATOLOGIC/LYMPHATIC:  No lymphadenopathy or blood dyscrasics. ALLERGIC/IMMUNOLOGIC:  No anaphylaxis. ENDOCRINE:  No polyuria or polydipsia or temperature intolerance. MUSCULOSKELETAL:  No myalgia or arthralgia. Full ROM. See history of present illness reference to right foot  NEUROLOGICAL:  No focal motor sensory deficit. Except for foot drop on the right  BEHAVIOR/PSYCH:  No psychosis. PHYSICAL EXAM:    Vitals:    BP (!) 103/54   Pulse 103   Temp 97.4 °F (36.3 °C) (Temporal)   Resp 16   Ht 5' 2\" (1.575 m)   Wt 257 lb (116.6 kg)   SpO2 94%   BMI 47.01 kg/m²   Constitutional: The patient is awake, alert, and oriented. Skin: Warm and dry. No rashes were noted. No jaundice. HEENT: Eyes show round, and reactive pupils. Moist mucous membranes, no ulcerations, no thrush. Neck: Supple to movements. No lymphadenopathy. Chest: No use of accessory muscles to breathe. Symmetrical expansion. Auscultation reveals no wheezing, crackles, or rhonchi. Cardiovascular: S1 and S2 are rhythmic and regular. No murmurs appreciated. Abdomen: Positive bowel sounds to auscultation. Benign to palpation. No masses felt. No hepatosplenomegaly. Genitourinary: Female  Extremities: No clubbing, no cyanosis, no edema.   Musculoskeletal: Equal and symmetrical second third toe right foot has a skin and soft tissue abscess  Neurological: Footdrop on the right and neuropathy  Lines: peripheral      CBC+dif:  Recent Labs     07/05/20  0647 07/05/20  1856 07/06/20  0436   WBC 11.7* 10.4 12.6*   HGB 12.6 11.4* 12.1   HCT 38.3 34.7 36.7   MCV 81.0 80.7 80.3    327 359   NEUTROABS 8.11* 7.54* 9.29*     Lab Results   Component Value Date    CRP 7.7 (H) 07/05/2020    CRP 5.6 (H) 06/27/2020    CRP 1.8 (H) 03/06/2020     No results found for: CRP  Lab Results   Component Value Date    SEDRATE 96 (H) 07/05/2020    SEDRATE 97 (H) 06/27/2020    SEDRATE 46 (H) 03/06/2020     Lab Results   Component Value Date    ALT 15 07/06/2020    AST 19 07/06/2020    ALKPHOS 142 (H) 07/06/2020    BILITOT 0.5 07/06/2020     Lab Results   Component Value Date     07/06/2020    K 3.8 07/06/2020    CL 98 07/06/2020    CO2 25 07/06/2020    BUN 8 07/06/2020    CREATININE 1.0 07/06/2020    GFRAA >60 07/06/2020    LABGLOM >60 07/06/2020    GLUCOSE 167 07/06/2020    PROT 7.9 07/06/2020    LABALBU 3.6 07/06/2020    CALCIUM 9.1 07/06/2020    BILITOT 0.5 07/06/2020    ALKPHOS 142 07/06/2020    AST 19 07/06/2020    ALT 15 07/06/2020       Lab Results   Component Value Date    PROTIME 13.2 05/01/2017    INR 1.2 05/01/2017       No results found for: TSH    Lab Results   Component Value Date    COLORU Yellow 07/05/2020    PHUR 5.5 07/05/2020    WBCUA 1-3 07/05/2020    RBCUA 1-3 07/05/2020    YEAST RARE 10/19/2019    BACTERIA RARE 07/05/2020    CLARITYU Clear 07/05/2020    SPECGRAV <=1.005 07/05/2020    LEUKOCYTESUR TRACE 07/05/2020    UROBILINOGEN 0.2 07/05/2020    BILIRUBINUR Negative 07/05/2020    BLOODU TRACE-INTACT 07/05/2020    GLUCOSEU >=1000 07/05/2020    AMORPHOUS RARE 05/29/2020       No results found for: KNS1HCA, BEART, G9DDNBSQ, PHART, THGBART, TJC9DLE, PO2ART, NBP7VBB  Radiology:  XR FOOT RIGHT (MIN 3 VIEWS)   Final Result      1. No radiographic evidence of osteomyelitis. 2. No fracture or dislocation. XR CHEST PORTABLE   Final Result   No acute cardiopulmonary abnormality. CT ABDOMEN PELVIS W IV CONTRAST Additional Contrast? None   Final Result         1. Prominently distended urinary bladder dome at the level of the   umbilicus. Please correlate clinically as to the need for   catheterization.    2. Prominence of the right inguinal lymph nodes. A reactive etiology   is favored. Clinical correlation is needed. 3. Mild hepatomegaly. VL LOWER EXTREMITY ARTERIAL SEGMENTAL PRESSURES W PPG BILATERAL    (Results Pending)   MRI FOOT RIGHT WO CONTRAST    (Results Pending)       Microbiology:  Pending  No results for input(s): BC in the last 72 hours. No results for input(s): ORG in the last 72 hours. No results for input(s): Valeria Math in the last 72 hours. No results for input(s): STREPNEUMAGU in the last 72 hours. No results for input(s): LP1UAG in the last 72 hours. No results for input(s): ASO in the last 72 hours. No results for input(s): CULTRESP in the last 72 hours. Assessment:  · Soft tissue abscess/ deep space infection right foot second third toe    Plan:    · Cont Zosyn/ Vanco x1  · Check cultures  · Baseline ESR, CRP  · Monitor labs  · Will follow with you    Thank you for having us see this patient in consultation. I will be discussing this case with the treating physicians.       Electronically signed by Kristy Hoffman MD on 7/6/2020 at 3:33 PM

## 2020-07-06 NOTE — CARE COORDINATION
7/6 Transition of Care: Patient is alert and oriented. She resides in a two story home with her  and children. She sleeps on the sofa and has a bedside commode to use when her  is not home. She has bilateral foot drop. She uses a shower seat and has rails in the bathroom. She does require some assistance wth ADLs. She has been treated in the past with antibiotics due to having blisters nonhealing on her toes. She says her wound did not improve. Her plan is to return home. She does not have a pcp. Her pharmacy is meds to beds if needed. Plan currently is possible OR with possible amputation of toe. Will follow for further plan and needs.   [phone]

## 2020-07-06 NOTE — PROGRESS NOTES
[facility]  [facility] Medicine Residency Program  Progress Note - House Team 2    Patient:  Macario Medrano 37 y.o. female MRN: [id number]     Date of Service: 7/6/2020     CC: had concerns including Abdominal Pain (RUQ pain, onset a couple of hours ago, with nausea. states had similar issue sometime in the alst couple months and was seen in the ER for it but states she was never told what it was) and Dizziness (onset at the same time as her abd pain). Overnight events: No acute events overnight. Hospital day: 1    Subjective     Patient still complains of nausea and lightheadedness. Denies abdominal pain, weakness, fever, chills, vomiting. Unable to eat anything due to nausea. Urinating well, but no bowel movements. Denies being constipated. Rt 2nd toe DM ulcer. Denies pain, LE tingling/numbness, drainage. Unable to place IV line. Will have IV team place a midline today. Blood sugars 160-220      Objective     Physical Exam:  Vitals: BP (!) 108/55   Pulse 100   Temp 97.5 °F (36.4 °C) (Temporal)   Resp 18   Ht 5' 2\" (1.575 m)   Wt 257 lb (116.6 kg)   SpO2 97%   BMI 47.01 kg/m²     I & O - 24hr: No intake/output data recorded. General Appearance: alert, appears stated age, cooperative, moderate distress and morbidly obese  HEENT:  Head: Normal, normocephalic, atraumatic. Neck: no adenopathy and supple, symmetrical, trachea midline  Lung: clear to auscultation bilaterally  Heart: regular rate and rhythm, S1, S2 normal, no murmur, click, rub or gallop  Abdomen: soft, non-tender; bowel sounds normal; no masses,  no organomegaly, obese. Extremities:  Rt 2nd toe swollen with an ulcer; no discharge or signs of infection. Musculokeletal: No joint swelling, no muscle tenderness. ROM normal in all joints of extremities.    Neurologic: Mental status: Alert, oriented, thought content appropriate  Subject  Pertinent Labs & Imaging Studies   winnie  CBC:   Lab Results Component Value Date    WBC 12.6 2020    RBC 4.57 2020    HGB 12.1 2020    HCT 36.7 2020    MCV 80.3 2020    MCH 26.5 2020    MCHC 33.0 2020    RDW 13.0 2020     2020    MPV 11.5 2020     CMP:    Lab Results   Component Value Date     2020    K 3.8 2020    CL 98 2020    CO2 25 2020    BUN 8 2020    CREATININE 1.0 2020    GFRAA >60 2020    LABGLOM >60 2020    GLUCOSE 167 2020    PROT 7.9 2020    LABALBU 3.6 2020    CALCIUM 9.1 2020    BILITOT 0.5 2020    ALKPHOS 142 2020    AST 19 2020    ALT 15 2020     U/A:  No components found for: Prudy Mercy, USPGRAV, UPH, UPROTEIN, UGLUCOSE, UKETONE, UBILI, UBLOOD, [place], [place], [place], [facility], [place], [place], [place], [facility], [place]     Imaging Studies:      Xr Foot Right (2 Views)     Result Date: 2020  Patient MRN:  [id number] : 1985 Age: 37 years Gender: Female Order Date:  2020 5:00 PM EXAM: XR FOOT RIGHT (2 VIEWS) NUMBER OF IMAGES:  2 views INDICATION: Diabetic ulceration right second toe COMPARISON: Previous right foot studies May 2, 2020 and 2019 FINDINGS: Bony structures are intact with preservation of alignment and joint spacing. No acute skeletal pathology is noted plantar and Achilles traction spurs of the calcaneus are evident. Soft tissues about the second toe are prominent, and there is some decreased density in the webspace between the second and third toe, without well-defined gas. There is diffuse forefoot soft tissue swelling on the lateral projection on the dorsal and plantar aspect.      Swelling and decreased density in the forefoot, with some prominence along the second toe and second-third webspace.  Is nonspecific, and if further imaging is felt to BE clinically necessary, noncontrast MRI can identify marrow edema of osteomyelitis as well as bowel disease vs Gastroparesis vs Chronic Adrenal Insuffiency vs Autoimmune bowel disease (Crohn's/Celiac) vs Doxycycline. Epigastric, LUQ/LLQ, pain after large meals. Elevated ESR/CRP. CAT scan (5-29-20)- small bowel thickening w/i jejunal loops which are mildly dilated; Ileal loops dilated w/o wall thickening; findings most likely reflective of enteritis/Crohn's. Repeat CAT scan (7-5-20): Prominently distended urinary bladder dome; Reactive inguinal LN; mild hepatomegaly.    -Pt not passing stools. Stool studies and calprotectin cancelled. -Tylenol prn for pain   -Phenergan or zofran prn for n/v    -Scheduled Reglan for gastroparesis   -Diet: small frequent meals     -Further OP work-up for inflammatory bowel     2. Right Diabetic Foot Ulcer- IDDM, h/o polyneuropathy and BL foot drop. Recently d/c 6-29-20 from ED on Doxycycline. Foot XR unremarkable (no fracture or osteomyelitis). -Vancomycin (pharm to dose). -Follow Podiatry and wound care recommendations.    -Plan for I&D and bone biopsy tomorrow at 4pm with possible amputation   -Follow-up MRI results and VL LE arterial pressures. -NPO after midnight. 3. Hyperglycemia vs ?DKA- IDDM, Abdominal pain, N/V, Lightheadedness. Normal AG 14, BHB 1.72, ketonuria, glucosuria, leukocytosis 11.7, Glucose 410.     -Lispro sliding scale   -Glargine 25U SQ nightly and 5U pre-meal   -Follow POCT glucose Q4h    4. Chronic Adrenal Insufficiency- Hypotension and dizziness. Receives hydrocortisone 15mg at home. /55, .    -F/u orthostatic VS   -Starting stress dose of hydrocortisone (20 mg AM; 10 mg PM). -In two days, will resume home dose (10 mg AM; 5 mg PM). 5. IDDM- A1c (7-5-20): 10.1;  (400 on admission). -Lispro sliding scale    -Glargine 25U SQ nightly   -Follow POCT glucose Q4h    6. Leukocytosis- 2/2 ? DKA vs ?Inflammatory bowel disease vs ?Osteomyelitis Rt toe. WBC up-trending to 12.6 today (11.7-10.4-12.6).  Afebrile.      -Follow

## 2020-07-06 NOTE — PROGRESS NOTES
Pharmacy Consultation Note  (Antibiotic Dosing and Monitoring)    Initial consult date: 7/6/20  Consulting physician: Dr. Balbina Burdick  Drug(s): Vancomycin  Indication: R Diabetic Foot Ulcer      Age/  Gender Height Weight IBW Dosing weight  Allergy Information   34 y.o./female 5' 2\" (157.5 cm) 225 lb (102.1 kg)     Ideal body weight: 50.1 kg (110 lb 7.2 oz)  Adjusted ideal body weight: 76.7 kg (169 lb 1.1 oz)  76.7 kg  Rocephin [ceftriaxone]          Other anti-infectives Start date Stop date                         Date  Tmax WBC BUN/CR CrCL  (mL/min) Drug/Dose Time   Given Level(s)   (Time) Comments   7/6 afebrile 12.6  8/1 >90  Vancomycin 1250 mg IV q8h                                             Intake/Output Summary (Last 24 hours) at 7/6/2020 0935  Last data filed at 7/6/2020 0839  Gross per 24 hour   Intake 1000 ml   Output 0 ml   Net 1000 ml       Cultures:    Site Date Result   Toe Wound     Urine     Blood       No results for input(s): Lorenzo Melendrez in the last 72 hours. Historical Cultures:  Organism   Date Value Ref Range Status   03/05/2020 Citrobacter koseri (A)  Final     No results for input(s): BC in the last 72 hours. Assessment:  · 37 yoF initiated on vancomycin for a diabetic foot ulcer. The patient was on Doxycycline as an outpatient for the same complaint. Pharmacy consulted to dose/monitor vancomycin.   · Goal trough = 15 - 20 mcg/ml  · sCr stable    Plan:  · Start vancomycin 1250 mg IV q8h  · Trough at steady state  · Pharmacist will follow and monitor/adjust dosing as necessary    Chance Perez, PharmD, BCPS 7/6/2020 9:35 AM

## 2020-07-06 NOTE — PROGRESS NOTES
Multiple attempts was made for IV insertion. IV attempts failed. IV team was notified earlier in the day in regards to insertion of new IV site due to poor access of veins.      Electronically signed by Zuleyma Segura RN on 7/5/2020 at 11:55 PM

## 2020-07-06 NOTE — CONSULTS
Department of Podiatry Surgery   Consult Note        Reason for Consult:  cellulitis, Right 2nd toe  Requesting Physician:  Bernadette Gonsales MD     CHIEF COMPLAINT:  Right 2nd toe wound     HISTORY OF PRESENT ILLNESS:   patient present complaining of right 2nd toe wound. Patient states that on the  she noticed a blister on  Her right 2nd toe, states that she went to the ED and was discharged home with antibiotics. The patient state that she her wound did not improved prompting her to visit the ED again few days later and was later discharged with antibiotics. Podiatry was consulted on this admission to evaluate her right 2nd toe. Patient denies any pain to her right foot. States that noticed that her toe is more swollen now. Patient admits to feeling light headed and nauseous. Admits to SOB. Patient has no other pedal complaints    Medical History     Past Medical History:   Diagnosis Date    Diabetes mellitus ([facility] [address]        Surgical History:  Past Surgical History:   Procedure Laterality Date     SECTION      CHOLECYSTECTOMY         Family History:  Family History   Problem Relation Age of Onset    Heart Attack Father        Allergies: Allergies   Allergen Reactions    Rocephin [Ceftriaxone] Rash       I reviewed the patient's past medical and surgical history, Medications and Allergies.       Current Facility-Administered Medications:     hydrocortisone (CORTEF) tablet 10 mg, 10 mg, Oral, Daily, Bernadette Gonsales MD    hydrocortisone (CORTEF) tablet 5 mg, 5 mg, Oral, Nightly, Bernadette Gonsales MD    vancomycin (VANCOCIN) 2,000 mg in dextrose 5 % 500 mL IVPB, 2,000 mg, Intravenous, Once, Bernadette Gonsales MD    enoxaparin (LOVENOX) injection 40 mg, 40 mg, Subcutaneous, Daily, Parminder Rossi DO, 40 mg at 20 0926    sodium chloride flush 0.9 % injection 10 mL, 10 mL, Intravenous, 2 times per day, Bernadette Gonsales MD, 10 mL at 20 09    sodium chloride flush 0.9 % injection 10 mL, 10 mL, Intravenous, PRN, Ariel Rao MD    acetaminophen (TYLENOL) tablet 650 mg, 650 mg, Oral, Q6H PRN **OR** acetaminophen (TYLENOL) suppository 650 mg, 650 mg, Rectal, Q6H PRN, Ariel Rao MD    polyethylene glycol (GLYCOLAX) packet 17 g, 17 g, Oral, Daily PRN, Ariel Rao MD    promethazine (PHENERGAN) tablet 12.5 mg, 12.5 mg, Oral, Q6H PRN, 12.5 mg at 07/06/20 0344 **OR** ondansetron (ZOFRAN) injection 4 mg, 4 mg, Intravenous, Q6H PRN, Ariel Rao MD    insulin lispro (HUMALOG) injection vial 0-6 Units, 0-6 Units, Subcutaneous, TID WC, Ariel Rao MD, 6 Units at 07/05/20 1733    insulin lispro (HUMALOG) injection vial 0-3 Units, 0-3 Units, Subcutaneous, Nightly, Ariel Rao MD, 2 Units at 07/05/20 2043    insulin glargine (LANTUS) injection vial 25 Units, 25 Units, Subcutaneous, Nightly, Ariel Rao MD, 25 Units at 07/05/20 2043    glucose (GLUTOSE) 40 % oral gel 15 g, 15 g, Oral, PRN, Ariel Rao MD    dextrose 50 % IV solution, 12.5 g, Intravenous, PRN, Ariel Rao MD    glucagon (rDNA) injection 1 mg, 1 mg, Intramuscular, PRN, Ariel Rao MD    dextrose 5 % solution, 100 mL/hr, Intravenous, PRN, Ariel aRo MD    0.9 % sodium chloride bolus, 500 mL, Intravenous, Once, Ariel Rao MD     REVIEW OF SYSTEMS:    10 point review of systems is negative unless otherwise noted in the HPI        PHYSICAL EXAM:       Vitals:    07/05/20 1730 07/05/20 1954 07/05/20 2351 07/06/20 0450   BP: (!) 90/53 (!) 104/56 (!) 108/51 (!) 108/55   Pulse: 87 94 88 100   Resp:  18 18    Temp:  97.1 °F (36.2 °C) 97.5 °F (36.4 °C)    TempSrc:  Temporal Temporal    SpO2:  96% 97%    Weight:       Height:           VASCULAR:  DP and PT pulses are palpable. CFT less than 3 seconds. Warm to warm from the tibial tuberosity to the distal aspect of the digits dorsally. Hair growth noted to the distal aspects dorsally.   NEUROLOGIC:  Protective sensation is diminished by grossly intact  DERM:  Erythema and edema noted to the dorsal aspect of the right 2,3 and 4th toes. Open wound noted on the plantar aspect of the 2nd toe. Wound does not probe to bone, no drainage noted, no malodor noted. Wound Care Documentation:  Wound 07/05/20 Toe (Comment  which one) Anterior;Right (Active)   Offloading for Diabetic Foot Ulcers Other (comment) 7/5/2020  3:11 PM   Wound Cleansed Rinsed/Irrigated with saline 7/5/2020  3:11 PM   Wound Length (cm) 1 cm 7/5/2020  3:11 PM   Wound Width (cm) 3 cm 7/5/2020  3:11 PM   Wound Depth (cm) 0.01 cm 7/5/2020  3:11 PM   Wound Surface Area (cm^2) 3 cm^2 7/5/2020  3:11 PM   Wound Volume (cm^3) 0.03 cm^3 7/5/2020  3:11 PM   Wound Assessment Dry; Intact; Yellow; White;Black 7/5/2020  3:11 PM   Drainage Amount None 7/5/2020  3:11 PM   Odor None 7/5/2020  3:11 PM   Margins Unattached edges 7/5/2020  3:11 PM   Odalys-wound Assessment Red;Purple;Painful 7/5/2020  3:11 PM   Number of days: 0     MUSCULOSKELETAL:    Bilateral Ankle exam displays mild swelling, mild ecchymosis. There is no deformity. Range of motion is 10 degrees dorsiflexion, 45 degrees plantarflexion. Resisted external rotation is negative. Resisted internal rotation is negative. Palpation of the lateral malleolus reveals no tenderness. Palpation of the medial malleolus reveals no tenderness. Palpation ATFL reveals no tenderness. Palpation over deltoid ligament reveals no tenderness. Palpation over the 5th metartarsal reveals no tenderness. Subtalar is not limited and is not painful. No tenderness with palpation of the calf.  B/L drop foot    LABS:  CBC with Differential:    Lab Results   Component Value Date    WBC 12.6 07/06/2020    RBC 4.57 07/06/2020    HGB 12.1 07/06/2020    HCT 36.7 07/06/2020     07/06/2020    MCV 80.3 07/06/2020    MCH 26.5 07/06/2020    MCHC 33.0 07/06/2020    RDW 13.0 07/06/2020    LYMPHOPCT 15.4 07/06/2020    MONOPCT 7.1 07/06/2020    BASOPCT 0.9 07/06/2020 MONOSABS 0.89 2020    LYMPHSABS 1.94 2020    EOSABS 0.25 2020    BASOSABS 0.11 2020     Platelets:    Lab Results   Component Value Date     2020     Hemoglobin/Hematocrit:    Lab Results   Component Value Date    HGB 12.1 2020    HCT 36.7 2020     CMP:    Lab Results   Component Value Date     2020    K 3.8 2020    CL 98 2020    CO2 25 2020    BUN 8 2020    CREATININE 1.0 2020    GFRAA >60 2020    LABGLOM >60 2020    GLUCOSE 167 2020    PROT 7.9 2020    LABALBU 3.6 2020    CALCIUM 9.1 2020    BILITOT 0.5 2020    ALKPHOS 142 2020    AST 19 2020    ALT 15 2020       Scheduled Meds:   hydrocortisone  10 mg Oral Daily    hydrocortisone  5 mg Oral Nightly    vancomycin  2,000 mg Intravenous Once    enoxaparin  40 mg Subcutaneous Daily    sodium chloride flush  10 mL Intravenous 2 times per day    insulin lispro  0-6 Units Subcutaneous TID WC    insulin lispro  0-3 Units Subcutaneous Nightly    insulin glargine  25 Units Subcutaneous Nightly    sodium chloride  500 mL Intravenous Once       Continuous Infusions:   dextrose         PRN Meds:.sodium chloride flush, acetaminophen **OR** acetaminophen, polyethylene glycol, promethazine **OR** ondansetron, glucose, dextrose, glucagon (rDNA), dextrose    DIAGNOSTICS:  Xr Foot Right (2 Views)    Result Date: 2020  Patient MRN:  [id number] : 1985 Age: 37 years Gender: Female Order Date:  2020 5:00 PM EXAM: XR FOOT RIGHT (2 VIEWS) NUMBER OF IMAGES:  2 views INDICATION: Diabetic ulceration right second toe COMPARISON: Previous right foot studies May 2, 2020 and 2019 FINDINGS: Bony structures are intact with preservation of alignment and joint spacing. No acute skeletal pathology is noted plantar and Achilles traction spurs of the calcaneus are evident.  Soft tissues about the second toe are prominent, and there is some decreased density in the webspace between the second and third toe, without well-defined gas. There is diffuse forefoot soft tissue swelling on the lateral projection on the dorsal and plantar aspect. Swelling and decreased density in the forefoot, with some prominence along the second toe and second-third webspace. Is nonspecific, and if further imaging is felt to BE clinically necessary, noncontrast MRI can identify marrow edema of osteomyelitis as well as localized soft tissue pathology such as abscess formation. Xr Foot Right (min 3 Views)    Result Date: 2020  Patient MRN:  [id number] : 1985 Age: 37 years Gender: Female Order Date:  2020 4:15 PM EXAM: XR FOOT RIGHT (MIN 3 VIEWS) COMPARISON: 2020 INDICATION: Pain VIEWS: Three views of the foot FINDINGS: No erosive changes evident to suggest radiographic evidence of osteomyelitis. No fracture or dislocation. There is normal tarsal bone alignment. There is plantar calcaneal spurring. Soft tissue swelling is seen at the dorsal aspect of the foot. 1. No radiographic evidence of osteomyelitis. 2. No fracture or dislocation. Ct Abdomen Pelvis W Iv Contrast Additional Contrast? None    Result Date: 2020  Patient MRN:  [id number] : 1985 Age: 37 years Gender: Female Order Date:  2020 6:30 AM EXAM: CT ABDOMEN PELVIS W IV CONTRAST INDICATION:  Epigastric / LUQ abdominal pain Epigastric / LUQ abdominal pain  COMPARISON: CT of the abdomen and pelvis, 2020 Technique: Low-dose CT  acquisition technique included one of following options; 1 . Automated exposure control, 2. Adjustment of MA and or KV according to patient's size or 3. Use of iterative reconstruction. Multiple computerized tomography sections of the abdomen and pelvis with sagittal and coronal MPR reconstructions were obtained from the top of the diaphragm to the pelvis. FINDINGS: LOWER THORAX: Unremarkable.  LIVER: Mildly enlarged. The abdomen is partially excluded from the field-of-view. No focal lesion or ductal dilatation. Bhavani Sleet GALLBLADDER: Surgically absent. SPLEEN:Unremarkable. ADRENALS:Unremarkable. KIDNEYS:Unremarkable. PANCREAS:Unremarkable. BOWEL:Unremarkable. APPENDIX:Unremarkable. BLADDER: Prominently distended, with the dome at the level of the umbilicus. REPRODUCTIVE ORGANS:Unremarkable. VASCULATURE:No aortic aneurysm. LYMPH NODES: Mild prominence of the right inguinal lymph nodes. BONES:Unremarkable. MISCELLANEOUS:No additional finding. 1. Prominently distended urinary bladder dome at the level of the umbilicus. Please correlate clinically as to the need for catheterization. 2. Prominence of the right inguinal lymph nodes. A reactive etiology is favored. Clinical correlation is needed. 3. Mild hepatomegaly. Xr Chest Portable    Result Date: 2020  Patient MRN: [id number] : 1985 Age:  37 years Gender: Female Order Date: 2020 3:30 PM Exam: XR CHEST PORTABLE Indication:   PNA PNA Comparison: Chest radiographs, 3/5/2020 FINDINGS: The lungs are clear. The cardiomediastinal contour is unremarkable. No pneumothorax or pleural effusion. Evaluation of the bones reveals no fracture or destructive lesion. No acute cardiopulmonary abnormality. Assessment  - Cellulitis, right 2nd toe. Possible abscess  - Diabetic with ulceration  - Elevated ESR  - Leucocytosis      Plan  - Patient was examined and evaluated. - Reviewed patient's recent lab results and pertinent diagnostic imaging.  - Reviewed ancillary service notes. - Right 2nd toe is clinical infected, suspecting an abscess on the right 2nd toe. - XR of right foot reviewed. No evidence of Osteomyelitis, non contrast MRI ordered to rule out bone edema and abscess. If MRI is positive for Osteomyelitis, we would recommend a R 2nd toe amputation.  -For now patient patient will be scheduled for I&D and bone biopsy pending MRI results.  Plan for I&D of right 2nd toe with possible amputation tomorrow at 4pm  -NPO after midnight  - Discussed patient with Dr. Sharda Jones DPM.  - Will continue to follow patient while they are in-house.     Electronically signed by Alejo Berg on 7/6/2020 at 9:27 AM

## 2020-07-07 ENCOUNTER — ANESTHESIA (OUTPATIENT)
Dept: OPERATING ROOM | Age: 35
DRG: 314 | End: 2020-07-07
Payer: COMMERCIAL

## 2020-07-07 ENCOUNTER — ANESTHESIA EVENT (OUTPATIENT)
Dept: OPERATING ROOM | Age: 35
DRG: 314 | End: 2020-07-07
Payer: COMMERCIAL

## 2020-07-07 VITALS
RESPIRATION RATE: 19 BRPM | SYSTOLIC BLOOD PRESSURE: 102 MMHG | DIASTOLIC BLOOD PRESSURE: 59 MMHG | OXYGEN SATURATION: 100 %

## 2020-07-07 LAB
ALBUMIN SERPL-MCNC: 3.4 G/DL (ref 3.5–5.2)
ALP BLD-CCNC: 147 U/L (ref 35–104)
ALT SERPL-CCNC: 16 U/L (ref 0–32)
ANION GAP SERPL CALCULATED.3IONS-SCNC: 15 MMOL/L (ref 7–16)
AST SERPL-CCNC: 27 U/L (ref 0–31)
BASOPHILS ABSOLUTE: 0.08 E9/L (ref 0–0.2)
BASOPHILS RELATIVE PERCENT: 0.6 % (ref 0–2)
BILIRUB SERPL-MCNC: 0.6 MG/DL (ref 0–1.2)
BUN BLDV-MCNC: 9 MG/DL (ref 6–20)
CALCIUM SERPL-MCNC: 8.7 MG/DL (ref 8.6–10.2)
CHLORIDE BLD-SCNC: 94 MMOL/L (ref 98–107)
CO2: 23 MMOL/L (ref 22–29)
CREAT SERPL-MCNC: 1 MG/DL (ref 0.5–1)
EKG ATRIAL RATE: 107 BPM
EKG P AXIS: 79 DEGREES
EKG P-R INTERVAL: 140 MS
EKG Q-T INTERVAL: 336 MS
EKG QRS DURATION: 84 MS
EKG QTC CALCULATION (BAZETT): 448 MS
EKG R AXIS: 19 DEGREES
EKG T AXIS: -2 DEGREES
EKG VENTRICULAR RATE: 107 BPM
EOSINOPHILS ABSOLUTE: 0.01 E9/L (ref 0.05–0.5)
EOSINOPHILS RELATIVE PERCENT: 0.1 % (ref 0–6)
GFR AFRICAN AMERICAN: >60
GFR NON-AFRICAN AMERICAN: >60 ML/MIN/1.73
GLUCOSE BLD-MCNC: 352 MG/DL (ref 74–99)
HCT VFR BLD CALC: 35.6 % (ref 34–48)
HEMOGLOBIN: 11.7 G/DL (ref 11.5–15.5)
IMMATURE GRANULOCYTES #: 0.08 E9/L
IMMATURE GRANULOCYTES %: 0.6 % (ref 0–5)
LYMPHOCYTES ABSOLUTE: 1.47 E9/L (ref 1.5–4)
LYMPHOCYTES RELATIVE PERCENT: 10.4 % (ref 20–42)
MAGNESIUM: 2.1 MG/DL (ref 1.6–2.6)
MCH RBC QN AUTO: 26.2 PG (ref 26–35)
MCHC RBC AUTO-ENTMCNC: 32.9 % (ref 32–34.5)
MCV RBC AUTO: 79.8 FL (ref 80–99.9)
METER GLUCOSE: 145 MG/DL (ref 74–99)
METER GLUCOSE: 150 MG/DL (ref 74–99)
METER GLUCOSE: 150 MG/DL (ref 74–99)
METER GLUCOSE: 205 MG/DL (ref 74–99)
METER GLUCOSE: 251 MG/DL (ref 74–99)
METER GLUCOSE: 312 MG/DL (ref 74–99)
MONOCYTES ABSOLUTE: 0.68 E9/L (ref 0.1–0.95)
MONOCYTES RELATIVE PERCENT: 4.8 % (ref 2–12)
NEUTROPHILS ABSOLUTE: 11.84 E9/L (ref 1.8–7.3)
NEUTROPHILS RELATIVE PERCENT: 83.5 % (ref 43–80)
PDW BLD-RTO: 12.9 FL (ref 11.5–15)
PHOSPHORUS: 3.7 MG/DL (ref 2.5–4.5)
PLATELET # BLD: 305 E9/L (ref 130–450)
PMV BLD AUTO: 11.3 FL (ref 7–12)
POTASSIUM REFLEX MAGNESIUM: 4.3 MMOL/L (ref 3.5–5)
RBC # BLD: 4.46 E12/L (ref 3.5–5.5)
SODIUM BLD-SCNC: 132 MMOL/L (ref 132–146)
TOTAL PROTEIN: 7.6 G/DL (ref 6.4–8.3)
WBC # BLD: 14.2 E9/L (ref 4.5–11.5)

## 2020-07-07 PROCEDURE — 3700000000 HC ANESTHESIA ATTENDED CARE: Performed by: PODIATRIST

## 2020-07-07 PROCEDURE — 2580000003 HC RX 258: Performed by: PODIATRIST

## 2020-07-07 PROCEDURE — 87205 SMEAR GRAM STAIN: CPT

## 2020-07-07 PROCEDURE — 76937 US GUIDE VASCULAR ACCESS: CPT

## 2020-07-07 PROCEDURE — 7100000001 HC PACU RECOVERY - ADDTL 15 MIN: Performed by: PODIATRIST

## 2020-07-07 PROCEDURE — 2140000000 HC CCU INTERMEDIATE R&B

## 2020-07-07 PROCEDURE — 2580000003 HC RX 258

## 2020-07-07 PROCEDURE — 36410 VNPNXR 3YR/> PHY/QHP DX/THER: CPT

## 2020-07-07 PROCEDURE — 6370000000 HC RX 637 (ALT 250 FOR IP): Performed by: STUDENT IN AN ORGANIZED HEALTH CARE EDUCATION/TRAINING PROGRAM

## 2020-07-07 PROCEDURE — 0QBQ3ZX EXCISION OF RIGHT TOE PHALANX, PERCUTANEOUS APPROACH, DIAGNOSTIC: ICD-10-PCS | Performed by: PODIATRIST

## 2020-07-07 PROCEDURE — 83735 ASSAY OF MAGNESIUM: CPT

## 2020-07-07 PROCEDURE — 6370000000 HC RX 637 (ALT 250 FOR IP): Performed by: INTERNAL MEDICINE

## 2020-07-07 PROCEDURE — 88304 TISSUE EXAM BY PATHOLOGIST: CPT

## 2020-07-07 PROCEDURE — 80053 COMPREHEN METABOLIC PANEL: CPT

## 2020-07-07 PROCEDURE — 99233 SBSQ HOSP IP/OBS HIGH 50: CPT | Performed by: INTERNAL MEDICINE

## 2020-07-07 PROCEDURE — 87206 SMEAR FLUORESCENT/ACID STAI: CPT

## 2020-07-07 PROCEDURE — 87186 SC STD MICRODIL/AGAR DIL: CPT

## 2020-07-07 PROCEDURE — 6370000000 HC RX 637 (ALT 250 FOR IP): Performed by: PODIATRIST

## 2020-07-07 PROCEDURE — 3600000012 HC SURGERY LEVEL 2 ADDTL 15MIN: Performed by: PODIATRIST

## 2020-07-07 PROCEDURE — 6360000002 HC RX W HCPCS

## 2020-07-07 PROCEDURE — 87147 CULTURE TYPE IMMUNOLOGIC: CPT

## 2020-07-07 PROCEDURE — 05HB33Z INSERTION OF INFUSION DEVICE INTO RIGHT BASILIC VEIN, PERCUTANEOUS APPROACH: ICD-10-PCS | Performed by: INTERNAL MEDICINE

## 2020-07-07 PROCEDURE — C1751 CATH, INF, PER/CENT/MIDLINE: HCPCS

## 2020-07-07 PROCEDURE — 7100000000 HC PACU RECOVERY - FIRST 15 MIN: Performed by: PODIATRIST

## 2020-07-07 PROCEDURE — 6360000002 HC RX W HCPCS: Performed by: PODIATRIST

## 2020-07-07 PROCEDURE — 3600000002 HC SURGERY LEVEL 2 BASE: Performed by: PODIATRIST

## 2020-07-07 PROCEDURE — 87116 MYCOBACTERIA CULTURE: CPT

## 2020-07-07 PROCEDURE — 0J9Q0ZZ DRAINAGE OF RIGHT FOOT SUBCUTANEOUS TISSUE AND FASCIA, OPEN APPROACH: ICD-10-PCS | Performed by: PODIATRIST

## 2020-07-07 PROCEDURE — 84100 ASSAY OF PHOSPHORUS: CPT

## 2020-07-07 PROCEDURE — 36415 COLL VENOUS BLD VENIPUNCTURE: CPT

## 2020-07-07 PROCEDURE — 87070 CULTURE OTHR SPECIMN AEROBIC: CPT

## 2020-07-07 PROCEDURE — 93010 ELECTROCARDIOGRAM REPORT: CPT | Performed by: INTERNAL MEDICINE

## 2020-07-07 PROCEDURE — 88311 DECALCIFY TISSUE: CPT

## 2020-07-07 PROCEDURE — 85025 COMPLETE CBC W/AUTO DIFF WBC: CPT

## 2020-07-07 PROCEDURE — 87102 FUNGUS ISOLATION CULTURE: CPT

## 2020-07-07 PROCEDURE — 87015 SPECIMEN INFECT AGNT CONCNTJ: CPT

## 2020-07-07 PROCEDURE — 3700000001 HC ADD 15 MINUTES (ANESTHESIA): Performed by: PODIATRIST

## 2020-07-07 PROCEDURE — 82962 GLUCOSE BLOOD TEST: CPT

## 2020-07-07 PROCEDURE — 87075 CULTR BACTERIA EXCEPT BLOOD: CPT

## 2020-07-07 RX ORDER — RANITIDINE 150 MG/1
300 TABLET ORAL DAILY
Status: DISCONTINUED | OUTPATIENT
Start: 2020-07-07 | End: 2020-07-07 | Stop reason: CLARIF

## 2020-07-07 RX ORDER — FENTANYL CITRATE 50 UG/ML
INJECTION, SOLUTION INTRAMUSCULAR; INTRAVENOUS PRN
Status: DISCONTINUED | OUTPATIENT
Start: 2020-07-07 | End: 2020-07-07 | Stop reason: SDUPTHER

## 2020-07-07 RX ORDER — COSYNTROPIN 0.25 MG/ML
250 INJECTION, POWDER, FOR SOLUTION INTRAMUSCULAR; INTRAVENOUS ONCE
Status: COMPLETED | OUTPATIENT
Start: 2020-07-08 | End: 2020-07-08

## 2020-07-07 RX ORDER — INSULIN GLARGINE 100 [IU]/ML
25 INJECTION, SOLUTION SUBCUTANEOUS NIGHTLY
Status: DISCONTINUED | OUTPATIENT
Start: 2020-07-07 | End: 2020-07-09

## 2020-07-07 RX ORDER — GABAPENTIN 300 MG/1
600 CAPSULE ORAL 2 TIMES DAILY WITH MEALS
Status: DISCONTINUED | OUTPATIENT
Start: 2020-07-07 | End: 2020-07-13 | Stop reason: HOSPADM

## 2020-07-07 RX ORDER — VANCOMYCIN HYDROCHLORIDE 1 G/20ML
INJECTION, POWDER, LYOPHILIZED, FOR SOLUTION INTRAVENOUS PRN
Status: DISCONTINUED | OUTPATIENT
Start: 2020-07-07 | End: 2020-07-07 | Stop reason: SDUPTHER

## 2020-07-07 RX ORDER — INSULIN GLARGINE 100 [IU]/ML
30 INJECTION, SOLUTION SUBCUTANEOUS NIGHTLY
Status: DISCONTINUED | OUTPATIENT
Start: 2020-07-07 | End: 2020-07-07

## 2020-07-07 RX ORDER — SODIUM CHLORIDE 9 MG/ML
INJECTION, SOLUTION INTRAVENOUS CONTINUOUS PRN
Status: DISCONTINUED | OUTPATIENT
Start: 2020-07-07 | End: 2020-07-07 | Stop reason: SDUPTHER

## 2020-07-07 RX ORDER — PANTOPRAZOLE SODIUM 40 MG/1
40 TABLET, DELAYED RELEASE ORAL
Status: DISCONTINUED | OUTPATIENT
Start: 2020-07-08 | End: 2020-07-13 | Stop reason: HOSPADM

## 2020-07-07 RX ORDER — PROPOFOL 10 MG/ML
INJECTION, EMULSION INTRAVENOUS CONTINUOUS PRN
Status: DISCONTINUED | OUTPATIENT
Start: 2020-07-07 | End: 2020-07-07 | Stop reason: SDUPTHER

## 2020-07-07 RX ADMIN — SODIUM CHLORIDE, PRESERVATIVE FREE 100 UNITS: 5 INJECTION INTRAVENOUS at 20:07

## 2020-07-07 RX ADMIN — FENTANYL CITRATE 50 MCG: 50 INJECTION, SOLUTION INTRAMUSCULAR; INTRAVENOUS at 18:25

## 2020-07-07 RX ADMIN — VANCOMYCIN HYDROCHLORIDE 2000 MG: 10 INJECTION, POWDER, LYOPHILIZED, FOR SOLUTION INTRAVENOUS at 19:25

## 2020-07-07 RX ADMIN — INSULIN HUMAN 10 UNITS: 100 INJECTION, SOLUTION PARENTERAL at 09:44

## 2020-07-07 RX ADMIN — SODIUM CHLORIDE: 9 INJECTION, SOLUTION INTRAVENOUS at 17:58

## 2020-07-07 RX ADMIN — FAMOTIDINE 20 MG: 20 TABLET ORAL at 08:42

## 2020-07-07 RX ADMIN — FAMOTIDINE 20 MG: 20 TABLET ORAL at 20:05

## 2020-07-07 RX ADMIN — PROMETHAZINE HYDROCHLORIDE 12.5 MG: 25 TABLET ORAL at 06:28

## 2020-07-07 RX ADMIN — VANCOMYCIN HYDROCHLORIDE 1000 MG: 1 INJECTION, POWDER, LYOPHILIZED, FOR SOLUTION INTRAVENOUS at 18:02

## 2020-07-07 RX ADMIN — INSULIN GLARGINE 25 UNITS: 100 INJECTION, SOLUTION SUBCUTANEOUS at 20:09

## 2020-07-07 RX ADMIN — METOCLOPRAMIDE 10 MG: 10 TABLET ORAL at 12:06

## 2020-07-07 RX ADMIN — HYDROCORTISONE 20 MG: 20 TABLET ORAL at 08:42

## 2020-07-07 RX ADMIN — PROPOFOL 100 MCG/KG/MIN: 10 INJECTION, EMULSION INTRAVENOUS at 18:01

## 2020-07-07 RX ADMIN — FENTANYL CITRATE 50 MCG: 50 INJECTION, SOLUTION INTRAMUSCULAR; INTRAVENOUS at 18:01

## 2020-07-07 RX ADMIN — GABAPENTIN 600 MG: 300 CAPSULE ORAL at 20:05

## 2020-07-07 RX ADMIN — Medication 10 ML: at 20:08

## 2020-07-07 RX ADMIN — METOCLOPRAMIDE 10 MG: 10 TABLET ORAL at 06:05

## 2020-07-07 RX ADMIN — INSULIN LISPRO 3 UNITS: 100 INJECTION, SOLUTION INTRAVENOUS; SUBCUTANEOUS at 20:09

## 2020-07-07 RX ADMIN — INSULIN LISPRO 4 UNITS: 100 INJECTION, SOLUTION INTRAVENOUS; SUBCUTANEOUS at 06:22

## 2020-07-07 RX ADMIN — METOCLOPRAMIDE 10 MG: 10 TABLET ORAL at 20:07

## 2020-07-07 ASSESSMENT — PULMONARY FUNCTION TESTS
PIF_VALUE: 0
PIF_VALUE: 1
PIF_VALUE: 0
PIF_VALUE: 1
PIF_VALUE: 0

## 2020-07-07 ASSESSMENT — PAIN SCALES - GENERAL
PAINLEVEL_OUTOF10: 0

## 2020-07-07 ASSESSMENT — PAIN DESCRIPTION - ORIENTATION: ORIENTATION: RIGHT

## 2020-07-07 ASSESSMENT — PAIN DESCRIPTION - LOCATION: LOCATION: FOOT

## 2020-07-07 ASSESSMENT — PAIN DESCRIPTION - PAIN TYPE: TYPE: SURGICAL PAIN

## 2020-07-07 NOTE — BRIEF OP NOTE
Brief Postoperative Note      Patient: Homer Shafer  YOB: 1985  MRN: [id number]    Date of Procedure: 7/7/2020    Pre-Op Diagnosis: Right foot cellulitis. Right second toe acute OM? Post-Op Diagnosis: Same       Procedure(s):  RIGHT foot INCISION AND DRAINAGE multiple compartments. Right 2nd toe bone biopsy    Surgeon(s):  Tirso Moore DPM    Assistant:  * No surgical staff found *    Anesthesia: Monitor Anesthesia Care    Estimated Blood Loss (mL): Minimal    Complications: None    Specimens:   ID Type Source Tests Collected by Time Destination   1 : 2ND RIGHT TOE SWAB Tissue Tissue CULTURE, ANAEROBIC, CULTURE, FUNGUS, CULTURE, SURGICAL Tirso Costa DPM 7/7/2020 1815    2 : RIGHT 2ND TOE BONE Bone Bone CULTURE, ANAEROBIC, CULTURE, FUNGUS, GRAM STAIN, CULTURE, SURGICAL, CULTURE WITH SMEAR, ACID FAST BACILLIUS Tirso Cotsa DPM 7/7/2020 1827    A : RIGHT 2ND TOE BONE Bone Bone SURGICAL PATHOLOGY Tirso Costa DPM 7/7/2020 1826        Implants:  * No implants in log *      Drains: * No LDAs found *    Findings: 10 cc purulent abscess between 2nd and third metatarsals distally and between 2nd and third toes.     Electronically signed by Florin Espinal DPM on 7/7/2020 at 6:32 PM

## 2020-07-07 NOTE — PROGRESS NOTES
Temp  Av.5 °F (36.4 °C)  Min: 96.5 °F (35.8 °C)  Max: 99.1 °F (37.3 °C)  Constitutional: The patient is awake, alert, and oriented. Skin: Warm and dry. No rashes were noted. HEENT: Round and reactive pupils. Moist mucous membranes. No ulcerations or thrush. Neck: Supple to movements. Chest: No use of accessory muscles to breathe. Symmetrical expansion. No wheezing, crackles or rhonchi. Cardiovascular: S1 and S2 are rhythmic and regular. No murmurs appreciated. Abdomen: Positive bowel sounds to auscultation. Benign to palpation. No masses felt. No hepatosplenomegaly.   Extremities: second third toe right foot has a skin and soft tissue abscess  Neurological: Footdrop on the right and neuropathy  Lines:Midline    Laboratory and Tests Review:  Lab Results   Component Value Date    WBC 14.2 (H) 2020    WBC 12.6 (H) 2020    WBC 10.4 2020    HGB 11.7 2020    HCT 35.6 2020    MCV 79.8 (L) 2020     2020     Lab Results   Component Value Date    NEUTROABS 11.84 (H) 2020    NEUTROABS 9.29 (H) 2020    NEUTROABS 7.54 (H) 2020     No results found for: [facility]  Lab Results   Component Value Date    ALT 16 2020    AST 27 2020    ALKPHOS 147 (H) 2020    BILITOT 0.6 2020     Lab Results   Component Value Date     2020    K 4.3 2020    CL 94 2020    CO2 23 2020    BUN 9 2020    CREATININE 1.0 2020    CREATININE 1.0 2020    CREATININE 1.0 2020    GFRAA >60 2020    LABGLOM >60 2020    GLUCOSE 352 2020    PROT 7.6 2020    LABALBU 3.4 2020    CALCIUM 8.7 2020    BILITOT 0.6 2020    ALKPHOS 147 2020    AST 27 2020    ALT 16 2020     Lab Results   Component Value Date    CRP 7.7 (H) 2020    CRP 5.6 (H) 2020    CRP 1.8 (H) 2020     Lab Results   Component Value Date    SEDRATE 96 (H) 2020 SEDRATE 97 (H) 06/27/2020    SEDRATE 46 (H) 03/06/2020       Radiology:  Noted    Microbiology:   Blood culture 7/5/2020 20-24 hours no growth  Urine culture 7/5/2020-mixed omi with gram-positive's and gram-negative rods. Assessment:  · Soft tissue abscess/ deep space infection right foot second third toe  · Leukocytosis     Plan:    · Cont Zosyn  · Vanco x1 dose  · Check cultures  · Baseline CRP-7.7, sedimentation rate- 96  · Midline placed 7/7/2020  · Monitor labs  · Plan for OR today for I&D of foot wound   · Will follow with you       Reid Tamayo, APRN - CNP   7/7/2020     Pt seen and examined. Above discussed agree with advanced practice nurse. Labs, cultures, and radiographs reviewed. Face to Face encounter occurred. Changes made as necessary.      Mary Holman MD

## 2020-07-07 NOTE — ANESTHESIA POSTPROCEDURE EVALUATION
Department of Anesthesiology  Postprocedure Note    Patient: Homa Couch  MRN: [id number]  YOB: 1985  Date of evaluation: 7/7/2020  Time:  6:50 PM     Procedure Summary     Date:  07/07/20 Room / Location:  [facility] OR 06 / [facility]    Anesthesia Start:  [address] [facility] Anesthesia Stop:  5405    Procedure:  RIGHT 2ND TOE  INCISION AND DRAINAGE (Right ) Diagnosis:  (.)    Surgeon:  Vaughn Oliver DPM Responsible Provider:  Hui Angelo MD    Anesthesia Type:  MAC ASA Status:  3          Anesthesia Type: MAC    Gayle Phase I: Gayle Score: 10    Gayle Phase II:      Last vitals: Reviewed and per EMR flowsheets.        Anesthesia Post Evaluation    Patient location during evaluation: PACU  Patient participation: complete - patient participated  Level of consciousness: awake and alert  Pain score: 0  Airway patency: patent  Nausea & Vomiting: no vomiting and no nausea  Complications: no  Cardiovascular status: hemodynamically stable  Respiratory status: acceptable  Hydration status: stable

## 2020-07-07 NOTE — ANESTHESIA PRE PROCEDURE
Facility-Administered Medications   Medication Dose Route Frequency Provider Last Rate Last Dose    insulin lispro (HUMALOG) injection vial 0-12 Units  0-12 Units Subcutaneous TID  Bassem Tracy MD        insulin lispro (HUMALOG) injection vial 0-6 Units  0-6 Units Subcutaneous Nightly Bassem Tracy MD        insulin glargine (LANTUS) injection vial 30 Units  30 Units Subcutaneous Nightly MD Elver Ribeiro Estrella Nurse ON 7/8/2020] cosyntropin (CORTROSYN) injection 250 mcg  250 mcg Intravenous Once Bassem Tracy MD        lidocaine 1 % injection 5 mL  5 mL Intradermal Once Rochelle Avery MD   Stopped at 07/06/20 1724    sodium chloride flush 0.9 % injection 10 mL  10 mL Intravenous PRN Rochelle Avery MD        heparin flush 100 UNIT/ML injection 100 Units  1 mL Intravenous 2 times per day Rochelle Avery MD        heparin flush 100 UNIT/ML injection 100 Units  1 mL Intracatheter PRN Rochelle Avery MD        metoclopramide (REGLAN) tablet 10 mg  10 mg Oral 4x Daily AC & HS Severa Gammons, MD   10 mg at 07/07/20 1206    famotidine (PEPCID) tablet 20 mg  20 mg Oral BID Severa Gammons, MD   20 mg at 07/07/20 1418    vancomycin (VANCOCIN) 2,000 mg in dextrose 5 % 500 mL IVPB  2,000 mg Intravenous On Call to Adria Gomez MD        piperacillin-tazobactam (ZOSYN) 3.375 g in dextrose 5 % 100 mL IVPB extended infusion (mini-bag)  3.375 g Intravenous Q8H Wendy Madrid MD   Stopped at 07/06/20 2101    And    0.9 % sodium chloride infusion admixture   Intravenous Q8H Wendy Madrid MD   Stopped at 07/06/20 2144    enoxaparin (LOVENOX) injection 40 mg  40 mg Subcutaneous Daily Ronald Diaz DO   Stopped at 07/07/20 0748    sodium chloride flush 0.9 % injection 10 mL  10 mL Intravenous 2 times per day Abimael Dutta MD   10 mL at 07/06/20 0927    sodium chloride flush 0.9 % injection 10 mL  10 mL Intravenous PRN MD Elver Mike Evaluation  Patient summary reviewed and Nursing notes reviewed  Airway: Mallampati: III  TM distance: >3 FB   Neck ROM: full  Mouth opening: > = 3 FB Dental: normal exam         Pulmonary:Negative Pulmonary ROS and normal exam  breath sounds clear to auscultation                             Cardiovascular:Negative CV ROS            Rhythm: regular  Rate: normal                 ROS comment: Hypotension     Neuro/Psych:   Negative Neuro/Psych ROS              GI/Hepatic/Renal: Neg GI/Hepatic/Renal ROS           ROS comment: Ileitis. Endo/Other:    (+) DiabetesType II DM, using insulin, . ROS comment: Adrenal Insufficiency  Amenorrhea Abdominal:   (+) obese,         Vascular: negative vascular ROS. Anesthesia Plan      MAC     ASA 3       Induction: intravenous. Anesthetic plan and risks discussed with patient. Plan discussed with CRNA and attending.                   Monika Loya MD   7/7/2020

## 2020-07-07 NOTE — PROGRESS NOTES
Patient admitted to PACU and paced on appropriate monitors. Airway patent at this time. Report obtained from CRNA. Warm blankets applied.

## 2020-07-07 NOTE — PROGRESS NOTES
Pharmacy was consulted to dose/monitor vancomycin which has now been discontinued. Clinical Pharmacy will sign off at this time.     Lazarus Abide, PharmD 7/7/2020 1:15 PM

## 2020-07-07 NOTE — CARE COORDINATION
7/7 Update CM Note: patient npo for OR today for I/d of foot wound with possible amputation. Plan is to return home at discharge. Did discuss possible need for home care prior to discharge. Patient does not have a community pcp. Will check with podiatry on needs for discharge and possible home care orders if warranted. Patient does not have a preference of home care agency to be used if ordered. Will follow post procedure.  JUDITH

## 2020-07-07 NOTE — PROGRESS NOTES
[facility]  Internal Medicine Residency Program  Progress Note - House Team 2    Patient:  Vinny Regalado 37 y.o. female MRN: [id number]     Date of Service: 7/7/2020     CC: had concerns including Abdominal Pain (RUQ pain, onset a couple of hours ago, with nausea. states had similar issue sometime in the alst couple months and was seen in the ER for it but states she was never told what it was) and Dizziness (onset at the same time as her abd pain). Overnight events: No acute events overnight. Hospital day: 2    Subjective     Patient still complains of slight nausea, but improved from yesterday. Slight abdominal pain in the LUQ. Denies abdominal pain, weakness, fever, chills, vomiting. Rt 2nd toe DM ulcer: NPO after midnight and going for I&D with podiatry today at 4pm. Denies pain, LE tingling/numbness, drainage. MRI (7-6-20): cellulitis of the mid-distal foot with [id number] abscess between the 2nd and 3rd metatarsal heads on the dorsal surface. No evidence to confirm osteomyelitis. VL LE arterial pressures (7-6-20): WNL. Good arterial blood flow to the Rt foot. 2 unsuccessful attempts at restarting IV overnight. IV team to come today. Positive orthostatic vitals. 1L NS given. Blood sugars 352. Objective     Physical Exam:  Vitals: BP (!) 118/53   Pulse 88   Temp 99.1 °F (37.3 °C) (Temporal)   Resp 18   Ht 5' 2\" (1.575 m)   Wt 254 lb (115.2 kg)   SpO2 95%   BMI 46.46 kg/m²     I & O - 24hr: No intake/output data recorded. General Appearance: alert, appears stated age, cooperative, moderate distress and morbidly obese  HEENT:  Head: Normal, normocephalic, atraumatic. Neck: no adenopathy and supple, symmetrical, trachea midline  Lung: clear to auscultation bilaterally  Heart: regular rate and rhythm, S1, S2 normal, no murmur, click, rub or gallop  Abdomen: soft, non-tender; bowel sounds normal; no masses,  no organomegaly, obese.   Extremities: Rt 2nd toe swollen with an ulcer; no discharge or signs of infection. Musculokeletal: No joint swelling, no muscle tenderness. ROM normal in all joints of extremities. Neurologic: Mental status: Alert, oriented, thought content appropriate  Subject  Pertinent Labs & Imaging Studies   winnie  CBC:   Lab Results   Component Value Date    WBC 14.2 2020    RBC 4.46 2020    HGB 11.7 2020    HCT 35.6 2020    MCV 79.8 2020    MCH 26.2 2020    MCHC 32.9 2020    RDW 12.9 2020     2020    MPV 11.3 2020     CMP:    Lab Results   Component Value Date     2020    K 4.3 2020    CL 94 2020    CO2 23 2020    BUN 9 2020    CREATININE 1.0 2020    GFRAA >60 2020    LABGLOM >60 2020    GLUCOSE 352 2020    PROT 7.6 2020    LABALBU 3.4 2020    CALCIUM 8.7 2020    BILITOT 0.6 2020    ALKPHOS 147 2020    AST 27 2020    ALT 16 2020     U/A:  No components found for: Floresita Lowry, USPGRAV, UPH, UPROTEIN, UGLUCOSE, UKETONE, UBILI, UBLOOD, [place], [place], [place], [facility], [place], [place], [place], [facility], [place]     Imaging Studies:      Xr Foot Right (2 Views)     Result Date: 2020  Patient MRN:  [id number] : 1985 Age: 37 years Gender: Female Order Date:  2020 5:00 PM EXAM: XR FOOT RIGHT (2 VIEWS) NUMBER OF IMAGES:  2 views INDICATION: Diabetic ulceration right second toe COMPARISON: Previous right foot studies May 2, 2020 and 2019 FINDINGS: Bony structures are intact with preservation of alignment and joint spacing. No acute skeletal pathology is noted plantar and Achilles traction spurs of the calcaneus are evident. Soft tissues about the second toe are prominent, and there is some decreased density in the webspace between the second and third toe, without well-defined gas.  There is diffuse forefoot soft tissue swelling on the lateral projection on the dorsal and plantar aspect.      Swelling and decreased density in the forefoot, with some prominence along the second toe and second-third webspace. Is nonspecific, and if further imaging is felt to BE clinically necessary, noncontrast MRI can identify marrow edema of osteomyelitis as well as localized soft tissue pathology such as abscess formation.     Ct Abdomen Pelvis W Iv Contrast Additional Contrast? None     Result Date: 2020  Patient MRN:  [id number] : 1985 Age: 37 years Gender: Female Order Date:  2020 6:30 AM EXAM: CT ABDOMEN PELVIS W IV CONTRAST INDICATION:  Epigastric / LUQ abdominal pain Epigastric / LUQ abdominal pain  COMPARISON: CT of the abdomen and pelvis, 2020 Technique: Low-dose CT  acquisition technique included one of following options; 1 . Automated exposure control, 2. Adjustment of MA and or KV according to patient's size or 3. Use of iterative reconstruction. Multiple computerized tomography sections of the abdomen and pelvis with sagittal and coronal MPR reconstructions were obtained from the top of the diaphragm to the pelvis. FINDINGS: LOWER THORAX: Unremarkable. LIVER: Mildly enlarged. The abdomen is partially excluded from the field-of-view. No focal lesion or ductal dilatation. Collette Ruts GALLBLADDER: Surgically absent. SPLEEN:Unremarkable. ADRENALS:Unremarkable. KIDNEYS:Unremarkable. PANCREAS:Unremarkable. BOWEL:Unremarkable. APPENDIX:Unremarkable. BLADDER: Prominently distended, with the dome at the level of the umbilicus. REPRODUCTIVE ORGANS:Unremarkable. VASCULATURE:No aortic aneurysm. LYMPH NODES: Mild prominence of the right inguinal lymph nodes. BONES:Unremarkable. MISCELLANEOUS:No additional finding.      1. Prominently distended urinary bladder dome at the level of the umbilicus. Please correlate clinically as to the need for catheterization. 2. Prominence of the right inguinal lymph nodes. A reactive etiology is favored.  Clinical correlation is needed. 3. Mild hepatomegaly. Narrative   Patient MRN: [id number]   : 1985   Age:  36 years   Gender: Female   Order Date: 2020 3:30 PM   Exam: XR CHEST PORTABLE   Indication:   PNA    PNA    Comparison: Chest radiographs, 3/5/2020        FINDINGS:       The lungs are clear. The cardiomediastinal contour is unremarkable. No   pneumothorax or pleural effusion. Evaluation of the bones reveals no   fracture or destructive lesion.           Impression   No acute cardiopulmonary abnormality. Narrative   Comparison:       X-rays of the right foot 2020.       Clinical indications:       Patient is unable to flex toes. Sore on second digit of right foot. Osteomyelitis.       TECHNIQUE:       Multiplanar multisequence MRI of the right foot was performed without   contrast.       There is considerable decreased T1 and increased T2 signal within the   soft tissues of the dorsum of the midfoot extending into the mid and   distal foot as well. A 12 x 10 x 5 mm fluid collection along the   dorsal surface of the foot wedged between the second and third   metatarsal heads which might represent a small abscess. This   examination is performed without contrast. There is loss of field   homogeneity on the sagittal views. There is no definite abnormal   signal intensity within the osseous structures to suggest   osteomyelitis.           Impression       Cellulitis of the mid and distal foot with 12 x 10 x 5 mm abscess   between the second and third metatarsal heads on the dorsal surface of   foot.       No evidence to confirm osteomyelitis.                     Resident's Assessment and Plan     Nely Mccormick is a 37 y.o. female with PMHx of IDDM & Adrenal Insufficiency presenting with LUQ abdominal pain, nausea, & vomiting with hyperglycemia/DKA and Rt DM foot ulcer.      1. Abdominal Pain- 2/2 ?inflammatory bowel disease vs Gastroparesis vs Chronic Adrenal Insuffiency vs Autoimmune bowel disease (Crohn's/Celiac) vs Doxycycline. Epigastric, LUQ/LLQ, pain after large meals. Elevated ESR/CRP. CAT scan (5-29-20)- small bowel thickening w/i jejunal loops which are mildly dilated; Ileal loops dilated w/o wall thickening; findings most likely reflective of enteritis/Crohn's. Repeat CAT scan (7-5-20): Prominently distended urinary bladder dome; Reactive inguinal LN; mild hepatomegaly.    -Pt not passing stools. Stool studies and calprotectin cancelled. -Tylenol prn for pain   -Phenergan or zofran prn for n/v    -Scheduled Reglan for gastroparesis   -Diet: small frequent meals     -Further OP work-up for inflammatory bowel     2. Right Diabetic Foot Ulcer- IDDM, h/o polyneuropathy and BL foot drop. Recently d/c 6-29-20 from ED on Doxycycline. Foot XR unremarkable (no fracture or osteomyelitis). Leukocytosis, CRP elevated at 7.7 (5.6 on 6-27-20). MRI of the foot does show an abscess between 2nd & 3rd metatarsal on the Rt foot; no evidence of osteomyelitis. Vascular studies showed good arterial blood flow to the Rt foot. WBC 14.2 today. -Vancomycin (pharm to dose) and ID added Zosyn.    -Follow Podiatry and wound care recommendations.    -Plan for I&D and bone biopsy today at 4pm with possible amputation.    -Follow-up cultures    3. Hyperglycemia vs ?DKA- IDDM, Abdominal pain, N/V, Lightheadedness. Normal AG 14, BHB 1.72, ketonuria, glucosuria, leukocytosis 11.7, and Glucose 410 on admission.  today;    -Lispro sliding scale increased from low to medium.    -Glargine 25U SQ nightly increased to 30U and 5U pre-meal.    -10 U regular insulin given this morning.   -Follow POCT glucose Q4h    4. Chronic Adrenal Insufficiency- Hypotension and dizziness. Receives hydrocortisone 15mg at home. /55, .    -Positive orthostatic VS => 1L NS given. -Starting stress dose of hydrocortisone (20 mg AM; 10 mg PM). -In two days, will resume home dose (10 mg AM; 5 mg PM).      5. IDDM- A1c (7-5-20): dorsal surface of foot. No evidence to confirm osteomyelitis    >50% of time spent coordinating care with other providers and/or counseling patient/family  Remainder of medical problems as per resident note.

## 2020-07-07 NOTE — PROGRESS NOTES
Wound Care: Patient having surgery by podiatry, Dr. Leonor Lyons today for right foot abscess. No need for wound care at this time.  Radha Lagos

## 2020-07-08 PROBLEM — K31.84 GASTROPARESIS: Status: ACTIVE | Noted: 2020-07-08

## 2020-07-08 PROBLEM — I95.1 ORTHOSTATIC HYPOTENSION: Status: ACTIVE | Noted: 2020-03-05

## 2020-07-08 PROBLEM — L03.115 CELLULITIS OF FOOT, RIGHT: Status: ACTIVE | Noted: 2020-07-08

## 2020-07-08 PROBLEM — I99.8 ISCHEMIC TOE: Status: ACTIVE | Noted: 2020-07-08

## 2020-07-08 LAB
CORTISOL TOTAL: 10.43 MCG/DL (ref 2.68–18.4)
CORTISOL TOTAL: 12.97 MCG/DL (ref 2.68–18.4)
CORTISOL TOTAL: 5.86 MCG/DL (ref 2.68–18.4)
GRAM STAIN ORDERABLE: NORMAL
METER GLUCOSE: 207 MG/DL (ref 74–99)
METER GLUCOSE: 254 MG/DL (ref 74–99)
METER GLUCOSE: 312 MG/DL (ref 74–99)
METER GLUCOSE: 390 MG/DL (ref 74–99)

## 2020-07-08 PROCEDURE — 6370000000 HC RX 637 (ALT 250 FOR IP): Performed by: INTERNAL MEDICINE

## 2020-07-08 PROCEDURE — 6360000002 HC RX W HCPCS: Performed by: PODIATRIST

## 2020-07-08 PROCEDURE — 2580000003 HC RX 258: Performed by: INTERNAL MEDICINE

## 2020-07-08 PROCEDURE — 99253 IP/OBS CNSLTJ NEW/EST LOW 45: CPT | Performed by: SURGERY

## 2020-07-08 PROCEDURE — 82962 GLUCOSE BLOOD TEST: CPT

## 2020-07-08 PROCEDURE — 6370000000 HC RX 637 (ALT 250 FOR IP): Performed by: STUDENT IN AN ORGANIZED HEALTH CARE EDUCATION/TRAINING PROGRAM

## 2020-07-08 PROCEDURE — 82533 TOTAL CORTISOL: CPT

## 2020-07-08 PROCEDURE — 6360000002 HC RX W HCPCS: Performed by: INTERNAL MEDICINE

## 2020-07-08 PROCEDURE — 2580000003 HC RX 258: Performed by: PODIATRIST

## 2020-07-08 PROCEDURE — 36415 COLL VENOUS BLD VENIPUNCTURE: CPT

## 2020-07-08 PROCEDURE — 99233 SBSQ HOSP IP/OBS HIGH 50: CPT | Performed by: INTERNAL MEDICINE

## 2020-07-08 PROCEDURE — 6370000000 HC RX 637 (ALT 250 FOR IP): Performed by: PODIATRIST

## 2020-07-08 PROCEDURE — 6370000000 HC RX 637 (ALT 250 FOR IP): Performed by: SURGERY

## 2020-07-08 PROCEDURE — 2140000000 HC CCU INTERMEDIATE R&B

## 2020-07-08 RX ORDER — KETOROLAC TROMETHAMINE 30 MG/ML
30 INJECTION, SOLUTION INTRAMUSCULAR; INTRAVENOUS ONCE
Status: COMPLETED | OUTPATIENT
Start: 2020-07-08 | End: 2020-07-08

## 2020-07-08 RX ORDER — 0.9 % SODIUM CHLORIDE 0.9 %
1000 INTRAVENOUS SOLUTION INTRAVENOUS ONCE
Status: COMPLETED | OUTPATIENT
Start: 2020-07-08 | End: 2020-07-08

## 2020-07-08 RX ORDER — CILOSTAZOL 100 MG/1
100 TABLET ORAL 2 TIMES DAILY
Status: DISCONTINUED | OUTPATIENT
Start: 2020-07-08 | End: 2020-07-13 | Stop reason: HOSPADM

## 2020-07-08 RX ORDER — METOCLOPRAMIDE 5 MG/1
5 TABLET ORAL
Status: DISCONTINUED | OUTPATIENT
Start: 2020-07-08 | End: 2020-07-13 | Stop reason: HOSPADM

## 2020-07-08 RX ORDER — IBUPROFEN 400 MG/1
400 TABLET ORAL 3 TIMES DAILY PRN
Status: DISCONTINUED | OUTPATIENT
Start: 2020-07-08 | End: 2020-07-13 | Stop reason: HOSPADM

## 2020-07-08 RX ORDER — ACETAMINOPHEN 500 MG
500 TABLET ORAL EVERY 8 HOURS PRN
Status: DISCONTINUED | OUTPATIENT
Start: 2020-07-08 | End: 2020-07-13 | Stop reason: HOSPADM

## 2020-07-08 RX ORDER — ASPIRIN 81 MG/1
81 TABLET ORAL DAILY
Status: DISCONTINUED | OUTPATIENT
Start: 2020-07-08 | End: 2020-07-13 | Stop reason: HOSPADM

## 2020-07-08 RX ADMIN — PIPERACILLIN AND TAZOBACTAM 3.38 G: 3; .375 INJECTION, POWDER, FOR SOLUTION INTRAVENOUS at 18:20

## 2020-07-08 RX ADMIN — SODIUM CHLORIDE: 9 INJECTION, SOLUTION INTRAVENOUS at 22:27

## 2020-07-08 RX ADMIN — ACETAMINOPHEN 650 MG: 325 TABLET ORAL at 01:14

## 2020-07-08 RX ADMIN — COSYNTROPIN 250 MCG: 0.25 INJECTION, POWDER, LYOPHILIZED, FOR SOLUTION INTRAMUSCULAR; INTRAVENOUS at 08:26

## 2020-07-08 RX ADMIN — INSULIN GLARGINE 25 UNITS: 100 INJECTION, SOLUTION SUBCUTANEOUS at 20:55

## 2020-07-08 RX ADMIN — INSULIN LISPRO 5 UNITS: 100 INJECTION, SOLUTION INTRAVENOUS; SUBCUTANEOUS at 20:54

## 2020-07-08 RX ADMIN — PANTOPRAZOLE SODIUM 40 MG: 40 TABLET, DELAYED RELEASE ORAL at 06:36

## 2020-07-08 RX ADMIN — ASPIRIN 81 MG: 81 TABLET, COATED ORAL at 21:01

## 2020-07-08 RX ADMIN — METOCLOPRAMIDE 10 MG: 10 TABLET ORAL at 06:34

## 2020-07-08 RX ADMIN — IBUPROFEN 400 MG: 400 TABLET, FILM COATED ORAL at 08:26

## 2020-07-08 RX ADMIN — CILOSTAZOL 100 MG: 100 TABLET ORAL at 21:01

## 2020-07-08 RX ADMIN — FAMOTIDINE 20 MG: 20 TABLET ORAL at 08:29

## 2020-07-08 RX ADMIN — SODIUM CHLORIDE, PRESERVATIVE FREE 100 UNITS: 5 INJECTION INTRAVENOUS at 08:26

## 2020-07-08 RX ADMIN — Medication 10 ML: at 09:51

## 2020-07-08 RX ADMIN — METOCLOPRAMIDE 5 MG: 5 TABLET ORAL at 20:47

## 2020-07-08 RX ADMIN — GABAPENTIN 600 MG: 300 CAPSULE ORAL at 18:21

## 2020-07-08 RX ADMIN — INSULIN LISPRO 12 UNITS: 100 INJECTION, SOLUTION INTRAVENOUS; SUBCUTANEOUS at 11:33

## 2020-07-08 RX ADMIN — INSULIN LISPRO 6 UNITS: 100 INJECTION, SOLUTION INTRAVENOUS; SUBCUTANEOUS at 16:54

## 2020-07-08 RX ADMIN — SODIUM CHLORIDE: 9 INJECTION, SOLUTION INTRAVENOUS at 05:35

## 2020-07-08 RX ADMIN — PIPERACILLIN AND TAZOBACTAM 3.38 G: 3; .375 INJECTION, POWDER, FOR SOLUTION INTRAVENOUS at 11:00

## 2020-07-08 RX ADMIN — IBUPROFEN 400 MG: 400 TABLET, FILM COATED ORAL at 22:28

## 2020-07-08 RX ADMIN — GABAPENTIN 600 MG: 300 CAPSULE ORAL at 08:29

## 2020-07-08 RX ADMIN — PIPERACILLIN AND TAZOBACTAM 3.38 G: 3; .375 INJECTION, POWDER, FOR SOLUTION INTRAVENOUS at 01:14

## 2020-07-08 RX ADMIN — KETOROLAC TROMETHAMINE 30 MG: 30 INJECTION, SOLUTION INTRAMUSCULAR at 04:53

## 2020-07-08 RX ADMIN — SODIUM CHLORIDE 1000 ML: 9 INJECTION, SOLUTION INTRAVENOUS at 11:33

## 2020-07-08 RX ADMIN — METOCLOPRAMIDE 5 MG: 5 TABLET ORAL at 18:20

## 2020-07-08 RX ADMIN — FAMOTIDINE 20 MG: 20 TABLET ORAL at 20:47

## 2020-07-08 RX ADMIN — INSULIN LISPRO 15 UNITS: 100 INJECTION, SOLUTION INTRAVENOUS; SUBCUTANEOUS at 08:32

## 2020-07-08 RX ADMIN — METOCLOPRAMIDE 10 MG: 10 TABLET ORAL at 11:00

## 2020-07-08 ASSESSMENT — PAIN DESCRIPTION - LOCATION: LOCATION: FOOT

## 2020-07-08 ASSESSMENT — PAIN DESCRIPTION - PAIN TYPE: TYPE: SURGICAL PAIN

## 2020-07-08 ASSESSMENT — PAIN DESCRIPTION - DESCRIPTORS: DESCRIPTORS: ACHING

## 2020-07-08 ASSESSMENT — PAIN SCALES - GENERAL
PAINLEVEL_OUTOF10: 10
PAINLEVEL_OUTOF10: 8
PAINLEVEL_OUTOF10: 0
PAINLEVEL_OUTOF10: 7
PAINLEVEL_OUTOF10: 6

## 2020-07-08 ASSESSMENT — PAIN DESCRIPTION - ORIENTATION: ORIENTATION: RIGHT

## 2020-07-08 NOTE — CONSULTS
Chief Complaint: Patient seen for evaluation of vascular status, ischemic toe, cellulitis of the right foot      HPI: This patient, who has diabetes mellitus, diabetic polyneuropathy, with poorly controlled blood glucose levels, did have a ulcer of the second toe right foot, since June, was hospitalized with abdominal pain nausea, emesis etc. undergoing work-up,, was found to have worsening of the foot infection on the right side, was seen by Dr. Markus Nazario, underwent surgery on 7 July, with incision and drainage along with bone biopsy of the second toe for possible osteomyelitis    Patient also underwent a lower extremity artery Doppler study yesterday, abnormal, also was found to have cyanotic discoloration of the middle toe, and vascular service was consulted for further evaluation    Is noted, patient had difficult control blood glucose levels    Patient does not smoke    The  patient denies any history of fever or chills    When I came to see patient, with help of nursing staff, patient was resting comfortably, concern regarding the discoloration of the toe    Patient denies any focal lateralizing neurological symptoms like loss of speech, vision or loss of function of extremity    Patient can walk short distances patient does not smoke, and denies any symptoms of rest pain    Allergies   Allergen Reactions    Rocephin [Ceftriaxone] Rash       Current Facility-Administered Medications   Medication Dose Route Frequency Provider Last Rate Last Dose    ibuprofen (ADVIL;MOTRIN) tablet 400 mg  400 mg Oral TID PRN Bettye Black MD   400 mg at 07/08/20 0826    And    acetaminophen (TYLENOL) tablet 500 mg  500 mg Oral Q8H PRN Bettye Black MD        insulin lispro (HUMALOG) injection vial 0-18 Units  0-18 Units Subcutaneous TID  Ben Domingo MD   6 Units at 07/08/20 1654    insulin lispro (HUMALOG) injection vial 0-9 Units  0-9 Units Subcutaneous Nightly Ben Domingo MD        metoclopramide [facility]) tablet 5 mg  5 mg Oral 4x Daily AC & HS Williams Gunderson MD   5 mg at 07/08/20 1820    aspirin EC tablet 81 mg  81 mg Oral Daily Brandon Marcial MD        cilostazol (PLETAL) tablet 100 mg  100 mg Oral BID Brandon Marcial MD        gabapentin (NEURONTIN) capsule 600 mg  600 mg Oral BID  Jose Yeager MD   600 mg at 07/08/20 1821    pantoprazole (PROTONIX) tablet 40 mg  40 mg Oral QAM AC Jose Yeager MD   40 mg at 07/08/20 0636    insulin glargine (LANTUS) injection vial 25 Units  25 Units Subcutaneous Nightly Tirso X Fahim, DPM   25 Units at 07/07/20 2009    lidocaine 1 % injection 5 mL  5 mL Intradermal Once Tirso X Igor, DPM   Stopped at 07/06/20 1724    sodium chloride flush 0.9 % injection 10 mL  10 mL Intravenous PRN Tirso X Tavareshim, DPM        heparin flush 100 UNIT/ML injection 100 Units  1 mL Intravenous 2 times per day Tirso X Igor, DPM   100 Units at 07/08/20 0826    heparin flush 100 UNIT/ML injection 100 Units  1 mL Intracatheter PRN Tirso X Fahim, DPM        famotidine (PEPCID) tablet 20 mg  20 mg Oral BID Tirso X Fahim, DPM   20 mg at 07/08/20 0829    piperacillin-tazobactam (ZOSYN) 3.375 g in dextrose 5 % 100 mL IVPB extended infusion (mini-bag)  3.375 g Intravenous Q8H Tirso X Igor, DPM 25 mL/hr at 07/08/20 1820 3.375 g at 07/08/20 1820    And    0.9 % sodium chloride infusion admixture   Intravenous Q8H Tirso X Fahim, DPM   Stopped at 07/08/20 0800    enoxaparin (LOVENOX) injection 40 mg  40 mg Subcutaneous Daily Tirso X Jonathanm, DPM   Stopped at 07/07/20 0748    sodium chloride flush 0.9 % injection 10 mL  10 mL Intravenous 2 times per day Tirso X Fahim, DPM   10 mL at 07/08/20 0951    sodium chloride flush 0.9 % injection 10 mL  10 mL Intravenous PRN Tirso X Fahim, DPM        acetaminophen (TYLENOL) tablet 650 mg  650 mg Oral Q6H PRN Tirso X Fahim, DPM   650 mg at 07/08/20 0114    Or    acetaminophen (TYLENOL) suppository 650 mg  650 mg Rectal Q6H PRN Tirso X Fahim, DPM  polyethylene glycol (GLYCOLAX) packet 17 g  17 g Oral Daily PRN Tirso X Fahim, DPM        promethazine (PHENERGAN) tablet 12.5 mg  12.5 mg Oral Q6H PRN Tirso X Fahim, DPM   12.5 mg at 20 0537    Or    ondansetron (ZOFRAN) injection 4 mg  4 mg Intravenous Q6H PRN Tirso X Fahim, DPM        glucose (GLUTOSE) 40 % oral gel 15 g  15 g Oral PRN Tirso X Fahim, DPM        dextrose 50 % IV solution  12.5 g Intravenous PRN Tirso X Fahim, DPM        glucagon (rDNA) injection 1 mg  1 mg Intramuscular PRN Tirso X Fahim, DPM        dextrose 5 % solution  100 mL/hr Intravenous PRN Tirso X Fahim, DPM        0.9 % sodium chloride bolus  500 mL Intravenous Once Tirso X Fahim, DPM           Past Medical History:   Diagnosis Date    Cellulitis of foot, right 2020    Diabetes mellitus ([facility] [address]     Ischemic toe 2020       Past Surgical History:   Procedure Laterality Date     SECTION      CHOLECYSTECTOMY      FOOT DEBRIDEMENT Right 2020    RIGHT 2ND TOE  INCISION AND DRAINAGE performed by Rohit Reynolds DPM at [facility]       Family History   Problem Relation Age of Onset    Heart Attack Father        Social History     Socioeconomic History    Marital status:      Spouse name: Not on file    Number of children: Not on file    Years of education: Not on file    Highest education level: Not on file   Occupational History    Not on file   Social Needs    Financial resource strain: Not on file    Food insecurity     Worry: Not on file     Inability: Not on file    Transportation needs     Medical: Not on file     Non-medical: Not on file   Tobacco Use    Smoking status: Passive Smoke Exposure - Never Smoker    Smokeless tobacco: Never Used   Substance and Sexual Activity    Alcohol use: No    Drug use: No    Sexual activity: Not on file   Lifestyle    Physical activity     Days per week: Not on file     Minutes per session: Not on file    Stress: Not on file   Relationships    Social connections     Talks on phone: Not on file     Gets together: Not on file     Attends Hinduism service: Not on file     Active member of club or organization: Not on file     Attends meetings of clubs or organizations: Not on file     Relationship status: Not on file    Intimate partner violence     Fear of current or ex partner: Not on file     Emotionally abused: Not on file     Physically abused: Not on file     Forced sexual activity: Not on file   Other Topics Concern    Not on file   Social History Narrative    Not on file       Review of Systems:  Skin:  No abnormal pigmentation or rash. Eyes:  No blurring, diplopia or vision loss. Ears/Nose/Throat:  No hearing loss or vertigo. Respiratory:  No cough, pleuritic chest pain, dyspnea, or wheezing. Cardiovascular: No angina, palpitations . Gastrointestinal:  No nausea or vomiting; no abdominal pain or rectal bleeding. Musculoskeletal:  No arthritis or weakness. Neurologic:  No paralysis, paresis, seizures or headaches. Hematologic/Lymphatic/Immunologic:  No anemia, abnormal bleeding/bruising. Endocrine:  No heat or cold intolerance. No polyphagia, polydipsia or polyuria. Diabetes mellitus with diabetic polyneuropathy    History of chronic adrenal insufficiency on hydrocortisone supplementation      Physical Exam:  BP (!) 90/53   Pulse 83   Temp 97.3 °F (36.3 °C) (Temporal)   Resp 16   Ht 5' 2\" (1.575 m)   Wt 259 lb 6.4 oz (117.7 kg)   SpO2 98%   BMI 47.44 kg/m²   General appearance:  Alert, awake, oriented x 3. No distress. Skin:  Warm and dry. Head:  Normocephalic. No masses, lesions or tenderness. Eyes:  Conjunctivae appear normal; PERRL. Ears:  External ears normal.  Nose/Sinuses:  Septum midline, mucosa normal; no drainage. Oropharynx:  Clear, no exudate noted. Neck:  No jugular venous distention, lymphadenopathy or thyromegaly. No evidence of carotid bruit      Lungs:  Clear to ausculation bilaterally. No rhonchi, crackles, wheezes. Heart:  Regular rate and rhythm. No rub or murmur. .    Abdomen:  Soft, non-tender. No masses, organomegaly. Musculoskeletal: No joint effusions, tenderness swelling or warmth. Neuro: Speech is intact. Moving all extremities. No focal motor or sensory deficits. Extremities:  Both feet are warm to touch. The color of both feet is normal.                                    Pulses Right  Left    Brachial 3 3    Radial    3=normal   Femoral 2 2  2=diminished   Popliteal    1=barely palpable   Dorsalis pedis 1-2 1-2  0=absent   Posterior tibial    4=aneurysmal           Other pertinent information:1. The past medical records were reviewed. 2.    Lab Results   Component Value Date    WBC 14.2 (H) 07/07/2020    HGB 11.7 07/07/2020    HCT 35.6 07/07/2020    MCV 79.8 (L) 07/07/2020     07/07/2020      Lab Results   Component Value Date     07/07/2020    K 4.3 07/07/2020    CL 94 (L) 07/07/2020    CO2 23 07/07/2020    BUN 9 07/07/2020    CREATININE 1.0 07/07/2020    GLUCOSE 352 (H) 07/07/2020    CALCIUM 8.7 07/07/2020    PROT 7.6 07/07/2020    LABALBU 3.4 (L) 07/07/2020    BILITOT 0.6 07/07/2020    ALKPHOS 147 (H) 07/07/2020    AST 27 07/07/2020    ALT 16 07/07/2020    LABGLOM >60 07/07/2020    GFRAA >60 07/07/2020     No results found for: APTT   Lab Results   Component Value Date    INR 1.2 05/01/2017    PROTIME 13.2 (H) 05/01/2017        3. VL LOWER EXTREMITY ARTERIAL SEGMENTAL PRESSURES W PPG BILATERAL   Final Result      MRI FOOT RIGHT WO CONTRAST   Final Result      Cellulitis of the mid and distal foot with 12 x 10 x 5 mm abscess   between the second and third metatarsal heads on the dorsal surface of   foot. No evidence to confirm osteomyelitis. XR FOOT RIGHT (MIN 3 VIEWS)   Final Result      1. No radiographic evidence of osteomyelitis. 2. No fracture or dislocation.                   XR CHEST PORTABLE   Final Result   No acute staff, counseled her regarding modification of risk factors including better control of blood glucose levels, weight and diet modification, and management of lipids if they are elevated    Also recommend low-dose aspirin and a trial of Pletal hopefully help microcirculation    Patient was informed, based upon artery Doppler study, I would not recommend any vascular intervention as she has good arterial flow all the way up to the ankle and the metatarsal area bilaterally    Patient has multiple questions regarding the surgical plans by the podiatry service, she was advised to discuss with Dr. Adenike Villalobos    All her questions were answered        Thank you for letting me participate in the care of your patient        Electronically signed by David Lobo MD on 7/8/2020 at 7:02 PM     Addendum:    Discussed with Dr. Adenike Villalobos, patient was found to have abscess between the second and third toes of the left foot, with clinical evidence of possible osteomyelitis, if the bone biopsy confirms osteomyelitis or further evaluation reveals evidence of Jimenez grade 3 diabetic foot ulcer, patient may benefit from hyperbaric oxygen therapy treatment, will wait for the next few days after the surgery for the third toe regarding the status of the foot and then make additional recommendations    In the interim, we will have the patient go to the hyperbaric ox therapy treatment room for tentative evaluation    David Lobo

## 2020-07-08 NOTE — PROGRESS NOTES
[address] [place] [address] [facility]  Progress Note    SUBJECTIVE:  Chief Complaint   Patient presents with    Abdominal Pain     RUQ pain, onset a couple of hours ago, with nausea. states had similar issue sometime in the alst couple months and was seen in the ER for it but states she was never told what it was    Dizziness     onset at the same time as her abd pain     Patient is seen and examined at bedside. She is awake and alert. Patient is tolerating medications. No reported adverse drug reactions. No nausea, vomiting, diarrhea. Afebrile    Review of systems:  As stated above in the chief complaint, otherwise negative.     Medications:  Scheduled Meds:   insulin lispro  0-18 Units Subcutaneous TID WC    insulin lispro  0-9 Units Subcutaneous Nightly    sodium chloride  1,000 mL Intravenous Once    metoclopramide  5 mg Oral 4x Daily AC & HS    gabapentin  600 mg Oral BID WC    pantoprazole  40 mg Oral QAM AC    insulin glargine  25 Units Subcutaneous Nightly    lidocaine  5 mL Intradermal Once    heparin flush  1 mL Intravenous 2 times per day    famotidine  20 mg Oral BID    piperacillin-tazobactam  3.375 g Intravenous Q8H    And    sodium chloride   Intravenous Q8H    enoxaparin  40 mg Subcutaneous Daily    sodium chloride flush  10 mL Intravenous 2 times per day    sodium chloride  500 mL Intravenous Once     Continuous Infusions:   dextrose       PRN Meds:ibuprofen **AND** acetaminophen, sodium chloride flush, heparin flush, sodium chloride flush, acetaminophen **OR** acetaminophen, polyethylene glycol, promethazine **OR** ondansetron, glucose, dextrose, glucagon (rDNA), dextrose    OBJECTIVE:  BP (!) 109/57   Pulse 77   Temp 96.9 °F (36.1 °C) (Temporal)   Resp 18   Ht 5' 2\" (1.575 m)   Wt 259 lb 6.4 oz (117.7 kg)   SpO2 98%   BMI 47.44 kg/m²   Temp  Av °F (36.1 °C)  Min: 96.9 °F (36.1 °C)  Max: 97.2 °F (36.2 °C)  Constitutional: The patient is awake, alert, and oriented. Skin: Warm and dry. No rashes were noted. HEENT: Round and reactive pupils. Moist mucous membranes. No ulcerations or thrush. Neck: Supple to movements. Chest: No use of accessory muscles to breathe. Symmetrical expansion. No wheezing, crackles or rhonchi. Cardiovascular: S1 and S2 are rhythmic and regular. No murmurs appreciated. Abdomen: Positive bowel sounds to auscultation. Benign to palpation. No masses felt. No hepatosplenomegaly.   Extremities: Right foot dressed with dressing--- full range of motion all 5 toes; right foot third toe cyanotic  Neurological: [place] on the right and neuropathy  Lines:Midline    Laboratory and Tests Review:  Lab Results   Component Value Date    WBC 14.2 (H) 07/07/2020    WBC 12.6 (H) 07/06/2020    WBC 10.4 07/05/2020    HGB 11.7 07/07/2020    HCT 35.6 07/07/2020    MCV 79.8 (L) 07/07/2020     07/07/2020     Lab Results   Component Value Date    NEUTROABS 11.84 (H) 07/07/2020    NEUTROABS 9.29 (H) 07/06/2020    NEUTROABS 7.54 (H) 07/05/2020     No results found for: [facility]  Lab Results   Component Value Date    ALT 16 07/07/2020    AST 27 07/07/2020    ALKPHOS 147 (H) 07/07/2020    BILITOT 0.6 07/07/2020     Lab Results   Component Value Date     07/07/2020    K 4.3 07/07/2020    CL 94 07/07/2020    CO2 23 07/07/2020    BUN 9 07/07/2020    CREATININE 1.0 07/07/2020    CREATININE 1.0 07/06/2020    CREATININE 1.0 07/05/2020    GFRAA >60 07/07/2020    LABGLOM >60 07/07/2020    GLUCOSE 352 07/07/2020    PROT 7.6 07/07/2020    LABALBU 3.4 07/07/2020    CALCIUM 8.7 07/07/2020    BILITOT 0.6 07/07/2020    ALKPHOS 147 07/07/2020    AST 27 07/07/2020    ALT 16 07/07/2020     Lab Results   Component Value Date    CRP 7.7 (H) 07/05/2020    CRP 5.6 (H) 06/27/2020    CRP 1.8 (H) 03/06/2020     Lab Results   Component Value Date    SEDRATE 96 (H) 07/05/2020    SEDRATE 97 (H) 06/27/2020    SEDRATE 46 (H) 03/06/2020       Radiology:  Noted    Microbiology: Blood culture 7/5/2020 20-24 hours no growth  Urine culture 7/5/2020-mixed omi with gram-positive's and gram-negative rods. Assessment:  · Soft tissue abscess/ deep space infection right foot second third toe status post right foot incision and drainage of multiple compartments and right second toe bone biopsy  · Leukocytosis  · Cyanotic-- right foot third toe     Plan:    · Cont Zosyn  (will need at least 3 weeks of IV antibiotics  · Vanco x1 dose ON 07/07/2020  · Check cultures from 7/7/2020  · Follow surgical pathology from 7/7/2020  · Baseline CRP-7.7, sedimentation rate- 96  · Midline placed 7/7/2020  · Monitor labs--CBC with differential, CMP ordered for tomorrow morning  · Podiatry following  · Will follow with you       LAUREN Lomas - CNP   7/8/2020   Pt seen and examined. Above discussed agree with advanced practice nurse. Labs, cultures, and radiographs reviewed. Face to Face encounter occurred. Changes made as necessary.      Chico Rios MD

## 2020-07-08 NOTE — PLAN OF CARE
Problem: Pain:  Goal: Pain level will decrease  Description: Pain level will decrease  Outcome: Met This Shift     Problem: Pain:  Goal: Control of acute pain  Description: Control of acute pain  Outcome: Met This Shift     Problem: Falls - Risk of:  Goal: Will remain free from falls  Description: Will remain free from falls  Outcome: Met This Shift

## 2020-07-08 NOTE — PROGRESS NOTES
[facility]  Internal Medicine Clinic     Attending Physician Statement:  Adan Prader, M.D., F.A.C.P.     I have discussed the case, including pertinent history and exam findings with the resident/NP. I have seen and examined the patient and the key elements of the encounter have been performed by me. I agree with the resident ROS, PMHx, PSHx, meds reviewed and assessment, plan and orders as documented by the resident/NP    [facility] charts reviewed, including other providers notes, relevant labs and imaging. dm2- gastsroparesis likley - chronic recurrent abd pain  Trial reglan- seems helpful  -- rule out doxy assoc upset stomach  -- milid DKA treated   -- no more vomitus-- once daily phenergan averaging  victoza resume then adjust BS once eating normally    Diabetic foot ulcer,ID zosyn and vanco-- OR by today  podiatry  Inc WBC- infected rule out osteo, doubt ? UTI   Chronic adrenal inssuficency?/  Workup in march, for autoimmune adrenalitis-- borderline testing done at that time. -- plan to repeat   being done now (but unfortunately got hydocortizone last night)    Still getting only minimal IV fluids- due ot access\"dry\"  +orthosatitics-- intravascular volume depleted, adrenal insufficiency and autonomic insufficiency.     +PVD- toe pressures low  MRI +  For OR  Cellulitis of the mid and distal foot with 12 x 10 x 5 mm abscess  between the second and third metatarsal heads on the dorsal surface of foot. No evidence to confirm osteomyelitis   (bone culture neg gram stain)    Zosyn - midline IV  >50% of time spent coordinating care with other providers and/or counseling patient/family  Remainder of medical problems as per resident note.

## 2020-07-08 NOTE — PROGRESS NOTES
[facility]  Internal Medicine Residency Program  Progress Note - House Team 2    Patient:  Gail Amador 37 y.o. female MRN: [id number]     Date of Service: 7/8/2020     CC: had concerns including Abdominal Pain (RUQ pain, onset a couple of hours ago, with nausea. states had similar issue sometime in the alst couple months and was seen in the ER for it but states she was never told what it was) and Dizziness (onset at the same time as her abd pain). Overnight events: Rt foot pain. Received Toradol 30 mg x 1  Hospital day: 3    Subjective     S/p I&D yesterday by podiatry. Patient tolerated well. Surgical culture grew strep agalactiae. Cosyntropin stimulation test this AM.   On Zosyn. Afebrile  Blood sugars 390. Objective     Physical Exam:  · Vitals: BP (!) 109/57   Pulse 77   Temp 96.9 °F (36.1 °C) (Temporal)   Resp 18   Ht 5' 2\" (1.575 m)   Wt 259 lb 6.4 oz (117.7 kg)   SpO2 98%   BMI 47.44 kg/m²     · I & O - 24hr: No intake/output data recorded. · General Appearance: alert, appears stated age, cooperative, moderate distress and morbidly obese  · HEENT:  Head: Normal, normocephalic, atraumatic. · Neck: no adenopathy and supple, symmetrical, trachea midline  · Lung: clear to auscultation bilaterally  · Heart: regular rate and rhythm, S1, S2 normal, no murmur, click, rub or gallop  · Abdomen: soft, non-tender; bowel sounds normal; no masses,  no organomegaly, obese. · Extremities:  Rt 2nd toe swollen with an ulcer; no discharge or signs of infection. · Musculokeletal: No joint swelling, no muscle tenderness. ROM normal in all joints of extremities.    · Neurologic: Mental status: Alert, oriented, thought content appropriate  Subject  Pertinent Labs & Imaging Studies   winnie  CBC:   Lab Results   Component Value Date    WBC 14.2 07/07/2020    RBC 4.46 07/07/2020    HGB 11.7 07/07/2020    HCT 35.6 07/07/2020    MCV 79.8 07/07/2020    MCH 26.2 07/07/2020    MCHC 32.9 2020    RDW 12.9 2020     2020    MPV 11.3 2020     CMP:    Lab Results   Component Value Date     2020    K 4.3 2020    CL 94 2020    CO2 23 2020    BUN 9 2020    CREATININE 1.0 2020    GFRAA >60 2020    LABGLOM >60 2020    GLUCOSE 352 2020    PROT 7.6 2020    LABALBU 3.4 2020    CALCIUM 8.7 2020    BILITOT 0.6 2020    ALKPHOS 147 2020    AST 27 2020    ALT 16 2020     U/A:  No components found for: Tawnya Ano, USPGRAV, UPH, UPROTEIN, UGLUCOSE, UKETONE, UBILI, UBLOOD, [place], [place], [place], [facility], [place], [place], [place], [facility], [place]     Imaging Studies:      Xr Foot Right (2 Views)     Result Date: 2020  Patient MRN:  [id number] : 1985 Age: 37 years Gender: Female Order Date:  2020 5:00 PM EXAM: XR FOOT RIGHT (2 VIEWS) NUMBER OF IMAGES:  2 views INDICATION: Diabetic ulceration right second toe COMPARISON: Previous right foot studies May 2, 2020 and 2019 FINDINGS: Bony structures are intact with preservation of alignment and joint spacing. No acute skeletal pathology is noted plantar and Achilles traction spurs of the calcaneus are evident. Soft tissues about the second toe are prominent, and there is some decreased density in the webspace between the second and third toe, without well-defined gas. There is diffuse forefoot soft tissue swelling on the lateral projection on the dorsal and plantar aspect.      Swelling and decreased density in the forefoot, with some prominence along the second toe and second-third webspace.  Is nonspecific, and if further imaging is felt to BE clinically necessary, noncontrast MRI can identify marrow edema of osteomyelitis as well as localized soft tissue pathology such as abscess formation.     Ct Abdomen Pelvis W Iv Contrast Additional Contrast? None     Result Date: 2020  Patient MRN:  [id number] : 1985 Age: 37 years Gender: Female Order Date:  2020 6:30 AM EXAM: CT ABDOMEN PELVIS W IV CONTRAST INDICATION:  Epigastric / LUQ abdominal pain Epigastric / LUQ abdominal pain  COMPARISON: CT of the abdomen and pelvis, 2020 Technique: Low-dose CT  acquisition technique included one of following options; 1 . Automated exposure control, 2. Adjustment of MA and or KV according to patient's size or 3. Use of iterative reconstruction. Multiple computerized tomography sections of the abdomen and pelvis with sagittal and coronal MPR reconstructions were obtained from the top of the diaphragm to the pelvis. FINDINGS: LOWER THORAX: Unremarkable. LIVER: Mildly enlarged. The abdomen is partially excluded from the field-of-view. No focal lesion or ductal dilatation. Collette Ruts GALLBLADDER: Surgically absent. SPLEEN:Unremarkable. ADRENALS:Unremarkable. KIDNEYS:Unremarkable. PANCREAS:Unremarkable. BOWEL:Unremarkable. APPENDIX:Unremarkable. BLADDER: Prominently distended, with the dome at the level of the umbilicus. REPRODUCTIVE ORGANS:Unremarkable. VASCULATURE:No aortic aneurysm. LYMPH NODES: Mild prominence of the right inguinal lymph nodes. BONES:Unremarkable. MISCELLANEOUS:No additional finding.      1. Prominently distended urinary bladder dome at the level of the umbilicus. Please correlate clinically as to the need for catheterization. 2. Prominence of the right inguinal lymph nodes. A reactive etiology is favored. Clinical correlation is needed. 3. Mild hepatomegaly. Narrative   Patient MRN: [id number]   : 1985   Age:  36 years   Gender: Female   Order Date: 2020 3:30 PM   Exam: XR CHEST PORTABLE   Indication:   PNA    PNA    Comparison: Chest radiographs, 3/5/2020        FINDINGS:       The lungs are clear. The cardiomediastinal contour is unremarkable. No   pneumothorax or pleural effusion.  Evaluation of the bones reveals no   fracture or destructive lesion.           Impression   No acute cardiopulmonary abnormality. Narrative   Comparison:       X-rays of the right foot July 5, 2020.       Clinical indications:       Patient is unable to flex toes. Sore on second digit of right foot. Osteomyelitis.       TECHNIQUE:       Multiplanar multisequence MRI of the right foot was performed without   contrast.       There is considerable decreased T1 and increased T2 signal within the   soft tissues of the dorsum of the midfoot extending into the mid and   distal foot as well. A 12 x 10 x 5 mm fluid collection along the   dorsal surface of the foot wedged between the second and third   metatarsal heads which might represent a small abscess. This   examination is performed without contrast. There is loss of field   homogeneity on the sagittal views. There is no definite abnormal   signal intensity within the osseous structures to suggest   osteomyelitis.           Impression       Cellulitis of the mid and distal foot with 12 x 10 x 5 mm abscess   between the second and third metatarsal heads on the dorsal surface of   foot.       No evidence to confirm osteomyelitis.                     Resident's Assessment and Plan     Dai Dane is a 37 y.o. female with PMHx of IDDM & Adrenal Insufficiency presenting with LUQ abdominal pain, nausea, & vomiting with hyperglycemia/DKA and Rt DM foot ulcer. 1. Abdominal Pain- 2/2 ?inflammatory bowel disease vs Gastroparesis vs Chronic Adrenal Insuffiency vs Autoimmune bowel disease (Crohn's/Celiac) vs Doxycycline. Epigastric, LUQ/LLQ, pain after large meals. Elevated ESR/CRP. CAT scan (5-29-20)- small bowel thickening w/i jejunal loops which are mildly dilated; Ileal loops dilated w/o wall thickening; findings most likely reflective of enteritis/Crohn's.  Repeat CAT scan (7-5-20): Prominently distended urinary bladder dome; Reactive inguinal LN; mild hepatomegaly.    -Tylenol prn for pain   -Phenergan or zofran prn for n/v    -Reglan for

## 2020-07-08 NOTE — PROGRESS NOTES
Department of Podiatry  Progress Note    SUBJECTIVE:  Patient seen bedside for s/p incision and drainage of right foot and right 2nd toe bone biopsy (DOS: 7/7/20, Dr. Lindsay Sen). No acute events overnight. Patient relates she feels much better since admission. Patient denies any N/V/D/F/C/SOB/CP and has no other pedal complaints at this time. OBJECTIVE:    Scheduled Meds:   insulin lispro  0-18 Units Subcutaneous TID WC    insulin lispro  0-9 Units Subcutaneous Nightly    sodium chloride  1,000 mL Intravenous Once    metoclopramide  5 mg Oral 4x Daily AC & HS    gabapentin  600 mg Oral BID WC    pantoprazole  40 mg Oral QAM AC    insulin glargine  25 Units Subcutaneous Nightly    lidocaine  5 mL Intradermal Once    heparin flush  1 mL Intravenous 2 times per day    famotidine  20 mg Oral BID    piperacillin-tazobactam  3.375 g Intravenous Q8H    And    sodium chloride   Intravenous Q8H    enoxaparin  40 mg Subcutaneous Daily    sodium chloride flush  10 mL Intravenous 2 times per day    sodium chloride  500 mL Intravenous Once     Continuous Infusions:   dextrose       PRN Meds:.ibuprofen **AND** acetaminophen, sodium chloride flush, heparin flush, sodium chloride flush, acetaminophen **OR** acetaminophen, polyethylene glycol, promethazine **OR** ondansetron, glucose, dextrose, glucagon (rDNA), dextrose    Allergies   Allergen Reactions    Rocephin [Ceftriaxone] Rash       BP (!) 109/57   Pulse 77   Temp 96.9 °F (36.1 °C) (Temporal)   Resp 18   Ht 5' 2\" (1.575 m)   Wt 259 lb 6.4 oz (117.7 kg)   SpO2 98%   BMI 47.44 kg/m²       RIGHT LOWER EXTREMITY EXAM:    VASCULAR:  DP and PT pulses are palpable. CFT is delayed to right 3rd toe. CFT < 5 seconds B/L to digits 1,2,4, and 5. Warm to warm from the tibial tuberosity to the distal aspect of the digits dorsally. 3rd toe is cool to touch.      NEUROLOGIC:  Protective sensation is diminished but grossly intact    DERM:  Open surgical incision site noted, proximal and distal retention sutures intact. 3rd digit appears cyanotic. No purulence expressed from incision site. De-roofed bulla noted to surface of 3rd digit. Suture intact at site of bone biopsy on dorsal aspect of 2nd digit. Edema noted to 2nd and 3rd digits of right foot. Sanguinous drainage expressed. MUSCULOSKELETAL:  Mild pain to palpation of right foot. 5/5 Gross Muscle strength in all 4 quadrants. Scheduled Meds:   insulin lispro  0-18 Units Subcutaneous TID WC    insulin lispro  0-9 Units Subcutaneous Nightly    sodium chloride  1,000 mL Intravenous Once    metoclopramide  5 mg Oral 4x Daily AC & HS    gabapentin  600 mg Oral BID WC    pantoprazole  40 mg Oral QAM AC    insulin glargine  25 Units Subcutaneous Nightly    lidocaine  5 mL Intradermal Once    heparin flush  1 mL Intravenous 2 times per day    famotidine  20 mg Oral BID    piperacillin-tazobactam  3.375 g Intravenous Q8H    And    sodium chloride   Intravenous Q8H    enoxaparin  40 mg Subcutaneous Daily    sodium chloride flush  10 mL Intravenous 2 times per day    sodium chloride  500 mL Intravenous Once     Continuous Infusions:   dextrose       PRN Meds:.ibuprofen **AND** acetaminophen, sodium chloride flush, heparin flush, sodium chloride flush, acetaminophen **OR** acetaminophen, polyethylene glycol, promethazine **OR** ondansetron, glucose, dextrose, glucagon (rDNA), dextrose    RADIOLOGY:  VL LOWER EXTREMITY ARTERIAL SEGMENTAL PRESSURES W PPG BILATERAL   Final Result      MRI FOOT RIGHT WO CONTRAST   Final Result      Cellulitis of the mid and distal foot with 12 x 10 x 5 mm abscess   between the second and third metatarsal heads on the dorsal surface of   foot. No evidence to confirm osteomyelitis. XR FOOT RIGHT (MIN 3 VIEWS)   Final Result      1. No radiographic evidence of osteomyelitis. 2. No fracture or dislocation.                   XR CHEST PORTABLE   Final Result

## 2020-07-08 NOTE — OP NOTE
[address]                  [address] [facility],  [facility]                                OPERATIVE REPORT    PATIENT NAME: Alyson David           :        1985  MED REC NO:   [id number]                            ROOM:       8208  ACCOUNT NO:   [de-identified]                           ADMIT DATE: 2020  PROVIDER:     Celine Hagan DPM    DATE OF PROCEDURE:  2020    PREOPERATIVE DIAGNOSES:  1. Right foot cellulitis. 2.  Questionable right second toe acute osteomyelitis. POSTOPERATIVE DIAGNOSES:  1. Right foot cellulitis. 2.  Questionable right second toe acute osteomyelitis. PROCEDURES:  1. Right foot incision and drainage of multiple compartments. 2.  Right second toe bone biopsy. SURGEON:  Celine Hagan DPM    ASSISTANT:  Lisbeth Kimble, PGY-1    SECOND ASSISTANT:  Dr. Elder Neri    ANESTHESIA:  Monitored anesthesia care. INJECTABLE:  12 mL of 0.5% Marcaine plain. ESTIMATED BLOOD LOSS:  Minimal.    HEMOSTASIS:  On the field. SPECIMEN:  Right second toe bone and right second toe abscess. INTRAOPERATIVE FINDINGS:  Approximately 10 mL of purulent abscess  between second and third metatarsals distally, also noted between the  proximal second and third toes. INDICATIONS:  This is a pleasant 78-year-old female, who was admitted  secondary to right foot cellulitis with concern of an infected second  toe. The patient had been counseled on the nature of the problem, the  proposed course of procedure, potential benefits, risks, complications,  and convalescence in detail. All questions answered to her apparent  satisfaction. No guarantees given as to the outcome of the procedure. Preoperative MRI was ordered confirming concern over second interspace  abscess. Preoperative arterial studies were ordered. No major arterial  insufficiency is noted.   However, there is concern over decrease of  pulse volume recording at the level of the toes secondary to the  infection. DESCRIPTION OF PROCEDURE:  Under mild sedation, the patient was brought  to the operating room and placed on the operating table in supine  position. The right lower extremity was scrubbed, prepped, and draped  in the usual sterile fashion. At this time, using a #10 blade, I  performed dorsal incision between the right second and third toes,  extending it proximally to the distal aspect of the metatarsals. This  was taken to the subcutaneous tissue, bleeders ligated as appropriate. I opened up the compartment distally. At this point, I noted increase  of purulence, noted approximately 10 mL of seropurulent abscess. I also  extended the incision slightly more distally into the webspace to open  up the compartment also plantarly which at this point could not  appreciate further. I did have approximately another milliliter of  seropurulent abscess. Abscess was then cultured at this time. Next, I  irrigated the area with copious amounts of normal sterile incision. Next, at this point, using #15 blade, I performed a stab incision on the  dorsal aspect of the right second toe at the proximal phalanx. At this  point, using Jamshidi needle, I was able to perform the bone biopsy  extracting cortical medullary bone from the trocar. Bone was sent off  for pathological microbiology examination. At this point, I closed that  incision using 3-0 nylon suture. Applied the retention suture dorsally  using 3-0 nylon suture at this point. Next, packing Nu Gauze dressing  was then applied, 0.25 inch. Postoperative bandage was then applied. The patient tolerated the procedure and anesthesia well in apparent  satisfactory condition with vital signs stable and vascular status  intact to the right lower extremity. The patient was transferred to the  PACU for further monitoring prior to readmission to the nursing floor.         Eileen Emerson DPM    D: 07/07/2020 18:39:42       T: 07/07/2020 23:26:05     [email or web address]  Job#: [id number]     Doc#: [id number]    CC:

## 2020-07-08 NOTE — PLAN OF CARE
Noted that toe/bluer-- more dry gangrene   - plan vacular possible toe amputation- noted PVD poor to toes    Noted osteo is still possibility -- bone culture (and tissue both) did show strep aglactia      Cosyntropin test-- shows a possible blunted reponse  60-30-0 min response below   12.97  10.43  5.86      I wouldn't label her \"full\" adrenal insufficiency (but partial mabye if under severe stress or shock)    I feel PO daily steroids, may NOT be necessary especially if raising BS in dm2/obese-- maybe 5-10mg prednisone daily only if STRESS situation?

## 2020-07-09 LAB
ALBUMIN SERPL-MCNC: 2.7 G/DL (ref 3.5–5.2)
ALP BLD-CCNC: 114 U/L (ref 35–104)
ALT SERPL-CCNC: 10 U/L (ref 0–32)
ANAEROBIC CULTURE: NORMAL
ANAEROBIC CULTURE: NORMAL
ANION GAP SERPL CALCULATED.3IONS-SCNC: 11 MMOL/L (ref 7–16)
AST SERPL-CCNC: 12 U/L (ref 0–31)
BASOPHILS ABSOLUTE: 0.09 E9/L (ref 0–0.2)
BASOPHILS RELATIVE PERCENT: 1 % (ref 0–2)
BILIRUB SERPL-MCNC: 0.3 MG/DL (ref 0–1.2)
BUN BLDV-MCNC: 12 MG/DL (ref 6–20)
CALCIUM SERPL-MCNC: 8 MG/DL (ref 8.6–10.2)
CHLORIDE BLD-SCNC: 100 MMOL/L (ref 98–107)
CO2: 24 MMOL/L (ref 22–29)
CREAT SERPL-MCNC: 1.1 MG/DL (ref 0.5–1)
CULTURE SURGICAL: ABNORMAL
CULTURE SURGICAL: ABNORMAL
EOSINOPHILS ABSOLUTE: 0.24 E9/L (ref 0.05–0.5)
EOSINOPHILS RELATIVE PERCENT: 2.7 % (ref 0–6)
GFR AFRICAN AMERICAN: >60
GFR NON-AFRICAN AMERICAN: 56 ML/MIN/1.73
GLUCOSE BLD-MCNC: 374 MG/DL (ref 74–99)
HCT VFR BLD CALC: 29.6 % (ref 34–48)
HEMOGLOBIN: 9.8 G/DL (ref 11.5–15.5)
IMMATURE GRANULOCYTES #: 0.06 E9/L
IMMATURE GRANULOCYTES %: 0.7 % (ref 0–5)
LYMPHOCYTES ABSOLUTE: 2.18 E9/L (ref 1.5–4)
LYMPHOCYTES RELATIVE PERCENT: 24.3 % (ref 20–42)
MAGNESIUM: 2 MG/DL (ref 1.6–2.6)
MCH RBC QN AUTO: 26.6 PG (ref 26–35)
MCHC RBC AUTO-ENTMCNC: 33.1 % (ref 32–34.5)
MCV RBC AUTO: 80.2 FL (ref 80–99.9)
METER GLUCOSE: 138 MG/DL (ref 74–99)
METER GLUCOSE: 251 MG/DL (ref 74–99)
METER GLUCOSE: 304 MG/DL (ref 74–99)
METER GLUCOSE: 398 MG/DL (ref 74–99)
METER GLUCOSE: 422 MG/DL (ref 74–99)
MONOCYTES ABSOLUTE: 0.57 E9/L (ref 0.1–0.95)
MONOCYTES RELATIVE PERCENT: 6.4 % (ref 2–12)
NEUTROPHILS ABSOLUTE: 5.82 E9/L (ref 1.8–7.3)
NEUTROPHILS RELATIVE PERCENT: 64.9 % (ref 43–80)
ORGANISM: ABNORMAL
ORGANISM: ABNORMAL
PDW BLD-RTO: 13.1 FL (ref 11.5–15)
PHOSPHORUS: 2.5 MG/DL (ref 2.5–4.5)
PLATELET # BLD: 269 E9/L (ref 130–450)
PMV BLD AUTO: 12 FL (ref 7–12)
POTASSIUM REFLEX MAGNESIUM: 3.7 MMOL/L (ref 3.5–5)
POTASSIUM SERPL-SCNC: 3.7 MMOL/L (ref 3.5–5)
RBC # BLD: 3.69 E12/L (ref 3.5–5.5)
SODIUM BLD-SCNC: 135 MMOL/L (ref 132–146)
TOTAL PROTEIN: 6.6 G/DL (ref 6.4–8.3)
WBC # BLD: 9 E9/L (ref 4.5–11.5)

## 2020-07-09 PROCEDURE — 6370000000 HC RX 637 (ALT 250 FOR IP): Performed by: INTERNAL MEDICINE

## 2020-07-09 PROCEDURE — 99232 SBSQ HOSP IP/OBS MODERATE 35: CPT | Performed by: INTERNAL MEDICINE

## 2020-07-09 PROCEDURE — 83519 RIA NONANTIBODY: CPT

## 2020-07-09 PROCEDURE — 6370000000 HC RX 637 (ALT 250 FOR IP): Performed by: STUDENT IN AN ORGANIZED HEALTH CARE EDUCATION/TRAINING PROGRAM

## 2020-07-09 PROCEDURE — 6370000000 HC RX 637 (ALT 250 FOR IP): Performed by: SURGERY

## 2020-07-09 PROCEDURE — 82962 GLUCOSE BLOOD TEST: CPT

## 2020-07-09 PROCEDURE — 36415 COLL VENOUS BLD VENIPUNCTURE: CPT

## 2020-07-09 PROCEDURE — 2580000003 HC RX 258: Performed by: INTERNAL MEDICINE

## 2020-07-09 PROCEDURE — 85025 COMPLETE CBC W/AUTO DIFF WBC: CPT

## 2020-07-09 PROCEDURE — 80053 COMPREHEN METABOLIC PANEL: CPT

## 2020-07-09 PROCEDURE — 6370000000 HC RX 637 (ALT 250 FOR IP): Performed by: PODIATRIST

## 2020-07-09 PROCEDURE — 83735 ASSAY OF MAGNESIUM: CPT

## 2020-07-09 PROCEDURE — 2580000003 HC RX 258: Performed by: PODIATRIST

## 2020-07-09 PROCEDURE — 6360000002 HC RX W HCPCS: Performed by: PODIATRIST

## 2020-07-09 PROCEDURE — 2140000000 HC CCU INTERMEDIATE R&B

## 2020-07-09 PROCEDURE — 84100 ASSAY OF PHOSPHORUS: CPT

## 2020-07-09 PROCEDURE — 6360000002 HC RX W HCPCS: Performed by: INTERNAL MEDICINE

## 2020-07-09 RX ORDER — INSULIN GLARGINE 100 [IU]/ML
35 INJECTION, SOLUTION SUBCUTANEOUS NIGHTLY
Status: DISCONTINUED | OUTPATIENT
Start: 2020-07-09 | End: 2020-07-11

## 2020-07-09 RX ORDER — INSULIN GLARGINE 100 [IU]/ML
30 INJECTION, SOLUTION SUBCUTANEOUS NIGHTLY
Status: DISCONTINUED | OUTPATIENT
Start: 2020-07-09 | End: 2020-07-09

## 2020-07-09 RX ORDER — INSULIN GLARGINE 100 [IU]/ML
15 INJECTION, SOLUTION SUBCUTANEOUS ONCE
Status: COMPLETED | OUTPATIENT
Start: 2020-07-09 | End: 2020-07-09

## 2020-07-09 RX ADMIN — Medication 10 ML: at 08:48

## 2020-07-09 RX ADMIN — METOCLOPRAMIDE 5 MG: 5 TABLET ORAL at 21:15

## 2020-07-09 RX ADMIN — IBUPROFEN 400 MG: 400 TABLET, FILM COATED ORAL at 17:13

## 2020-07-09 RX ADMIN — METOCLOPRAMIDE 5 MG: 5 TABLET ORAL at 17:13

## 2020-07-09 RX ADMIN — FAMOTIDINE 20 MG: 20 TABLET ORAL at 08:47

## 2020-07-09 RX ADMIN — INSULIN LISPRO 5 UNITS: 100 INJECTION, SOLUTION INTRAVENOUS; SUBCUTANEOUS at 11:50

## 2020-07-09 RX ADMIN — PIPERACILLIN AND TAZOBACTAM 3.38 G: 3; .375 INJECTION, POWDER, FOR SOLUTION INTRAVENOUS at 08:48

## 2020-07-09 RX ADMIN — METOCLOPRAMIDE 5 MG: 5 TABLET ORAL at 06:57

## 2020-07-09 RX ADMIN — GABAPENTIN 600 MG: 300 CAPSULE ORAL at 08:46

## 2020-07-09 RX ADMIN — CILOSTAZOL 100 MG: 100 TABLET ORAL at 21:15

## 2020-07-09 RX ADMIN — IBUPROFEN 400 MG: 400 TABLET, FILM COATED ORAL at 08:47

## 2020-07-09 RX ADMIN — PANTOPRAZOLE SODIUM 40 MG: 40 TABLET, DELAYED RELEASE ORAL at 06:57

## 2020-07-09 RX ADMIN — INSULIN GLARGINE 35 UNITS: 100 INJECTION, SOLUTION SUBCUTANEOUS at 21:21

## 2020-07-09 RX ADMIN — FAMOTIDINE 20 MG: 20 TABLET ORAL at 21:15

## 2020-07-09 RX ADMIN — INSULIN GLARGINE 15 UNITS: 100 INJECTION, SOLUTION SUBCUTANEOUS at 08:46

## 2020-07-09 RX ADMIN — INSULIN LISPRO 15 UNITS: 100 INJECTION, SOLUTION INTRAVENOUS; SUBCUTANEOUS at 08:48

## 2020-07-09 RX ADMIN — SODIUM CHLORIDE: 9 INJECTION, SOLUTION INTRAVENOUS at 21:14

## 2020-07-09 RX ADMIN — PIPERACILLIN AND TAZOBACTAM 3.38 G: 3; .375 INJECTION, POWDER, FOR SOLUTION INTRAVENOUS at 02:24

## 2020-07-09 RX ADMIN — GABAPENTIN 600 MG: 300 CAPSULE ORAL at 17:13

## 2020-07-09 RX ADMIN — INSULIN LISPRO 5 UNITS: 100 INJECTION, SOLUTION INTRAVENOUS; SUBCUTANEOUS at 17:14

## 2020-07-09 RX ADMIN — INSULIN LISPRO 12 UNITS: 100 INJECTION, SOLUTION INTRAVENOUS; SUBCUTANEOUS at 11:37

## 2020-07-09 RX ADMIN — PIPERACILLIN AND TAZOBACTAM: 3; .375 INJECTION, POWDER, FOR SOLUTION INTRAVENOUS at 17:13

## 2020-07-09 RX ADMIN — METOCLOPRAMIDE 5 MG: 5 TABLET ORAL at 11:35

## 2020-07-09 RX ADMIN — ASPIRIN 81 MG: 81 TABLET, COATED ORAL at 11:48

## 2020-07-09 RX ADMIN — CILOSTAZOL 100 MG: 100 TABLET ORAL at 11:48

## 2020-07-09 RX ADMIN — SODIUM CHLORIDE: 9 INJECTION, SOLUTION INTRAVENOUS at 06:44

## 2020-07-09 RX ADMIN — ACETAMINOPHEN 500 MG: 500 TABLET ORAL at 21:15

## 2020-07-09 ASSESSMENT — PAIN SCALES - GENERAL
PAINLEVEL_OUTOF10: 3
PAINLEVEL_OUTOF10: 3
PAINLEVEL_OUTOF10: 6

## 2020-07-09 ASSESSMENT — PAIN DESCRIPTION - PAIN TYPE: TYPE: ACUTE PAIN

## 2020-07-09 ASSESSMENT — PAIN DESCRIPTION - DESCRIPTORS: DESCRIPTORS: BURNING

## 2020-07-09 ASSESSMENT — PAIN DESCRIPTION - LOCATION: LOCATION: FOOT

## 2020-07-09 ASSESSMENT — PAIN DESCRIPTION - ORIENTATION: ORIENTATION: RIGHT

## 2020-07-09 ASSESSMENT — PAIN DESCRIPTION - FREQUENCY: FREQUENCY: INTERMITTENT

## 2020-07-09 NOTE — PROGRESS NOTES
[address] [place] [address] [facility]  [facility]  Progress Note    SUBJECTIVE:  Chief Complaint   Patient presents with    Abdominal Pain     RUQ pain, onset a couple of hours ago, with nausea. states had similar issue sometime in the alst couple months and was seen in the ER for it but states she was never told what it was    Dizziness     onset at the same time as her abd pain     Patient is seen and examined at bedside. She is awake and alert. Patient is tolerating medications. No reported adverse drug reactions. No nausea, vomiting, diarrhea. Afebrile      Review of systems:  As stated above in the chief complaint, otherwise negative.     Medications:  Scheduled Meds:   insulin lispro  5 Units Subcutaneous TID AC    insulin glargine  35 Units Subcutaneous Nightly    insulin lispro  0-18 Units Subcutaneous TID WC    metoclopramide  5 mg Oral 4x Daily AC & HS    aspirin  81 mg Oral Daily    cilostazol  100 mg Oral BID    gabapentin  600 mg Oral BID WC    pantoprazole  40 mg Oral QAM AC    lidocaine  5 mL Intradermal Once    heparin flush  1 mL Intravenous 2 times per day    famotidine  20 mg Oral BID    piperacillin-tazobactam  3.375 g Intravenous Q8H    And    sodium chloride   Intravenous Q8H    enoxaparin  40 mg Subcutaneous Daily    sodium chloride flush  10 mL Intravenous 2 times per day    sodium chloride  500 mL Intravenous Once     Continuous Infusions:   dextrose       PRN Meds:ibuprofen **AND** acetaminophen, sodium chloride flush, heparin flush, sodium chloride flush, acetaminophen **OR** acetaminophen, polyethylene glycol, promethazine **OR** ondansetron, glucose, dextrose, glucagon (rDNA), dextrose    OBJECTIVE:  BP (!) 106/58   Pulse 92   Temp 97.2 °F (36.2 °C) (Temporal)   Resp 16   Ht 5' 2\" (1.575 m)   Wt 268 lb 6.4 oz (121.7 kg)   SpO2 94%   BMI 49.09 kg/m²   Temp  Av.1 °F (36.2 °C)  Min: 96.9 °F (36.1 °C)  Max: 97.3 °F (36.3 °C)  Constitutional: The patient is awake, alert, and oriented. Skin: Warm and dry. No rashes were noted. HEENT: Round and reactive pupils. Moist mucous membranes. No ulcerations or thrush. Neck: Supple to movements. Chest: No use of accessory muscles to breathe. Symmetrical expansion. No wheezing, crackles or rhonchi. Cardiovascular: S1 and S2 are rhythmic and regular. No murmurs appreciated. Abdomen: Positive bowel sounds to auscultation. Benign to palpation. No masses felt. No hepatosplenomegaly.   Extremities: Right foot dressed with dressing--- full range of motion all 5 toes; right foot third toe cyanotic  Neurological: Footdrop on the right and neuropathy  Lines:Midline    Laboratory and Tests Review:  Lab Results   Component Value Date    WBC 9.0 07/09/2020    WBC 14.2 (H) 07/07/2020    WBC 12.6 (H) 07/06/2020    HGB 9.8 (L) 07/09/2020    HCT 29.6 (L) 07/09/2020    MCV 80.2 07/09/2020     07/09/2020     Lab Results   Component Value Date    NEUTROABS 5.82 07/09/2020    NEUTROABS 11.84 (H) 07/07/2020    NEUTROABS 9.29 (H) 07/06/2020     No results found for: [facility]  Lab Results   Component Value Date    ALT 10 07/09/2020    AST 12 07/09/2020    ALKPHOS 114 (H) 07/09/2020    BILITOT 0.3 07/09/2020     Lab Results   Component Value Date     07/09/2020    K 3.7 07/09/2020    K 3.7 07/09/2020     07/09/2020    CO2 24 07/09/2020    BUN 12 07/09/2020    CREATININE 1.1 07/09/2020    CREATININE 1.0 07/07/2020    CREATININE 1.0 07/06/2020    GFRAA >60 07/09/2020    LABGLOM 56 07/09/2020    GLUCOSE 374 07/09/2020    PROT 6.6 07/09/2020    LABALBU 2.7 07/09/2020    CALCIUM 8.0 07/09/2020    BILITOT 0.3 07/09/2020    ALKPHOS 114 07/09/2020    AST 12 07/09/2020    ALT 10 07/09/2020     Lab Results   Component Value Date    CRP 7.7 (H) 07/05/2020    CRP 5.6 (H) 06/27/2020    CRP 1.8 (H) 03/06/2020     Lab Results   Component Value Date    SEDRATE 96 (H) 07/05/2020    SEDRATE 97 (H) 06/27/2020    SEDRATE 46 (H) 03/06/2020 Radiology:  Noted    Microbiology:   Blood culture 7/5/2020 20-24 hours no growth  Urine culture 7/5/2020-mixed omi with gram-positive's and gram-negative rods. Assessment:  · Soft tissue abscess/ deep space infection right foot second third toe status post right foot incision and drainage of multiple compartments and right second toe bone biopsy  · Leukocytosis--resolved  · Cyanotic-- right foot third toe  (seen by vascular)     Plan:    · Cont Zosyn  (will need at least 3 weeks of IV antibiotics)  · Vanco x1 dose ON 07/07/2020  · Check cultures from 7/7/2020  · Follow surgical pathology from 7/7/2020  · Baseline CRP-7.7, sedimentation rate- 96  · Midline placed 7/7/2020  · Monitor labs--CBC with differential, CMP ordered for tomorrow morning  · Podiatry following--- possible plans for toe amputation tommorrow  · Vascular following  · Will follow with you       LAUREN Pearson - CNP   7/9/2020   Pt seen and examined. Above discussed agree with advanced practice nurse. Labs, cultures, and radiographs reviewed. Face to Face encounter occurred. Changes made as necessary.      Simona Sharpe MD

## 2020-07-09 NOTE — PROGRESS NOTES
[facility]  Internal Medicine Residency / [address]    Attending Physician Statement  I have discussed the case, including pertinent history and exam findings with the resident and the team.  I have seen and examined the patient and the key elements of the encounter have been performed by me. I agree with the assessment, plan and orders as documented by the resident. Case Discussed During AM Rounds   Covering for Dr. Micah Donaldson and House Team 2 today    Plan for intervention tomorrow per Podiatry    Appreciate Vascular recs    No acute complaints today    No significant lightheadedness/dizziness currently   BP remains stable    Hyperglycemia noted and intensification of regimen today     Ischemic right 3rd toe    Vascular/Podiatry following   Plan for intervention per Podiatry     Diabetic Foot ulceration with secondary infection    ID/Podiatry following    Atbs continued   S/P I & D     Insulin Dependent Diabetes- uncontrolled   Insulin intensification this am    Monitor for hypoglycemia     Abdominal pain- ? Gastroparesis- currently controlled   Reglan trial initiated inpatient    ? Adrenal Insufficiency    Hydrocortisone discontinued by House team on 7/8    Monitor off hydrocortisone for now per Dr. Micah Donaldson    Need to consider readministration if symptoms   Possible need for stress dose in stress state in future     Disposition- continue inpatient management     Remainder of medical problems as per resident note.       Herson Hines  Internal Medicine Residency Faculty

## 2020-07-09 NOTE — CARE COORDINATION
7/9 Update CM Note: Patient will be npo after midnight for toe amputation in am. She is continued on iv zosyn q8 to a midline. She is POD 2 of I/D toes/biopsy. They are recommending hyperbaric and patient is to have evaluation completed. She is being started on aspirion/pletal. Wheelchair ordered thru [facility]. She continues to verbalize returning home with her  and children. Will follow.  JUDITH

## 2020-07-09 NOTE — PROGRESS NOTES
osteomyelitis. 2. No fracture or dislocation. XR CHEST PORTABLE   Final Result   No acute cardiopulmonary abnormality. CT ABDOMEN PELVIS W IV CONTRAST Additional Contrast? None   Final Result         1. Prominently distended urinary bladder dome at the level of the   umbilicus. Please correlate clinically as to the need for   catheterization. 2. Prominence of the right inguinal lymph nodes. A reactive etiology   is favored. Clinical correlation is needed. 3. Mild hepatomegaly. BP (!) 106/58   Pulse 92   Temp 97.2 °F (36.2 °C) (Temporal)   Resp 16   Ht 5' 2\" (1.575 m)   Wt 268 lb 6.4 oz (121.7 kg)   SpO2 94%   BMI 49.09 kg/m²     LABS:    Recent Labs     07/07/20  0548 07/09/20  0700   WBC 14.2* 9.0   HGB 11.7 9.8*   HCT 35.6 29.6*    269        Recent Labs     07/09/20  0700      K 3.7  3.7      CO2 24   PHOS 2.5   BUN 12   CREATININE 1.1*        Recent Labs     07/07/20  0548 07/09/20  0700   PROT 7.6 6.6         ASSESSMENT:  -S/P incision and drainage of right foot with bone biopsy of right 2nd digit   -Ischemic 2nd digit   -Osteomyelitis   -Diabetic foot infection   -Elevated ESR  -Diabetes Mellitus (Uncontrolled)      PLAN:  - Patient was examined and evaluated. - Reviewed patient's recent lab results and pertinent diagnostic imaging.  - Reviewed ancillary service notes. - WBC has decreased to 9.0  - Pain Control: IV and PO  - ABX per ID   - Vascular following. Placed on Pletal and considering HBOT. - Surgical cultures reviewed showing heavy growth of strep in tissue and rare growth in bone  - Pathology pending   - Discussed with patient at length about surgery. Informed patient of risks and benefits. Patient verbalized understanding and signed consent.  - Dressing: betadine paint, DSD, ACE.  Daily.  - Plan for toe amputation tomorrow   - Placed NPO after midnight   - Discussed patient with Dr. Anum Urbina   - Will continue to follow patient while they are in-house.     Discussed with Dr. Nilda Sam

## 2020-07-09 NOTE — PROGRESS NOTES
[facility]  Internal Medicine Residency Program  Progress Note - House Team 2    Patient:  Nely Mccormick 37 y.o. female MRN: [id number]     Date of Service: 7/9/2020     CC: had concerns including Abdominal Pain (RUQ pain, onset a couple of hours ago, with nausea. states had similar issue sometime in the alst couple months and was seen in the ER for it but states she was never told what it was) and Dizziness (onset at the same time as her abd pain). Overnight events: Rt foot pain. Received Toradol 30 mg x 1  Hospital day: 3    Subjective     Seen and examined patient in the am. Reports feeling well. appetite is good without abdominal pain . Tolerating diet and doesn't complain of nausea or vomiting. Is not feeling dizzy as well. Objective     Physical Exam:  · Vitals: BP (!) 106/58   Pulse 92   Temp 97.2 °F (36.2 °C) (Temporal)   Resp 16   Ht 5' 2\" (1.575 m)   Wt 268 lb 6.4 oz (121.7 kg)   SpO2 94%   BMI 49.09 kg/m²     · I & O - 24hr: No intake/output data recorded. · General Appearance: alert, appears stated age, cooperative, moderate distress and morbidly obese  · HEENT:  Head: Normal, normocephalic, atraumatic. · Neck: no adenopathy and supple, symmetrical, trachea midline  · Lung: clear to auscultation bilaterally  · Heart: regular rate and rhythm, S1, S2 normal, no murmur, click, rub or gallop  · Abdomen: soft, non-tender; bowel sounds normal; no masses,  no organomegaly, obese. · Extremities:  Right third toe purplish, no pus seen from the I and D site. Pulses on dorsalis pedis on the right foot palpable and good   · Musculokeletal: No joint swelling, no muscle tenderness. ROM normal in all joints of extremities.    · Neurologic: Mental status: Alert, oriented, thought content appropriate  Subject  Pertinent Labs & Imaging Studies   winnie  CBC:   Lab Results   Component Value Date    WBC 9.0 07/09/2020    RBC 3.69 07/09/2020    HGB 9.8 07/09/2020    HCT 29.6 2020    MCV 80.2 2020    MCH 26.6 2020    MCHC 33.1 2020    RDW 13.1 2020     2020    MPV 12.0 2020     CMP:    Lab Results   Component Value Date     2020    K 3.7 2020    K 3.7 2020     2020    CO2 24 2020    BUN 12 2020    CREATININE 1.1 2020    GFRAA >60 2020    LABGLOM 56 2020    GLUCOSE 374 2020    PROT 6.6 2020    LABALBU 2.7 2020    CALCIUM 8.0 2020    BILITOT 0.3 2020    ALKPHOS 114 2020    AST 12 2020    ALT 10 2020     U/A:  No components found for: Niall Reddy, Phil, MICHAEL, Michelle Pereyra, ARMANDO, [address] [place] [address]         Narrative   Patient MRN: [id number]   : 1985   Age:  36 years   Gender: Female   Order Date: 2020 3:30 PM   Exam: XR CHEST PORTABLE   Indication:   PNA    PNA    Comparison: Chest radiographs, 3/5/2020        FINDINGS:       The lungs are clear. The cardiomediastinal contour is unremarkable. No   pneumothorax or pleural effusion. Evaluation of the bones reveals no   fracture or destructive lesion.           Impression   No acute cardiopulmonary abnormality. Narrative   Comparison:       X-rays of the right foot 2020.       Clinical indications:       Patient is unable to flex toes. Sore on second digit of right foot. Osteomyelitis.       TECHNIQUE:       Multiplanar multisequence MRI of the right foot was performed without   contrast.       There is considerable decreased T1 and increased T2 signal within the   soft tissues of the dorsum of the midfoot extending into the mid and   distal foot as well. A 12 x 10 x 5 mm fluid collection along the   dorsal surface of the foot wedged between the second and third   metatarsal heads which might represent a small abscess.  This   examination is performed without contrast. There is loss of field   homogeneity on the sagittal views. There is no definite abnormal   signal intensity within the osseous structures to suggest   osteomyelitis.           Impression       Cellulitis of the mid and distal foot with 12 x 10 x 5 mm abscess   between the second and third metatarsal heads on the dorsal surface of   foot.       No evidence to confirm osteomyelitis.                     Resident's Assessment and Plan     Macario Medrano is a 37 y.o. female with PMHx of IDDM & Adrenal Insufficiency presenting with LUQ abdominal pain, nausea, & vomiting with hyperglycemia/DKA and Rt DM foot ulcer. 1. Abdominal Pain- 2/2 ?inflammatory bowel disease vs Gastroparesis vs Chronic Adrenal Insuffiency vs Autoimmune bowel disease (Crohn's/Celiac) vs Doxycycline. Epigastric, LUQ/LLQ, pain after large meals. Elevated ESR/CRP. CAT scan (5-29-20)- small bowel thickening w/i jejunal loops which are mildly dilated; Ileal loops dilated w/o wall thickening; findings most likely reflective of enteritis/Crohn's. Repeat CAT scan (7-5-20): Prominently distended urinary bladder dome; Reactive inguinal LN; mild hepatomegaly.    -Tylenol prn for pain   -Phenergan or zofran prn for n/v    -Reglan for gastroparesis   -Diet: small frequent meals     -Further OP work-up for inflammatory bowel     2. Right Diabetic Foot Ulcer and abscess- IDDM, h/o polyneuropathy and BL foot drop. Recently d/c 6-29-20 from ED on Doxycycline. Foot XR unremarkable (no fracture or osteomyelitis). Leukocytosis, CRP elevated at 7.7 (5.6 on 6-27-20). MRI of the foot does show an abscess between 2nd & 3rd metatarsal on the Rt foot; clinical evidence of osteomyelitis. Vascular studies showed good arterial blood flow to the Rt foot. - continue Zosyn, plan is to continue IV antibiotics for at least 3               weeks .    -Follow Podiatry and wound care recommendations. -I&D 7/7/2020 - grew strep agalactiae     3.  Cyanosed 3 rd toe

## 2020-07-09 NOTE — PROGRESS NOTES
Call placed to surgery to see if patient was on the schedule before giving her breakfast. Not currently on the list at this time.

## 2020-07-09 NOTE — PROGRESS NOTES
Call placed to Dr. Clayton Needs to verify that surgery was not happening today.  Also relayed that patient was refusing to sign the consent until she talked to someone from his team.

## 2020-07-10 ENCOUNTER — ANESTHESIA (OUTPATIENT)
Dept: OPERATING ROOM | Age: 35
DRG: 314 | End: 2020-07-10
Payer: COMMERCIAL

## 2020-07-10 ENCOUNTER — ANESTHESIA EVENT (OUTPATIENT)
Dept: OPERATING ROOM | Age: 35
DRG: 314 | End: 2020-07-10
Payer: COMMERCIAL

## 2020-07-10 VITALS — DIASTOLIC BLOOD PRESSURE: 58 MMHG | SYSTOLIC BLOOD PRESSURE: 98 MMHG | OXYGEN SATURATION: 90 %

## 2020-07-10 LAB
ALBUMIN SERPL-MCNC: 3.2 G/DL (ref 3.5–5.2)
ALP BLD-CCNC: 109 U/L (ref 35–104)
ALT SERPL-CCNC: 9 U/L (ref 0–32)
ANION GAP SERPL CALCULATED.3IONS-SCNC: 10 MMOL/L (ref 7–16)
AST SERPL-CCNC: 12 U/L (ref 0–31)
BASOPHILS ABSOLUTE: 0.09 E9/L (ref 0–0.2)
BASOPHILS RELATIVE PERCENT: 1 % (ref 0–2)
BILIRUB SERPL-MCNC: 0.3 MG/DL (ref 0–1.2)
BLOOD CULTURE, ROUTINE: NORMAL
BUN BLDV-MCNC: 12 MG/DL (ref 6–20)
CALCIUM SERPL-MCNC: 8.4 MG/DL (ref 8.6–10.2)
CHLORIDE BLD-SCNC: 103 MMOL/L (ref 98–107)
CO2: 26 MMOL/L (ref 22–29)
CREAT SERPL-MCNC: 1.1 MG/DL (ref 0.5–1)
EOSINOPHILS ABSOLUTE: 0.36 E9/L (ref 0.05–0.5)
EOSINOPHILS RELATIVE PERCENT: 4 % (ref 0–6)
GFR AFRICAN AMERICAN: >60
GFR NON-AFRICAN AMERICAN: 56 ML/MIN/1.73
GLUCOSE BLD-MCNC: 256 MG/DL (ref 74–99)
HCT VFR BLD CALC: 30.8 % (ref 34–48)
HEMOGLOBIN: 10 G/DL (ref 11.5–15.5)
IMMATURE GRANULOCYTES #: 0.04 E9/L
IMMATURE GRANULOCYTES %: 0.4 % (ref 0–5)
LYMPHOCYTES ABSOLUTE: 2.52 E9/L (ref 1.5–4)
LYMPHOCYTES RELATIVE PERCENT: 27.9 % (ref 20–42)
MAGNESIUM: 2 MG/DL (ref 1.6–2.6)
MCH RBC QN AUTO: 26.1 PG (ref 26–35)
MCHC RBC AUTO-ENTMCNC: 32.5 % (ref 32–34.5)
MCV RBC AUTO: 80.4 FL (ref 80–99.9)
METER GLUCOSE: 131 MG/DL (ref 74–99)
METER GLUCOSE: 164 MG/DL (ref 74–99)
METER GLUCOSE: 218 MG/DL (ref 74–99)
METER GLUCOSE: 247 MG/DL (ref 74–99)
MONOCYTES ABSOLUTE: 0.53 E9/L (ref 0.1–0.95)
MONOCYTES RELATIVE PERCENT: 5.9 % (ref 2–12)
NEUTROPHILS ABSOLUTE: 5.5 E9/L (ref 1.8–7.3)
NEUTROPHILS RELATIVE PERCENT: 60.8 % (ref 43–80)
PDW BLD-RTO: 13.2 FL (ref 11.5–15)
PHOSPHORUS: 3.1 MG/DL (ref 2.5–4.5)
PLATELET # BLD: 316 E9/L (ref 130–450)
PMV BLD AUTO: 11.6 FL (ref 7–12)
POTASSIUM REFLEX MAGNESIUM: 3.7 MMOL/L (ref 3.5–5)
RBC # BLD: 3.83 E12/L (ref 3.5–5.5)
SODIUM BLD-SCNC: 139 MMOL/L (ref 132–146)
TOTAL PROTEIN: 7 G/DL (ref 6.4–8.3)
WBC # BLD: 9 E9/L (ref 4.5–11.5)

## 2020-07-10 PROCEDURE — 2580000003 HC RX 258

## 2020-07-10 PROCEDURE — 6370000000 HC RX 637 (ALT 250 FOR IP): Performed by: PODIATRIST

## 2020-07-10 PROCEDURE — 1200000000 HC SEMI PRIVATE

## 2020-07-10 PROCEDURE — 6360000002 HC RX W HCPCS: Performed by: SPECIALIST

## 2020-07-10 PROCEDURE — 6360000002 HC RX W HCPCS

## 2020-07-10 PROCEDURE — 3700000001 HC ADD 15 MINUTES (ANESTHESIA): Performed by: PODIATRIST

## 2020-07-10 PROCEDURE — 88311 DECALCIFY TISSUE: CPT

## 2020-07-10 PROCEDURE — 80053 COMPREHEN METABOLIC PANEL: CPT

## 2020-07-10 PROCEDURE — 6360000002 HC RX W HCPCS: Performed by: PODIATRIST

## 2020-07-10 PROCEDURE — 3700000000 HC ANESTHESIA ATTENDED CARE: Performed by: PODIATRIST

## 2020-07-10 PROCEDURE — 3600000012 HC SURGERY LEVEL 2 ADDTL 15MIN: Performed by: PODIATRIST

## 2020-07-10 PROCEDURE — 99232 SBSQ HOSP IP/OBS MODERATE 35: CPT | Performed by: INTERNAL MEDICINE

## 2020-07-10 PROCEDURE — 82962 GLUCOSE BLOOD TEST: CPT

## 2020-07-10 PROCEDURE — 7100000000 HC PACU RECOVERY - FIRST 15 MIN: Performed by: PODIATRIST

## 2020-07-10 PROCEDURE — 2500000003 HC RX 250 WO HCPCS: Performed by: PODIATRIST

## 2020-07-10 PROCEDURE — 2580000003 HC RX 258: Performed by: INTERNAL MEDICINE

## 2020-07-10 PROCEDURE — 6360000002 HC RX W HCPCS: Performed by: INTERNAL MEDICINE

## 2020-07-10 PROCEDURE — 83735 ASSAY OF MAGNESIUM: CPT

## 2020-07-10 PROCEDURE — 85025 COMPLETE CBC W/AUTO DIFF WBC: CPT

## 2020-07-10 PROCEDURE — 36415 COLL VENOUS BLD VENIPUNCTURE: CPT

## 2020-07-10 PROCEDURE — 7100000001 HC PACU RECOVERY - ADDTL 15 MIN: Performed by: PODIATRIST

## 2020-07-10 PROCEDURE — 2580000003 HC RX 258: Performed by: SPECIALIST

## 2020-07-10 PROCEDURE — 6360000002 HC RX W HCPCS: Performed by: ANESTHESIOLOGY

## 2020-07-10 PROCEDURE — 0J9Q0ZZ DRAINAGE OF RIGHT FOOT SUBCUTANEOUS TISSUE AND FASCIA, OPEN APPROACH: ICD-10-PCS | Performed by: PODIATRIST

## 2020-07-10 PROCEDURE — 88305 TISSUE EXAM BY PATHOLOGIST: CPT

## 2020-07-10 PROCEDURE — 3600000002 HC SURGERY LEVEL 2 BASE: Performed by: PODIATRIST

## 2020-07-10 PROCEDURE — 2580000003 HC RX 258: Performed by: STUDENT IN AN ORGANIZED HEALTH CARE EDUCATION/TRAINING PROGRAM

## 2020-07-10 PROCEDURE — 84100 ASSAY OF PHOSPHORUS: CPT

## 2020-07-10 PROCEDURE — 0Y6T0Z0 DETACHMENT AT RIGHT 3RD TOE, COMPLETE, OPEN APPROACH: ICD-10-PCS | Performed by: PODIATRIST

## 2020-07-10 RX ORDER — DIPHENHYDRAMINE HYDROCHLORIDE 50 MG/ML
12.5 INJECTION INTRAMUSCULAR; INTRAVENOUS
Status: DISCONTINUED | OUTPATIENT
Start: 2020-07-10 | End: 2020-07-10 | Stop reason: HOSPADM

## 2020-07-10 RX ORDER — ACETAMINOPHEN 650 MG
TABLET, EXTENDED RELEASE ORAL PRN
Status: DISCONTINUED | OUTPATIENT
Start: 2020-07-10 | End: 2020-07-10 | Stop reason: ALTCHOICE

## 2020-07-10 RX ORDER — BUPIVACAINE HYDROCHLORIDE 5 MG/ML
INJECTION, SOLUTION EPIDURAL; INTRACAUDAL PRN
Status: DISCONTINUED | OUTPATIENT
Start: 2020-07-10 | End: 2020-07-10 | Stop reason: ALTCHOICE

## 2020-07-10 RX ORDER — FENTANYL CITRATE 50 UG/ML
50 INJECTION, SOLUTION INTRAMUSCULAR; INTRAVENOUS EVERY 5 MIN PRN
Status: DISCONTINUED | OUTPATIENT
Start: 2020-07-10 | End: 2020-07-10 | Stop reason: HOSPADM

## 2020-07-10 RX ORDER — HYDROCODONE BITARTRATE AND ACETAMINOPHEN 10; 325 MG/1; MG/1
1 TABLET ORAL EVERY 4 HOURS PRN
Status: DISCONTINUED | OUTPATIENT
Start: 2020-07-10 | End: 2020-07-13 | Stop reason: HOSPADM

## 2020-07-10 RX ORDER — MEPERIDINE HYDROCHLORIDE 25 MG/ML
12.5 INJECTION INTRAMUSCULAR; INTRAVENOUS; SUBCUTANEOUS EVERY 5 MIN PRN
Status: DISCONTINUED | OUTPATIENT
Start: 2020-07-10 | End: 2020-07-10 | Stop reason: HOSPADM

## 2020-07-10 RX ORDER — 0.9 % SODIUM CHLORIDE 0.9 %
1000 INTRAVENOUS SOLUTION INTRAVENOUS ONCE
Status: COMPLETED | OUTPATIENT
Start: 2020-07-10 | End: 2020-07-10

## 2020-07-10 RX ORDER — OXYCODONE HYDROCHLORIDE AND ACETAMINOPHEN 5; 325 MG/1; MG/1
1 TABLET ORAL
Status: DISCONTINUED | OUTPATIENT
Start: 2020-07-10 | End: 2020-07-10 | Stop reason: HOSPADM

## 2020-07-10 RX ORDER — FENTANYL CITRATE 50 UG/ML
25 INJECTION, SOLUTION INTRAMUSCULAR; INTRAVENOUS EVERY 5 MIN PRN
Status: DISCONTINUED | OUTPATIENT
Start: 2020-07-10 | End: 2020-07-10 | Stop reason: HOSPADM

## 2020-07-10 RX ORDER — SODIUM CHLORIDE 9 MG/ML
INJECTION, SOLUTION INTRAVENOUS CONTINUOUS PRN
Status: DISCONTINUED | OUTPATIENT
Start: 2020-07-10 | End: 2020-07-10 | Stop reason: SDUPTHER

## 2020-07-10 RX ORDER — PROPOFOL 10 MG/ML
INJECTION, EMULSION INTRAVENOUS CONTINUOUS PRN
Status: DISCONTINUED | OUTPATIENT
Start: 2020-07-10 | End: 2020-07-10 | Stop reason: SDUPTHER

## 2020-07-10 RX ORDER — PIPERACILLIN SODIUM, TAZOBACTAM SODIUM 3; .375 G/15ML; G/15ML
INJECTION, POWDER, LYOPHILIZED, FOR SOLUTION INTRAVENOUS PRN
Status: DISCONTINUED | OUTPATIENT
Start: 2020-07-10 | End: 2020-07-10 | Stop reason: SDUPTHER

## 2020-07-10 RX ORDER — SODIUM CHLORIDE 9 MG/ML
INJECTION, SOLUTION INTRAVENOUS CONTINUOUS
Status: ACTIVE | OUTPATIENT
Start: 2020-07-10 | End: 2020-07-10

## 2020-07-10 RX ORDER — FENTANYL CITRATE 50 UG/ML
INJECTION, SOLUTION INTRAMUSCULAR; INTRAVENOUS PRN
Status: DISCONTINUED | OUTPATIENT
Start: 2020-07-10 | End: 2020-07-10 | Stop reason: SDUPTHER

## 2020-07-10 RX ORDER — PROMETHAZINE HYDROCHLORIDE 25 MG/ML
6.25 INJECTION, SOLUTION INTRAMUSCULAR; INTRAVENOUS
Status: DISCONTINUED | OUTPATIENT
Start: 2020-07-10 | End: 2020-07-10 | Stop reason: HOSPADM

## 2020-07-10 RX ORDER — MIDAZOLAM HYDROCHLORIDE 1 MG/ML
INJECTION INTRAMUSCULAR; INTRAVENOUS PRN
Status: DISCONTINUED | OUTPATIENT
Start: 2020-07-10 | End: 2020-07-10 | Stop reason: SDUPTHER

## 2020-07-10 RX ADMIN — PROPOFOL 35 MCG/KG/MIN: 10 INJECTION, EMULSION INTRAVENOUS at 09:38

## 2020-07-10 RX ADMIN — POLYETHYLENE GLYCOL 3350 17 G: 17 POWDER, FOR SOLUTION ORAL at 21:32

## 2020-07-10 RX ADMIN — FENTANYL CITRATE 25 MCG: 50 INJECTION, SOLUTION INTRAMUSCULAR; INTRAVENOUS at 09:45

## 2020-07-10 RX ADMIN — FENTANYL CITRATE 50 MCG: 50 INJECTION, SOLUTION INTRAMUSCULAR; INTRAVENOUS at 09:50

## 2020-07-10 RX ADMIN — FENTANYL CITRATE 25 MCG: 50 INJECTION, SOLUTION INTRAMUSCULAR; INTRAVENOUS at 10:14

## 2020-07-10 RX ADMIN — ONDANSETRON HYDROCHLORIDE 4 MG: 2 SOLUTION INTRAMUSCULAR; INTRAVENOUS at 05:15

## 2020-07-10 RX ADMIN — FAMOTIDINE 20 MG: 20 TABLET ORAL at 20:26

## 2020-07-10 RX ADMIN — INSULIN GLARGINE 35 UNITS: 100 INJECTION, SOLUTION SUBCUTANEOUS at 20:32

## 2020-07-10 RX ADMIN — HYDROCODONE BITARTRATE AND ACETAMINOPHEN 1 TABLET: 10; 325 TABLET ORAL at 17:13

## 2020-07-10 RX ADMIN — METOCLOPRAMIDE 5 MG: 5 TABLET ORAL at 13:05

## 2020-07-10 RX ADMIN — PIPERACILLIN AND TAZOBACTAM 3.38 G: 3; .375 INJECTION, POWDER, FOR SOLUTION INTRAVENOUS at 09:42

## 2020-07-10 RX ADMIN — GABAPENTIN 600 MG: 300 CAPSULE ORAL at 17:13

## 2020-07-10 RX ADMIN — METOCLOPRAMIDE 5 MG: 5 TABLET ORAL at 17:19

## 2020-07-10 RX ADMIN — PIPERACILLIN AND TAZOBACTAM: 3; .375 INJECTION, POWDER, FOR SOLUTION INTRAVENOUS at 01:08

## 2020-07-10 RX ADMIN — INSULIN LISPRO 5 UNITS: 100 INJECTION, SOLUTION INTRAVENOUS; SUBCUTANEOUS at 13:15

## 2020-07-10 RX ADMIN — CILOSTAZOL 100 MG: 100 TABLET ORAL at 20:26

## 2020-07-10 RX ADMIN — HYDROCODONE BITARTRATE AND ACETAMINOPHEN 1 TABLET: 10; 325 TABLET ORAL at 22:54

## 2020-07-10 RX ADMIN — MIDAZOLAM 2 MG: 1 INJECTION INTRAMUSCULAR; INTRAVENOUS at 09:35

## 2020-07-10 RX ADMIN — SODIUM CHLORIDE: 9 INJECTION, SOLUTION INTRAVENOUS at 05:53

## 2020-07-10 RX ADMIN — INSULIN LISPRO 4 UNITS: 100 INJECTION, SOLUTION INTRAVENOUS; SUBCUTANEOUS at 13:17

## 2020-07-10 RX ADMIN — METOCLOPRAMIDE 5 MG: 5 TABLET ORAL at 20:26

## 2020-07-10 RX ADMIN — ERTAPENEM SODIUM 1000 MG: 1 INJECTION, POWDER, LYOPHILIZED, FOR SOLUTION INTRAMUSCULAR; INTRAVENOUS at 17:54

## 2020-07-10 RX ADMIN — INSULIN LISPRO 1 UNITS: 100 INJECTION, SOLUTION INTRAVENOUS; SUBCUTANEOUS at 20:32

## 2020-07-10 RX ADMIN — SODIUM CHLORIDE: 9 INJECTION, SOLUTION INTRAVENOUS at 13:08

## 2020-07-10 RX ADMIN — INSULIN LISPRO 5 UNITS: 100 INJECTION, SOLUTION INTRAVENOUS; SUBCUTANEOUS at 17:14

## 2020-07-10 RX ADMIN — SODIUM CHLORIDE 1000 ML: 9 INJECTION, SOLUTION INTRAVENOUS at 13:05

## 2020-07-10 RX ADMIN — FENTANYL CITRATE 25 MCG: 50 INJECTION INTRAMUSCULAR; INTRAVENOUS at 10:56

## 2020-07-10 RX ADMIN — SODIUM CHLORIDE: 9 INJECTION, SOLUTION INTRAVENOUS at 09:35

## 2020-07-10 ASSESSMENT — PAIN DESCRIPTION - ONSET
ONSET: ON-GOING
ONSET: ON-GOING
ONSET: SUDDEN
ONSET: ON-GOING
ONSET: ON-GOING

## 2020-07-10 ASSESSMENT — PAIN DESCRIPTION - PROGRESSION
CLINICAL_PROGRESSION: NOT CHANGED
CLINICAL_PROGRESSION: NOT CHANGED

## 2020-07-10 ASSESSMENT — PAIN SCALES - GENERAL
PAINLEVEL_OUTOF10: 0
PAINLEVEL_OUTOF10: 0
PAINLEVEL_OUTOF10: 5
PAINLEVEL_OUTOF10: 0
PAINLEVEL_OUTOF10: 0
PAINLEVEL_OUTOF10: 8
PAINLEVEL_OUTOF10: 0
PAINLEVEL_OUTOF10: 7
PAINLEVEL_OUTOF10: 2
PAINLEVEL_OUTOF10: 8
PAINLEVEL_OUTOF10: 2

## 2020-07-10 ASSESSMENT — PAIN DESCRIPTION - LOCATION
LOCATION: FOOT

## 2020-07-10 ASSESSMENT — PULMONARY FUNCTION TESTS
PIF_VALUE: 1
PIF_VALUE: 0
PIF_VALUE: 0
PIF_VALUE: 1
PIF_VALUE: 0
PIF_VALUE: 1
PIF_VALUE: 0
PIF_VALUE: 1
PIF_VALUE: 0
PIF_VALUE: 1

## 2020-07-10 ASSESSMENT — PAIN DESCRIPTION - DESCRIPTORS
DESCRIPTORS: ACHING;DISCOMFORT
DESCRIPTORS: BURNING
DESCRIPTORS: BURNING;DULL
DESCRIPTORS: BURNING;DULL
DESCRIPTORS: TIGHTNESS

## 2020-07-10 ASSESSMENT — PAIN DESCRIPTION - ORIENTATION
ORIENTATION: RIGHT

## 2020-07-10 ASSESSMENT — PAIN DESCRIPTION - PAIN TYPE
TYPE: SURGICAL PAIN

## 2020-07-10 ASSESSMENT — PAIN DESCRIPTION - FREQUENCY
FREQUENCY: CONTINUOUS

## 2020-07-10 ASSESSMENT — PAIN - FUNCTIONAL ASSESSMENT: PAIN_FUNCTIONAL_ASSESSMENT: PREVENTS OR INTERFERES SOME ACTIVE ACTIVITIES AND ADLS

## 2020-07-10 NOTE — HOME CARE
Referral received for home health care for home infusion. Patient has 100% coverage. Call out to patient in room as well as personal cell. No answer. Left detailed vml to return call.    Yanely Conner LPN/ROSALINDA

## 2020-07-10 NOTE — PROGRESS NOTES
Patient admitted to PACU and placed on appropriate monitors. Airway patent at this time. Report obtained from CRNA. Warm blankets applied.

## 2020-07-10 NOTE — PROGRESS NOTES
[facility]  [facility]  Progress Note    SUBJECTIVE:  Chief Complaint   Patient presents with    Abdominal Pain     RUQ pain, onset a couple of hours ago, with nausea. states had similar issue sometime in the alst couple months and was seen in the ER for it but states she was never told what it was    Dizziness     onset at the same time as her abd pain     Patient is seen and examined at bedside. She is awake and alert. Patient is tolerating medications. No reported adverse drug reactions. No nausea, vomiting, diarrhea. Afebrile      Review of systems:  As stated above in the chief complaint, otherwise negative.     Medications:  Scheduled Meds:   insulin lispro  0-12 Units Subcutaneous TID WC    insulin lispro  0-6 Units Subcutaneous Nightly    sodium chloride  1,000 mL Intravenous Once    insulin lispro  5 Units Subcutaneous TID AC    insulin glargine  35 Units Subcutaneous Nightly    IVPB builder   Intravenous Q8H    And    sodium chloride   Intravenous Q8H    metoclopramide  5 mg Oral 4x Daily AC & HS    aspirin  81 mg Oral Daily    cilostazol  100 mg Oral BID    gabapentin  600 mg Oral BID WC    pantoprazole  40 mg Oral QAM AC    lidocaine  5 mL Intradermal Once    heparin flush  1 mL Intravenous 2 times per day    famotidine  20 mg Oral BID    enoxaparin  40 mg Subcutaneous Daily    sodium chloride flush  10 mL Intravenous 2 times per day    sodium chloride  500 mL Intravenous Once     Continuous Infusions:   sodium chloride 100 mL/hr at 07/10/20 1308    dextrose       PRN Meds:ibuprofen **AND** acetaminophen, sodium chloride flush, heparin flush, sodium chloride flush, acetaminophen **OR** acetaminophen, polyethylene glycol, promethazine **OR** ondansetron, glucose, dextrose, glucagon (rDNA), dextrose    OBJECTIVE:  /66   Pulse 84   Temp 97.1 °F (36.2 °C) (Temporal)   Resp 16   Ht 5' 2\" (1.575 m)   Wt 268 lb 6.4 oz (121.7 kg)   SpO2 96% BMI 49.09 kg/m²   Temp  Av.2 °F (36.2 °C)  Min: 96 °F (35.6 °C)  Max: 97.8 °F (36.6 °C)  Constitutional: The patient is awake, alert, and oriented. Skin: Warm and dry. No rashes were noted. HEENT: Round and reactive pupils. Moist mucous membranes. No ulcerations or thrush. Neck: Supple to movements. Chest: No use of accessory muscles to breathe. Symmetrical expansion. No wheezing, crackles or rhonchi. Cardiovascular: S1 and S2 are rhythmic and regular. No murmurs appreciated. Abdomen: Positive bowel sounds to auscultation. Benign to palpation. No masses felt. No hepatosplenomegaly.   Extremities: Right foot dressed with dressing--- full range of motion all 5 toes; right foot third toe cyanotic  Neurological: Footdrop on the right and neuropathy  Lines:Midline    Laboratory and Tests Review:  Lab Results   Component Value Date    WBC 9.0 07/10/2020    WBC 9.0 2020    WBC 14.2 (H) 2020    HGB 10.0 (L) 07/10/2020    HCT 30.8 (L) 07/10/2020    MCV 80.4 07/10/2020     07/10/2020     Lab Results   Component Value Date    NEUTROABS 5.50 07/10/2020    NEUTROABS 5.82 2020    NEUTROABS 11.84 (H) 2020     No results found for: [facility]  Lab Results   Component Value Date    ALT 9 07/10/2020    AST 12 07/10/2020    ALKPHOS 109 (H) 07/10/2020    BILITOT 0.3 07/10/2020     Lab Results   Component Value Date     07/10/2020    K 3.7 07/10/2020     07/10/2020    CO2 26 07/10/2020    BUN 12 07/10/2020    CREATININE 1.1 07/10/2020    CREATININE 1.1 2020    CREATININE 1.0 2020    GFRAA >60 07/10/2020    LABGLOM 56 07/10/2020    GLUCOSE 256 07/10/2020    PROT 7.0 07/10/2020    LABALBU 3.2 07/10/2020    CALCIUM 8.4 07/10/2020    BILITOT 0.3 07/10/2020    ALKPHOS 109 07/10/2020    AST 12 07/10/2020    ALT 9 07/10/2020     Lab Results   Component Value Date    CRP 7.7 (H) 2020    CRP 5.6 (H) 2020    CRP 1.8 (H) 2020     Lab Results   Component Value Date SEDRATE 96 (H) 07/05/2020    SEDRATE 97 (H) 06/27/2020    SEDRATE 46 (H) 03/06/2020       Radiology:  Noted    Microbiology:   Blood culture 7/5/2020 20-24 hours no growth  Urine culture 7/5/2020-mixed omi with gram-positive's and gram-negative rods.   Surgical discharge group B strep  Assessment:  · Soft tissue abscess/ deep space infection right foot second third toe status post right foot incision and drainage of multiple compartments and right second toe bone biopsy  · Leukocytosis--resolved  · Cyanotic-- right foot third toe  (seen by vascular)     Plan:    · Cont (will need at least 3 weeks of IV antibiotics)  · Check cultures from 7/7/2020 group B strep  · Follow surgical pathology from 7/7/2020  · Baseline CRP-7.7, sedimentation rate- 96  · Midline placed 7/7/2020  · Monitor labs--CBC with differential, CMP ordered for tomorrow morning  · Podiatry following--- possible plans for toe amputation tommorrow  · Vascular following  · Arrange for home IV antibiotics  · Will follow with you       Isabelle Vásuqez MD   7/10/2020

## 2020-07-10 NOTE — CARE COORDINATION
7/10 Update CM Note: [facility] to follow for iv invanz qd for 26 days and home care services. Per Alex Vargas at [facility] they will open patient on Tuesday if she discharges on Monday. They do not have availability before then. Dr Marian Hood notified and patient to remain until Monday for possible discharge home. Will need home care orders along with dressing change orders per podiatry. IM resident Dr Lisa Foley notified of above. Will follow.  JT

## 2020-07-10 NOTE — PLAN OF CARE
Problem: Falls - Risk of:  Goal: Will remain free from falls  Description: Will remain free from falls  Outcome: Met This Shift  Goal: Absence of physical injury  Description: Absence of physical injury  Outcome: Met This Shift     Problem: Pain:  Goal: Control of acute pain  Description: Control of acute pain  Outcome: Ongoing

## 2020-07-10 NOTE — OP NOTE
[address]                  [address] [facility],  [facility]                                OPERATIVE REPORT    PATIENT NAME: Arleen Chamorro           :        1985  MED REC NO:   [id number]                            ROOM:       8208  ACCOUNT NO:   [de-identified]                           ADMIT DATE: 2020  PROVIDER:     Alma Rosa Hogan DPM    DATE OF PROCEDURE:  07/10/2020    PREOPERATIVE DIAGNOSES:  1. Right foot open wound, status post incision and drainage on  2020.  2.  Right third toe gangrene. POSTOPERATIVE DIAGNOSES:  1. Right foot open wound, status post incision and drainage on  2020.  2.  Right third toe gangrene. PROCEDURES:  1. Amputation of right third toe to level of the metatarsophalangeal  joint. 2.  Incision and drainage of right foot with delayed primary closure of  right foot. ANESTHESIA:  Monitored anesthesia care. INJECTABLE:  20 mL of 0.5% Marcaine plain. SURGEON:  Alma Rosa Hogan DPM    ASSISTANT:  Adelaide Rivero, PGY-2.    ESTIMATED BLOOD LOSS:  Minimal.    COMPLICATIONS:  None. SPECIMEN:  Right third toe. HEMOSTASIS:  On the field. INTRAOPERATIVE FINDINGS:  Necrosis of third toe and soft tissues  surrounding the third toe. Adequate bleeding is observed. Excellent  closure noted postoperatively. INDICATIONS:  This is a 38-year-old female, who presents today for  repeat debridement of the right foot. The patient underwent initial  incision and drainage and bone biopsy on 2020. Unfortunately,  since the operation, she has developed necrotic changes to her third toe  consistent with infectious process _____. The patient had preoperative  workup with vascular studies and Vascular Surgery consultation. At this  point, there is no evidence of any major arterial insufficiency.   I did  confirm with the vascular surgeon for this and we agreed to go forward  with right third toe amputation. I did  the patient on the  nature of the problem, proposed course of the procedure, potential  benefits, risks, complications, and convalescence in detail. All  questions answered to her apparent satisfaction. No guarantees were  given as to the outcome of procedure. OPERATIVE PROCEDURE:  Under mild sedation, the patient was brought to  the operating room, was placed on the operating table in the supine  position. The right lower extremity was scrubbed, prepped, and draped  in the usual sterile fashion. At this time, using a #10-blade, I  performed an elliptical incision encompassing the previous incision site  and taken to the level of subcutaneous tissue. Bleeders were ligated  appropriate. Next, racquet-type incision was performed to encompass the  necrosed third toe, which was then safely disarticulated at the  metatarsophalangeal joint without any complications. The toe was passed  off for pathological examination. I irrigated the area using a pulsed  lavage system, 1 L of normal sterile saline was used. Next, I was able  to reapproximate the tissue flap from medial to lateral without any  tension. A 3-0 nylon was used to close the incision. Excellent closure  was maintained. Postoperative bandage was then applied. The patient tolerated the procedure and anesthesia well in apparent  satisfactory condition with vital signs stable, vascular status intact  to the right lower extremity. The patient was transferred to the PACU  for further monitoring prior to readmission to the nursing floor.         Edilma Newlel DPM    D: 07/10/2020 10:33:55       T: 07/10/2020 12:12:23     [email or web address]  Job#: [id number]     Doc#: [id number]    CC:

## 2020-07-10 NOTE — PROGRESS NOTES
[facility]  Internal Medicine Residency Program  Progress Note - House Team 2    Patient:  Drake Whitehead 37 y.o. female MRN: [id number]     Date of Service: 7/10/2020     CC: had concerns including Abdominal Pain (RUQ pain, onset a couple of hours ago, with nausea. states had similar issue sometime in the alst couple months and was seen in the ER for it but states she was never told what it was) and Dizziness (onset at the same time as her abd pain). Overnight events: elevated BG at 247 she was NPO for surgery today    Subjective     Patient was in ultrasound unable to assess this morning. Objective     Unable to assess patient was in ultrasound     Subject  Pertinent Labs & Imaging Studies   winnie  CBC:   Lab Results   Component Value Date    WBC 9.0 07/10/2020    RBC 3.83 07/10/2020    HGB 10.0 07/10/2020    HCT 30.8 07/10/2020    MCV 80.4 07/10/2020    MCH 26.1 07/10/2020    MCHC 32.5 07/10/2020    RDW 13.2 07/10/2020     07/10/2020    MPV 11.6 07/10/2020     BMP:    Lab Results   Component Value Date     07/10/2020    K 3.7 07/10/2020     07/10/2020    CO2 26 07/10/2020    BUN 12 07/10/2020    LABALBU 3.2 07/10/2020    CREATININE 1.1 07/10/2020    CALCIUM 8.4 07/10/2020    GFRAA >60 07/10/2020    LABGLOM 56 07/10/2020    GLUCOSE 256 07/10/2020       Resident's Assessment and Plan     Drake Whitehead is a 37 y.o. female with PMH of diabetes mellitus type II, chronic adrenal insufficiency who presented with LUQ abdominal pain, nausea, and vomiting with hyperglycemia/DKA and a right diabetic foot ulcer    1.  Abdominal pain   -possibly due to inflammatory bowel disease vs. Gastroparesis vs. Adrenal insufficiency vs. Autoimmune bowel disease    -mainly gets pain in LLQ/LUQ after meals   -ESR CRP was elevated    -CT scan on 5/29/20 showed small bowel thickening w/ jejunal loops that were mildly and ileal loops that were dilated reflective of crohn's/enteritis. Repeat CT on 7/5/20 showed a distended urinary bladder dome, reactive inguinal lymph node and mild hepatomegaly    - Tylenol prn for pain              -Phenergan or zofran prn for n/v               -Reglan for gastroparesis              -Diet: NPO for surgery              -Further OP work-up for inflammatory bowel     2. Right Diabetic Foot Ulcer and abscess   -ID consult placed, follow recommendations    -received toradol 30mg for pain   -Continue Zosyn will need for at least 3 weeks, received 1 dose of vancomycin    3. Ischemic 3rd toe 2/2 Diabetes   -planned toe debridement and amputation of 3rd toe   -Vascular surgery consulted and do not recommend surgical intervention as she has good pulses   -Possible hyperbaric oxygen therapy      4. Diabetes Mellitus II   -uncontrolled blood sugar   -patient was on hydrocotrisone before and was getting dextrose containing fluids with antibiotics which have been switched to .9% normal saline   -continue high dose sliding scale insulin, lantus 3/day before meals    -follow POCT glucos Q4   - blood sugar high this morning at 247 after patient was NPO last night for surgery continue to monitor   -Continue IV fluids    5. Chronic adrenal insufficiency   - on hydrocortisone 15mg at home, stopped for now   - get orthostatics positive may need give a dose of hydrocortisone after surgery    6. Leukocytosis- resolved   -likely due to toe infection/cellulitis   -continue Zosyn   -Blood cultures show no growth to date    7. Hx of GERD   On pepcid 20mg BID       PT/OT evaluation: none   DVT prophylaxis/ GI prophylaxis: Lovenox  Disposition: continue current care    Kimberley Prince DO, PGY-1  Attending physician: Dr. Ruth Millan     Attending Physician Statement:  Kathryn Haji M.D., F.A.C.P.     I have discussed the case, including pertinent history and exam findings with the resident/NP.  I have seen and care with other providers and/or counseling patient/family  Remainder of medical problems as per resident note.

## 2020-07-10 NOTE — ANESTHESIA PRE PROCEDURE
Department of Anesthesiology  Preprocedure Note       Name:  Jordy Alfaro   Age:  37 y.o.  :  1985                                          MRN:  [id number]         Date:  7/10/2020      Surgeon: Ban Singleton):  Tirso Mcdonald DPM    Procedure: Procedure(s):  RIGHT FOOT INCISION AND DRAINAGE WITH AMPUTATION 3RD TOE    Medications prior to admission:   Prior to Admission medications    Medication Sig Start Date End Date Taking?  Authorizing Provider   cetirizine (ZYRTEC ALLERGY) 10 MG tablet Take 1 tablet by mouth daily 20  Yes Joesph Junior,    ondansetron (ZOFRAN ODT) 8 MG TBDP disintegrating tablet Place 1 tablet under the tongue every 8 hours as needed for Nausea 20  Yes Gio Munoz,    Liraglutide (VICTOZA) 18 MG/3ML SOPN SC injection Inject 0.6 mg into the skin daily   Yes Historical Provider, MD   insulin glargine (BASAGLAR KWIKPEN) 100 UNIT/ML injection pen Inject 25 Units into the skin nightly 3/10/20  Yes Astrid Peter MD   insulin lispro (HUMALOG) 100 UNIT/ML injection vial Take 5 units of Humalog with eat meal  plus If blood sugars are 150-200 -add 1 units of Humalog If blood sugars are 201-250 - add 2 units of Humalog If blood sugars are 251-300 - add 3 units of Humalog If blood sugars are 301-350 - add 4 units of Humalog If  above 350 - add 5 unit of humalog 3/10/20  Yes Sheryle Paschal, MD   raNITIdine HCl (RANITIDINE 150 MAX STRENGTH PO) Take 300 mg by mouth daily   Yes Historical Provider, MD   GABAPENTIN PO Take 600 mg by mouth 2 times daily (with meals)    Yes Historical Provider, MD   omeprazole (PRILOSEC) 40 MG delayed release capsule Take 1 capsule by mouth daily 17  Krish Carter MD       Current medications:    Current Facility-Administered Medications   Medication Dose Route Frequency Provider Last Rate Last Dose    insulin lispro (HUMALOG) injection vial 0-12 Units  0-12 Units Subcutaneous TID  Howie Quintana MD        insulin lispro (HUMALOG) injection vial 0-6 Units  0-6 Units Subcutaneous Nightly Bernadette Gonsales MD        insulin lispro (HUMALOG) injection vial 5 Units  5 Units Subcutaneous TID [facility] Keiko Ellsworth MD   5 Units at 07/09/20 1714    insulin glargine (LANTUS) injection vial 35 Units  35 Units Subcutaneous Nightly Keiko Ellsworth MD   35 Units at 07/09/20 2121    piperacillin-tazobactam (ZOSYN) 3.375 g in sodium chloride 0.9 % 100 mL IVPB   Intravenous Barbara Person MD   Stopped at 07/10/20 1814    And    0.9 % sodium chloride infusion admixture   Intravenous Q8H Keiko Ellsworth MD 12.5 mL/hr at 07/10/20 0553      ibuprofen (ADVIL;MOTRIN) tablet 400 mg  400 mg Oral TID PRN Emily Keith MD   400 mg at 07/09/20 1713    And    acetaminophen (TYLENOL) tablet 500 mg  500 mg Oral Q8H PRN Emily Keith MD   500 mg at 07/09/20 2115    metoclopramide (REGLAN) tablet 5 mg  5 mg Oral 4x Daily AC & HS Vadim Harirs MD   5 mg at 07/09/20 2115    aspirin EC tablet 81 mg  81 mg Oral Daily Mukesh Atkinson MD   81 mg at 07/09/20 1148    cilostazol (PLETAL) tablet 100 mg  100 mg Oral BID Mukesh Atkinson MD   100 mg at 07/09/20 2115    gabapentin (NEURONTIN) capsule 600 mg  600 mg Oral BID WC Bernadette Gonsales MD   600 mg at 07/09/20 1713    pantoprazole (PROTONIX) tablet 40 mg  40 mg Oral QAM AC Bernadette Gonsales MD   40 mg at 07/09/20 0657    lidocaine 1 % injection 5 mL  5 mL Intradermal Once Tirso X Fahim, DPM   Stopped at 07/06/20 1724    sodium chloride flush 0.9 % injection 10 mL  10 mL Intravenous PRN Tirso X Fahim, DPM        heparin flush 100 UNIT/ML injection 100 Units  1 mL Intravenous 2 times per day Tirso X Fahim, DPM   100 Units at 07/08/20 0826    heparin flush 100 UNIT/ML injection 100 Units  1 mL Intracatheter PRN Tirso X Fahim, DPM        famotidine (PEPCID) tablet 20 mg  20 mg Oral BID Tirso RICKETTS Jonathanm, DPM   20 mg at 07/09/20 2115    enoxaparin (LOVENOX) injection 40 mg  40 mg Subcutaneous Daily Tirso X Fahim, DPM   Stopped at 20 0748    sodium chloride flush 0.9 % injection 10 mL  10 mL Intravenous 2 times per day Tirso X Fahim, DPM   10 mL at 20 0848    sodium chloride flush 0.9 % injection 10 mL  10 mL Intravenous PRN Tirso X Fahim, DPM        acetaminophen (TYLENOL) tablet 650 mg  650 mg Oral Q6H PRN Tirso X Fahim, DPM   650 mg at 20 0114    Or    acetaminophen (TYLENOL) suppository 650 mg  650 mg Rectal Q6H PRN Tirso X Fahim, DPM        polyethylene glycol (GLYCOLAX) packet 17 g  17 g Oral Daily PRN Tirso X Fahim, DPM        promethazine (PHENERGAN) tablet 12.5 mg  12.5 mg Oral Q6H PRN Tirso X Fahim, DPM   12.5 mg at 20 8211    Or    ondansetron (ZOFRAN) injection 4 mg  4 mg Intravenous Q6H PRN Tirso X Fahim, DPM   4 mg at 07/10/20 0515    glucose (GLUTOSE) 40 % oral gel 15 g  15 g Oral PRN Tirso X Fahim, DPM        dextrose 50 % IV solution  12.5 g Intravenous PRN Tirso X Fahim, DPM        glucagon (rDNA) injection 1 mg  1 mg Intramuscular PRN Tirso X Fahim, DPM        dextrose 5 % solution  100 mL/hr Intravenous PRN Tirso X Fahim, DPM        0.9 % sodium chloride bolus  500 mL Intravenous Once Tirso X Fahim, DPM           Allergies:     Allergies   Allergen Reactions    Rocephin [Ceftriaxone] Rash       Problem List:    Patient Active Problem List   Diagnosis Code    Orthostatic hypotension I95.1    Urinary tract infection without hematuria N39.0    Adrenal insufficiency (HCC) E27.40    Insulin dependent type 2 diabetes mellitus, uncontrolled ([facility] [address] E11.65, Z79.4    Diabetic infection of right foot ([facility] [address] E11.628, L08.9    Hyperglycemia R73.9    Gastroparesis K31.84    Ischemic toe I99.8    Cellulitis of foot, right L03.115       Past Medical History:        Diagnosis Date    Cellulitis of foot, right 2020    Diabetes mellitus ([address]     Ischemic toe 2020       Past Surgical History:        Procedure Laterality Date     SECTION      CHOLECYSTECTOMY      FOOT DEBRIDEMENT Right 7/7/2020    RIGHT 2ND TOE  INCISION AND DRAINAGE performed by Ruddy Roper DPM at [address] [employer] History:    Social History     Tobacco Use    Smoking status: Passive Smoke Exposure - Never Smoker    Smokeless tobacco: Never Used   Substance Use Topics    Alcohol use: No                                Counseling given: Not Answered      Vital Signs (Current):   Vitals:    07/10/20 0506 07/10/20 0730 07/10/20 0745 07/10/20 0845   BP:  (!) 85/52 (!) 91/55 103/64   Pulse:  85 90 88   Resp:  16  16   Temp:  96 °F (35.6 °C)     TempSrc:  Temporal     SpO2:  95%  96%   Weight: 268 lb 6.4 oz (121.7 kg)      Height:                                                  BP Readings from Last 3 Encounters:   07/10/20 103/64   07/07/20 (!) 102/59   06/29/20 118/82       NPO Status: Time of last liquid consumption: 0000                        Time of last solid consumption: 1900                        Date of last liquid consumption: 07/10/20                        Date of last solid food consumption: 07/09/20    BMI:   Wt Readings from Last 3 Encounters:   07/10/20 268 lb 6.4 oz (121.7 kg)   06/29/20 225 lb (102.1 kg)   05/29/20 240 lb (108.9 kg)     Body mass index is 49.09 kg/m².     CBC:   Lab Results   Component Value Date    WBC 9.0 07/10/2020    RBC 3.83 07/10/2020    HGB 10.0 07/10/2020    HCT 30.8 07/10/2020    MCV 80.4 07/10/2020    RDW 13.2 07/10/2020     07/10/2020       CMP:   Lab Results   Component Value Date     07/10/2020    K 3.7 07/10/2020     07/10/2020    CO2 26 07/10/2020    BUN 12 07/10/2020    CREATININE 1.1 07/10/2020    GFRAA >60 07/10/2020    LABGLOM 56 07/10/2020    GLUCOSE 256 07/10/2020    PROT 7.0 07/10/2020    CALCIUM 8.4 07/10/2020    BILITOT 0.3 07/10/2020    ALKPHOS 109 07/10/2020    AST 12 07/10/2020    ALT 9 07/10/2020       POC Tests: No results for input(s): POCGLU, POCNA, POCK, POCCL, POCBUN, POCHEMO, POCHCT in the last 72 hours. Coags:   Lab Results   Component Value Date    PROTIME 13.2 05/01/2017    INR 1.2 05/01/2017       HCG (If Applicable):   Lab Results   Component Value Date    PREGTESTUR NEGATIVE 10/19/2019        ABGs: No results found for: PHART, PO2ART, WGB5IEC, BAA0JOF, BEART, K0OADKLN     Type & Screen (If Applicable):  No results found for: LABABO, LABRH    Drug/Infectious Status (If Applicable):  No results found for: HIV, HEPCAB    COVID-19 Screening (If Applicable): No results found for: COVID19      Anesthesia Evaluation  Patient summary reviewed no history of anesthetic complications:   Airway: Mallampati: III  TM distance: <3 FB     Mouth opening: > = 3 FB Dental:          Pulmonary: breath sounds clear to auscultation                            ROS comment: Probable PABLO based on body habitus and h/o snoring;  Denies having sleep study   Cardiovascular:Negative CV ROS            Rhythm: regular  Rate: normal                    Neuro/Psych:   Negative Neuro/Psych ROS              GI/Hepatic/Renal: Neg GI/Hepatic/Renal ROS            Endo/Other:    (+) Diabetes, . Abdominal:   (+) obese,         Vascular: negative vascular ROS. Anesthesia Plan      MAC     ASA 3     (Backup GA if needed)  Induction: intravenous. Anesthetic plan and risks discussed with patient. Plan discussed with CRNA.                   Thomes Kayser, MD   7/10/2020

## 2020-07-10 NOTE — ANESTHESIA POSTPROCEDURE EVALUATION
Department of Anesthesiology  Postprocedure Note    Patient: Aniceto Lyman  MRN: [id number]  YOB: 1985  Date of evaluation: 7/10/2020  Time:  3:21 PM     Procedure Summary     Date:  07/10/20 Room / Location:  [facility] 09 / [facility]    Anesthesia Start:  0789 Anesthesia Stop:  4461    Procedure:  RIGHT FOOT INCISION AND DRAINAGE WITH AMPUTATION 3RD TOE, DELAY PRIMARY CLOSURE (Right Foot) Diagnosis:  (.)    Surgeon:  Sonal Rae DPM Responsible Provider:  Thomes Kayser, MD    Anesthesia Type:  MAC ASA Status:  3          Anesthesia Type: MAC    Gayle Phase I: Gayle Score: 10    Gayle Phase II:      Last vitals: Reviewed and per EMR flowsheets.        Anesthesia Post Evaluation    Patient location during evaluation: PACU  Patient participation: complete - patient participated  Level of consciousness: awake  Airway patency: patent  Nausea & Vomiting: no vomiting and no nausea  Complications: no  Cardiovascular status: hemodynamically stable  Respiratory status: acceptable  Hydration status: stable

## 2020-07-11 LAB
ALBUMIN SERPL-MCNC: 3 G/DL (ref 3.5–5.2)
ALP BLD-CCNC: 107 U/L (ref 35–104)
ALT SERPL-CCNC: 9 U/L (ref 0–32)
ANION GAP SERPL CALCULATED.3IONS-SCNC: 7 MMOL/L (ref 7–16)
AST SERPL-CCNC: 16 U/L (ref 0–31)
BASOPHILS ABSOLUTE: 0.08 E9/L (ref 0–0.2)
BASOPHILS RELATIVE PERCENT: 0.8 % (ref 0–2)
BILIRUB SERPL-MCNC: 0.3 MG/DL (ref 0–1.2)
BUN BLDV-MCNC: 11 MG/DL (ref 6–20)
CALCIUM SERPL-MCNC: 8.8 MG/DL (ref 8.6–10.2)
CHLORIDE BLD-SCNC: 104 MMOL/L (ref 98–107)
CO2: 27 MMOL/L (ref 22–29)
CREAT SERPL-MCNC: 1 MG/DL (ref 0.5–1)
EOSINOPHILS ABSOLUTE: 0.28 E9/L (ref 0.05–0.5)
EOSINOPHILS RELATIVE PERCENT: 3 % (ref 0–6)
GFR AFRICAN AMERICAN: >60
GFR NON-AFRICAN AMERICAN: >60 ML/MIN/1.73
GLUCOSE BLD-MCNC: 162 MG/DL (ref 74–99)
HCT VFR BLD CALC: 29.2 % (ref 34–48)
HEMOGLOBIN: 9.6 G/DL (ref 11.5–15.5)
IMMATURE GRANULOCYTES #: 0.08 E9/L
IMMATURE GRANULOCYTES %: 0.8 % (ref 0–5)
LYMPHOCYTES ABSOLUTE: 2.56 E9/L (ref 1.5–4)
LYMPHOCYTES RELATIVE PERCENT: 27.1 % (ref 20–42)
MAGNESIUM: 2 MG/DL (ref 1.6–2.6)
MCH RBC QN AUTO: 26.8 PG (ref 26–35)
MCHC RBC AUTO-ENTMCNC: 32.9 % (ref 32–34.5)
MCV RBC AUTO: 81.6 FL (ref 80–99.9)
METER GLUCOSE: 113 MG/DL (ref 74–99)
METER GLUCOSE: 126 MG/DL (ref 74–99)
METER GLUCOSE: 150 MG/DL (ref 74–99)
METER GLUCOSE: 168 MG/DL (ref 74–99)
METER GLUCOSE: 178 MG/DL (ref 74–99)
METER GLUCOSE: 199 MG/DL (ref 74–99)
MONOCYTES ABSOLUTE: 0.55 E9/L (ref 0.1–0.95)
MONOCYTES RELATIVE PERCENT: 5.8 % (ref 2–12)
NEUTROPHILS ABSOLUTE: 5.9 E9/L (ref 1.8–7.3)
NEUTROPHILS RELATIVE PERCENT: 62.5 % (ref 43–80)
PDW BLD-RTO: 13.4 FL (ref 11.5–15)
PHOSPHORUS: 3.2 MG/DL (ref 2.5–4.5)
PLATELET # BLD: 325 E9/L (ref 130–450)
PMV BLD AUTO: 11.2 FL (ref 7–12)
POTASSIUM REFLEX MAGNESIUM: 3.6 MMOL/L (ref 3.5–5)
RBC # BLD: 3.58 E12/L (ref 3.5–5.5)
SODIUM BLD-SCNC: 138 MMOL/L (ref 132–146)
TOTAL PROTEIN: 7.1 G/DL (ref 6.4–8.3)
WBC # BLD: 9.5 E9/L (ref 4.5–11.5)

## 2020-07-11 PROCEDURE — 83735 ASSAY OF MAGNESIUM: CPT

## 2020-07-11 PROCEDURE — 6370000000 HC RX 637 (ALT 250 FOR IP): Performed by: PODIATRIST

## 2020-07-11 PROCEDURE — 85025 COMPLETE CBC W/AUTO DIFF WBC: CPT

## 2020-07-11 PROCEDURE — 6360000002 HC RX W HCPCS: Performed by: PODIATRIST

## 2020-07-11 PROCEDURE — 84100 ASSAY OF PHOSPHORUS: CPT

## 2020-07-11 PROCEDURE — 2580000003 HC RX 258: Performed by: SPECIALIST

## 2020-07-11 PROCEDURE — 80053 COMPREHEN METABOLIC PANEL: CPT

## 2020-07-11 PROCEDURE — 6370000000 HC RX 637 (ALT 250 FOR IP): Performed by: INTERNAL MEDICINE

## 2020-07-11 PROCEDURE — 1200000000 HC SEMI PRIVATE

## 2020-07-11 PROCEDURE — 82962 GLUCOSE BLOOD TEST: CPT

## 2020-07-11 PROCEDURE — 2580000003 HC RX 258: Performed by: PODIATRIST

## 2020-07-11 PROCEDURE — 99231 SBSQ HOSP IP/OBS SF/LOW 25: CPT | Performed by: INTERNAL MEDICINE

## 2020-07-11 PROCEDURE — 36415 COLL VENOUS BLD VENIPUNCTURE: CPT

## 2020-07-11 PROCEDURE — 6360000002 HC RX W HCPCS: Performed by: SPECIALIST

## 2020-07-11 RX ORDER — INSULIN GLARGINE 100 [IU]/ML
25 INJECTION, SOLUTION SUBCUTANEOUS NIGHTLY
Status: DISCONTINUED | OUTPATIENT
Start: 2020-07-11 | End: 2020-07-13 | Stop reason: HOSPADM

## 2020-07-11 RX ORDER — POTASSIUM CHLORIDE 20 MEQ/1
40 TABLET, EXTENDED RELEASE ORAL ONCE
Status: COMPLETED | OUTPATIENT
Start: 2020-07-11 | End: 2020-07-11

## 2020-07-11 RX ORDER — INSULIN GLARGINE 100 [IU]/ML
30 INJECTION, SOLUTION SUBCUTANEOUS NIGHTLY
Status: DISCONTINUED | OUTPATIENT
Start: 2020-07-11 | End: 2020-07-11

## 2020-07-11 RX ADMIN — ASPIRIN 81 MG: 81 TABLET, COATED ORAL at 08:30

## 2020-07-11 RX ADMIN — INSULIN LISPRO 2 UNITS: 100 INJECTION, SOLUTION INTRAVENOUS; SUBCUTANEOUS at 17:27

## 2020-07-11 RX ADMIN — Medication 10 ML: at 21:08

## 2020-07-11 RX ADMIN — Medication 10 ML: at 08:37

## 2020-07-11 RX ADMIN — METOCLOPRAMIDE 5 MG: 5 TABLET ORAL at 12:29

## 2020-07-11 RX ADMIN — GABAPENTIN 600 MG: 300 CAPSULE ORAL at 17:37

## 2020-07-11 RX ADMIN — FAMOTIDINE 20 MG: 20 TABLET ORAL at 08:30

## 2020-07-11 RX ADMIN — METOCLOPRAMIDE 5 MG: 5 TABLET ORAL at 17:37

## 2020-07-11 RX ADMIN — HYDROCODONE BITARTRATE AND ACETAMINOPHEN 1 TABLET: 10; 325 TABLET ORAL at 20:44

## 2020-07-11 RX ADMIN — INSULIN LISPRO 5 UNITS: 100 INJECTION, SOLUTION INTRAVENOUS; SUBCUTANEOUS at 12:29

## 2020-07-11 RX ADMIN — INSULIN LISPRO 5 UNITS: 100 INJECTION, SOLUTION INTRAVENOUS; SUBCUTANEOUS at 17:27

## 2020-07-11 RX ADMIN — CILOSTAZOL 100 MG: 100 TABLET ORAL at 20:44

## 2020-07-11 RX ADMIN — FAMOTIDINE 20 MG: 20 TABLET ORAL at 20:44

## 2020-07-11 RX ADMIN — INSULIN LISPRO 5 UNITS: 100 INJECTION, SOLUTION INTRAVENOUS; SUBCUTANEOUS at 08:40

## 2020-07-11 RX ADMIN — SODIUM CHLORIDE, PRESERVATIVE FREE 100 UNITS: 5 INJECTION INTRAVENOUS at 08:31

## 2020-07-11 RX ADMIN — ENOXAPARIN SODIUM 40 MG: 40 INJECTION SUBCUTANEOUS at 08:29

## 2020-07-11 RX ADMIN — ONDANSETRON HYDROCHLORIDE 4 MG: 2 SOLUTION INTRAMUSCULAR; INTRAVENOUS at 04:19

## 2020-07-11 RX ADMIN — PANTOPRAZOLE SODIUM 40 MG: 40 TABLET, DELAYED RELEASE ORAL at 06:37

## 2020-07-11 RX ADMIN — INSULIN LISPRO 1 UNITS: 100 INJECTION, SOLUTION INTRAVENOUS; SUBCUTANEOUS at 20:33

## 2020-07-11 RX ADMIN — METOCLOPRAMIDE 5 MG: 5 TABLET ORAL at 06:37

## 2020-07-11 RX ADMIN — POTASSIUM CHLORIDE 40 MEQ: 1500 TABLET, EXTENDED RELEASE ORAL at 08:31

## 2020-07-11 RX ADMIN — CILOSTAZOL 100 MG: 100 TABLET ORAL at 08:30

## 2020-07-11 RX ADMIN — INSULIN GLARGINE 25 UNITS: 100 INJECTION, SOLUTION SUBCUTANEOUS at 20:37

## 2020-07-11 RX ADMIN — ERTAPENEM SODIUM 1000 MG: 1 INJECTION, POWDER, LYOPHILIZED, FOR SOLUTION INTRAMUSCULAR; INTRAVENOUS at 15:30

## 2020-07-11 RX ADMIN — SODIUM CHLORIDE, PRESERVATIVE FREE 100 UNITS: 5 INJECTION INTRAVENOUS at 21:07

## 2020-07-11 RX ADMIN — GABAPENTIN 600 MG: 300 CAPSULE ORAL at 08:30

## 2020-07-11 RX ADMIN — METOCLOPRAMIDE 5 MG: 5 TABLET ORAL at 20:44

## 2020-07-11 ASSESSMENT — PAIN SCALES - GENERAL: PAINLEVEL_OUTOF10: 9

## 2020-07-11 NOTE — PROGRESS NOTES
[facility]  Internal Medicine Residency / [address]    Attending Physician Statement  I have discussed the case, including pertinent history and exam findings with the resident and the team.  I have seen and examined the patient and the key elements of the encounter have been performed by me. I agree with the assessment, plan and orders as documented by the resident. Case Discussed During AM Rounds   S/p intervention on 7/10    Feeling well   Glucose improved control   Avoid hypoglycemia   Reviewed notes and awaiting [facility] availability prior to DC    Discussed hydrocortisone role per Dr. Sohan Flaherty recommendations with patient- all questions answered     Ischemic right 3rd toe    Vascular/Podiatry following   S/p intervention on 7/10     Diabetic Foot ulceration with secondary infection    ID/Podiatry following    Atbs continued   S/P I & D     Insulin Dependent Diabetes- uncontrolled   Insulin intensification this am    Monitor for hypoglycemia     Abdominal pain- ? Gastroparesis- currently controlled   Reglan trial initiated inpatient    ? Adrenal Insufficiency    Hydrocortisone discontinued by House team on 7/8    Monitor off hydrocortisone for now per Dr. Sohan Flaherty    Need to consider readministration if symptoms   Possible need for stress dose in stress state in future     Disposition- continue inpatient management     Remainder of medical problems as per resident note.       Josiah Arias  Internal Medicine Residency Faculty

## 2020-07-11 NOTE — PROGRESS NOTES
[facility]  Internal Medicine Residency Program  Progress Note - House Team 2    Patient:  Kadie Zuniga 37 y.o. female MRN: [id number]     Date of Service: 7/11/2020     CC: had concerns including Abdominal Pain (RUQ pain, onset a couple of hours ago, with nausea. states had similar issue sometime in the alst couple months and was seen in the ER for it but states she was never told what it was) and Dizziness (onset at the same time as her abd pain). Overnight events:     Subjective     Seen and examined patient in the am. Reports feeling well. appetite is good without abdominal pain . Tolerating diet and doesn't complain of nausea or vomiting. Is not feeling dizzy as well. Orthostatic vitals were done and is unremarkable. Patient had questions about future hydrocortisone use, all questions answered. Status post right third toe amputation on 7/10/2020 and I&D at the same time. Currently is getting Invanz. Due to home health care issues patient discharge is being held till Monday. Objective     Physical Exam:  · Vitals: /76   Pulse 88   Temp 98 °F (36.7 °C) (Temporal)   Resp 18   Ht 5' 2\" (1.575 m)   Wt 268 lb 6.4 oz (121.7 kg)   SpO2 97%   BMI 49.09 kg/m²     I & O - 24hr: I/O this shift:  In: 240 [P.O.:240]  · Out: -    · General Appearance: alert, appears stated age, cooperative, moderate distress and morbidly obese  · HEENT:  Head: Normal, normocephalic, atraumatic. · Neck: no adenopathy and supple, symmetrical, trachea midline  · Lung: clear to auscultation bilaterally  · Heart: regular rate and rhythm, S1, S2 normal, no murmur, click, rub or gallop  · Abdomen: soft, non-tender; bowel sounds normal; no masses,  no organomegaly, obese. · Extremities: Right foot wrapped in dressing, and elevated.   · Neurologic: Mental status: Alert, oriented, thought content appropriate  Subject  Pertinent Labs & Imaging Studies   winnie  CBC:   Lab Results   Component Value Date    WBC 9.5 2020    RBC 3.58 2020    HGB 9.6 2020    HCT 29.2 2020    MCV 81.6 2020    MCH 26.8 2020    MCHC 32.9 2020    RDW 13.4 2020     2020    MPV 11.2 2020     CMP:    Lab Results   Component Value Date     2020    K 3.6 2020     2020    CO2 27 2020    BUN 11 2020    CREATININE 1.0 2020    GFRAA >60 2020    LABGLOM >60 2020    GLUCOSE 162 2020    PROT 7.1 2020    LABALBU 3.0 2020    CALCIUM 8.8 2020    BILITOT 0.3 2020    ALKPHOS 107 2020    AST 16 2020    ALT 9 2020     U/A:  No components found for: Malena Barrera, Phil, ALONSO, Tamir Taylor, Michelle, ARMANDO, Jarod Weeks, Marty, [place], [place], [facility], [place], [place]         Narrative   Patient MRN: [id number]   : 1985   Age:  36 years   Gender: Female   Order Date: 2020 3:30 PM   Exam: XR CHEST PORTABLE   Indication:   PNA    PNA    Comparison: Chest radiographs, 3/5/2020        FINDINGS:       The lungs are clear. The cardiomediastinal contour is unremarkable. No   pneumothorax or pleural effusion. Evaluation of the bones reveals no   fracture or destructive lesion.           Impression   No acute cardiopulmonary abnormality. Narrative   Comparison:       X-rays of the right foot 2020.       Clinical indications:       Patient is unable to flex toes. Sore on second digit of right foot. Osteomyelitis.       TECHNIQUE:       Multiplanar multisequence MRI of the right foot was performed without   contrast.       There is considerable decreased T1 and increased T2 signal within the   soft tissues of the dorsum of the midfoot extending into the mid and   distal foot as well.  A 12 x 10 x 5 mm fluid collection along the   dorsal surface of the foot wedged between the second and third   metatarsal heads which might represent right third toe amputation on 7/10/2020               -Likely due to aggressive debridement of the abscess may have               caused injury to the small arteries of the toe                -Right foot is wrapped on dressing               -Continue to elevate foot     4. IDDM type II               -Patient's blood sugars have been better controlled after discontinuation of hydrocortisone               -Back off on Lantus to 25 units nightly as patient's fasting blood sugar is<140 mg/dl     4. Chronic Adrenal Insufficiency- Hypotension and dizziness. Receives hydrocortisone 15mg at home. /55, .    -Cosyntropin stim test have been suboptimal               -In the background of being asymptomatic along with satisfactory blood pressure and challenging management of diabetes, patient's hydrocortisone has been decided to be discontinued, however if patient has intercurrent illness she may need stress dose      5. Leukocytosis- resolved         7. H/o GERD- on Pepcid 20mg PO BID. PT/OT evaluation:  DVT prophylaxis/ GI prophylaxis: Lovenox/Pepcid  Disposition: continue with care.     Ramona García MD, PGY-2  Attending physician: Dr. Ronen Tse MD

## 2020-07-11 NOTE — PROGRESS NOTES
[address] [place] [address] [facility]  [facility]  Progress Note    SUBJECTIVE:  Chief Complaint   Patient presents with    Abdominal Pain     RUQ pain, onset a couple of hours ago, with nausea. states had similar issue sometime in the alst couple months and was seen in the ER for it but states she was never told what it was    Dizziness     onset at the same time as her abd pain     Patient is seen and examined at bedside. She is awake and alert. Patient is tolerating medications. No reported adverse drug reactions. No nausea, vomiting, diarrhea. Afebrile  Status post on 7/10/2020  1. Amputation of right third toe to level of the metatarsophalangeal  joint. 2.  Incision and drainage of right foot with delayed primary closure of  right foot. Review of systems:  As stated above in the chief complaint, otherwise negative.     Medications:  Scheduled Meds:   insulin glargine  30 Units Subcutaneous Nightly    insulin lispro  0-12 Units Subcutaneous TID WC    insulin lispro  0-6 Units Subcutaneous Nightly    ertapenem (INVanz) IVPB  1 g Intravenous Q24H    insulin lispro  5 Units Subcutaneous TID AC    metoclopramide  5 mg Oral 4x Daily AC & HS    aspirin  81 mg Oral Daily    cilostazol  100 mg Oral BID    gabapentin  600 mg Oral BID WC    pantoprazole  40 mg Oral QAM AC    lidocaine  5 mL Intradermal Once    heparin flush  1 mL Intravenous 2 times per day    famotidine  20 mg Oral BID    enoxaparin  40 mg Subcutaneous Daily    sodium chloride flush  10 mL Intravenous 2 times per day    sodium chloride  500 mL Intravenous Once     Continuous Infusions:   dextrose       PRN Meds:HYDROcodone-acetaminophen, ibuprofen **AND** acetaminophen, sodium chloride flush, heparin flush, sodium chloride flush, acetaminophen **OR** acetaminophen, polyethylene glycol, promethazine **OR** ondansetron, glucose, dextrose, glucagon (rDNA), dextrose    OBJECTIVE:  /76   Pulse 88   Temp 98 °F (36.7 °C) (Temporal)   Resp 18   Ht 5' 2\" (1.575 m)   Wt 268 lb 6.4 oz (121.7 kg)   SpO2 97%   BMI 49.09 kg/m²   Temp  Av.1 °F (36.2 °C)  Min: 96 °F (35.6 °C)  Max: 98 °F (36.7 °C)  Constitutional: The patient is awake, alert, and oriented. Skin: Warm and dry. No rashes were noted. HEENT: Round and reactive pupils. Moist mucous membranes. No ulcerations or thrush. Neck: Supple to movements. Chest: No use of accessory muscles to breathe. Symmetrical expansion. No wheezing, crackles or rhonchi. Cardiovascular: S1 and S2 are rhythmic and regular. No murmurs appreciated. Abdomen: Positive bowel sounds to auscultation. Benign to palpation. No masses felt. No hepatosplenomegaly. Extremities: Right foot dressed with dressing--third toe amputation site with sutures clean, dry and intact.   Neurological: [place]drop on the right and neuropathy  Lines:Midline    Laboratory and Tests Review:  Lab Results   Component Value Date    WBC 9.5 2020    WBC 9.0 07/10/2020    WBC 9.0 2020    HGB 9.6 (L) 2020    HCT 29.2 (L) 2020    MCV 81.6 2020     2020     Lab Results   Component Value Date    NEUTROABS 5.90 2020    NEUTROABS 5.50 07/10/2020    NEUTROABS 5.82 2020     No results found for: [facility]  Lab Results   Component Value Date    ALT 9 2020    AST 16 2020    ALKPHOS 107 (H) 2020    BILITOT 0.3 2020     Lab Results   Component Value Date     2020    K 3.6 2020     2020    CO2 27 2020    BUN 11 2020    CREATININE 1.0 2020    CREATININE 1.1 07/10/2020    CREATININE 1.1 2020    GFRAA >60 2020    LABGLOM >60 2020    GLUCOSE 162 2020    PROT 7.1 2020    LABALBU 3.0 2020    CALCIUM 8.8 2020    BILITOT 0.3 2020    ALKPHOS 107 2020    AST 16 2020    ALT 9 2020     Lab Results   Component Value Date    CRP 7.7 (H) 2020    CRP 5.6 (H) 06/27/2020    CRP 1.8 (H) 03/06/2020     Lab Results   Component Value Date    SEDRATE 96 (H) 07/05/2020    SEDRATE 97 (H) 06/27/2020    SEDRATE 46 (H) 03/06/2020       Radiology:  Noted    Microbiology:   Blood culture 7/5/2020 20-24 hours no growth  Urine culture 7/5/2020-mixed omi with gram-positive's and gram-negative rods. Surgical discharge group B strep  Assessment:  · Soft tissue abscess/ deep space infection right foot between second set and third toe status post right foot incision and drainage of multiple compartments and right second toe bone biopsy on 7/7/2020 and on 7/10/2020- delayed primary closure of the right foot  · Leukocytosis--resolved  · Cyanotic-- right foot third toe --status post amputation of right foot third toe on 7/10/2020     Plan:    · Cont IV Invanz (will need at least 3 weeks of IV antibiotics)--- medication reconciled  · Check cultures from 7/7/2020 group B strep  · Follow surgical pathology from 7/7/2020  · Baseline CRP-7.7, sedimentation rate- 96  · Midline placed 7/7/2020  · Monitor labs  · Podiatry following  · Vascular following  · Arrange for home IV antibiotics  · Will follow with you  · Patient to be discharged possible home Monday- since home IV infusion company can start patient giving IV antibiotics at home from Tuesday.  18 [facility]   7/11/2020     I have discussed the case, including pertinent history and physical  exam findings . I have seen and examined the patient and the key elements of the encounter have been performed by me. I agree with the assessment, plan and orders as documented.       Treatment plan as per my recommendation     Vicky Rosado MD, FACP  7/11/2020  4:36 PM

## 2020-07-11 NOTE — PROGRESS NOTES
Department of Podiatry  Progress Note    SUBJECTIVE:  Patient seen bedside for s/p incision and drainage of right foot and amputation of right 3rd toe (DOS: 7/10/20, Dr. Rafia Buitrago). No acute events overnight. She denies any acute events over night. Patient denies any N/V/D/F/C/SOB/CP and has no other pedal complaints at this time. OBJECTIVE:    Scheduled Meds:   insulin glargine  25 Units Subcutaneous Nightly    insulin lispro  0-12 Units Subcutaneous TID WC    insulin lispro  0-6 Units Subcutaneous Nightly    ertapenem (INVanz) IVPB  1 g Intravenous Q24H    insulin lispro  5 Units Subcutaneous TID AC    metoclopramide  5 mg Oral 4x Daily AC & HS    aspirin  81 mg Oral Daily    cilostazol  100 mg Oral BID    gabapentin  600 mg Oral BID WC    pantoprazole  40 mg Oral QAM AC    lidocaine  5 mL Intradermal Once    heparin flush  1 mL Intravenous 2 times per day    famotidine  20 mg Oral BID    enoxaparin  40 mg Subcutaneous Daily    sodium chloride flush  10 mL Intravenous 2 times per day    sodium chloride  500 mL Intravenous Once     Continuous Infusions:   dextrose       PRN Meds:. HYDROcodone-acetaminophen, ibuprofen **AND** acetaminophen, sodium chloride flush, heparin flush, sodium chloride flush, acetaminophen **OR** acetaminophen, polyethylene glycol, promethazine **OR** ondansetron, glucose, dextrose, glucagon (rDNA), dextrose    Allergies   Allergen Reactions    Rocephin [Ceftriaxone] Rash       /76   Pulse 88   Temp 98 °F (36.7 °C) (Temporal)   Resp 18   Ht 5' 2\" (1.575 m)   Wt 268 lb 6.4 oz (121.7 kg)   SpO2 97%   BMI 49.09 kg/m²       RIGHT LOWER EXTREMITY EXAM:    VASCULAR:  DP and PT pulses are palpable. CFT < 5 seconds B/L to digits 1,2,4, and 5. Warm to warm from the tibial tuberosity to the distal aspect of the digits dorsally. Edema noted to 2nd digit. Mild edema noted diffusely to right foot.      NEUROLOGIC:  Protective sensation is diminished but grossly CONTRAST Additional Contrast? None   Final Result         1. Prominently distended urinary bladder dome at the level of the   umbilicus. Please correlate clinically as to the need for   catheterization. 2. Prominence of the right inguinal lymph nodes. A reactive etiology   is favored. Clinical correlation is needed. 3. Mild hepatomegaly. /76   Pulse 88   Temp 98 °F (36.7 °C) (Temporal)   Resp 18   Ht 5' 2\" (1.575 m)   Wt 268 lb 6.4 oz (121.7 kg)   SpO2 97%   BMI 49.09 kg/m²     LABS:    Recent Labs     07/10/20  0500 07/11/20  0350   WBC 9.0 9.5   HGB 10.0* 9.6*   HCT 30.8* 29.2*    325        Recent Labs     07/11/20  0350      K 3.6      CO2 27   PHOS 3.2   BUN 11   CREATININE 1.0        Recent Labs     07/10/20  0500 07/11/20  0350   PROT 7.0 7.1         ASSESSMENT:  -S/P repeat incision and drainage of right foot with amputation of 3rd toe (7/10/20, Dr. Nilda Sam)  -Osteomyelitis   -Diabetic foot infection   -Elevated ESR  -Diabetes Mellitus       PLAN:  - Patient was examined and evaluated. - Reviewed patient's recent lab results and pertinent diagnostic imaging.  - Reviewed ancillary service notes. - Pain Control: IV and PO  - ABX per ID   - Vascular following  - Patient to be NWB to right foot   - Final surgical cultures and pathology pending   - Dressing: betadine paint, DSD, ACE. Daily.  - Discussed patient with Dr. Nilda Sam   - Will continue to follow patient while they are in-house.     Discussed with Dr. Nilda Sam

## 2020-07-11 NOTE — PLAN OF CARE
Problem: Pain:  Goal: Pain level will decrease  Description: Pain level will decrease  7/11/2020 1333 by Stephanie Rosen RN  Outcome: Met This Shift  7/11/2020 0328 by Kirk Arrieta RN  Outcome: Met This Shift     Problem: Pain:  Goal: Control of acute pain  Description: Control of acute pain  7/11/2020 1333 by Stephanie Rosen RN  Outcome: Met This Shift  7/11/2020 0328 by Kirk Arrieta RN  Outcome: Met This Shift     Problem: Falls - Risk of:  Goal: Will remain free from falls  Description: Will remain free from falls  7/11/2020 1333 by Stephanie Rosen RN  Outcome: Met This Shift  7/11/2020 0328 by Kirk Arrieta RN  Outcome: Met This Shift

## 2020-07-12 LAB
ALBUMIN SERPL-MCNC: 3 G/DL (ref 3.5–5.2)
ALP BLD-CCNC: 97 U/L (ref 35–104)
ALT SERPL-CCNC: 9 U/L (ref 0–32)
ANION GAP SERPL CALCULATED.3IONS-SCNC: 12 MMOL/L (ref 7–16)
AST SERPL-CCNC: 15 U/L (ref 0–31)
BASOPHILS ABSOLUTE: 0.06 E9/L (ref 0–0.2)
BASOPHILS RELATIVE PERCENT: 0.6 % (ref 0–2)
BILIRUB SERPL-MCNC: 0.3 MG/DL (ref 0–1.2)
BUN BLDV-MCNC: 11 MG/DL (ref 6–20)
CALCIUM SERPL-MCNC: 8.5 MG/DL (ref 8.6–10.2)
CHLORIDE BLD-SCNC: 101 MMOL/L (ref 98–107)
CO2: 26 MMOL/L (ref 22–29)
CREAT SERPL-MCNC: 1.1 MG/DL (ref 0.5–1)
EOSINOPHILS ABSOLUTE: 0.26 E9/L (ref 0.05–0.5)
EOSINOPHILS RELATIVE PERCENT: 2.7 % (ref 0–6)
GFR AFRICAN AMERICAN: >60
GFR NON-AFRICAN AMERICAN: 56 ML/MIN/1.73
GLUCOSE BLD-MCNC: 167 MG/DL (ref 74–99)
HCT VFR BLD CALC: 29 % (ref 34–48)
HEMOGLOBIN: 9.1 G/DL (ref 11.5–15.5)
IMMATURE GRANULOCYTES #: 0.07 E9/L
IMMATURE GRANULOCYTES %: 0.7 % (ref 0–5)
LYMPHOCYTES ABSOLUTE: 2.52 E9/L (ref 1.5–4)
LYMPHOCYTES RELATIVE PERCENT: 26.2 % (ref 20–42)
MAGNESIUM: 1.9 MG/DL (ref 1.6–2.6)
MCH RBC QN AUTO: 25.8 PG (ref 26–35)
MCHC RBC AUTO-ENTMCNC: 31.4 % (ref 32–34.5)
MCV RBC AUTO: 82.2 FL (ref 80–99.9)
METER GLUCOSE: 133 MG/DL (ref 74–99)
METER GLUCOSE: 135 MG/DL (ref 74–99)
METER GLUCOSE: 149 MG/DL (ref 74–99)
METER GLUCOSE: 157 MG/DL (ref 74–99)
METER GLUCOSE: 163 MG/DL (ref 74–99)
MONOCYTES ABSOLUTE: 0.58 E9/L (ref 0.1–0.95)
MONOCYTES RELATIVE PERCENT: 6 % (ref 2–12)
NEUTROPHILS ABSOLUTE: 6.12 E9/L (ref 1.8–7.3)
NEUTROPHILS RELATIVE PERCENT: 63.8 % (ref 43–80)
PDW BLD-RTO: 13.9 FL (ref 11.5–15)
PHOSPHORUS: 3.4 MG/DL (ref 2.5–4.5)
PLATELET # BLD: 344 E9/L (ref 130–450)
PMV BLD AUTO: 11.5 FL (ref 7–12)
POTASSIUM REFLEX MAGNESIUM: 4.1 MMOL/L (ref 3.5–5)
RBC # BLD: 3.53 E12/L (ref 3.5–5.5)
SODIUM BLD-SCNC: 139 MMOL/L (ref 132–146)
TOTAL PROTEIN: 6.7 G/DL (ref 6.4–8.3)
WBC # BLD: 9.6 E9/L (ref 4.5–11.5)

## 2020-07-12 PROCEDURE — 6370000000 HC RX 637 (ALT 250 FOR IP): Performed by: INTERNAL MEDICINE

## 2020-07-12 PROCEDURE — 6370000000 HC RX 637 (ALT 250 FOR IP): Performed by: PODIATRIST

## 2020-07-12 PROCEDURE — 2580000003 HC RX 258: Performed by: SPECIALIST

## 2020-07-12 PROCEDURE — 2580000003 HC RX 258: Performed by: PODIATRIST

## 2020-07-12 PROCEDURE — 80053 COMPREHEN METABOLIC PANEL: CPT

## 2020-07-12 PROCEDURE — 6360000002 HC RX W HCPCS: Performed by: SPECIALIST

## 2020-07-12 PROCEDURE — 6360000002 HC RX W HCPCS: Performed by: PODIATRIST

## 2020-07-12 PROCEDURE — 82962 GLUCOSE BLOOD TEST: CPT

## 2020-07-12 PROCEDURE — 1200000000 HC SEMI PRIVATE

## 2020-07-12 PROCEDURE — 84100 ASSAY OF PHOSPHORUS: CPT

## 2020-07-12 PROCEDURE — 36415 COLL VENOUS BLD VENIPUNCTURE: CPT

## 2020-07-12 PROCEDURE — 99231 SBSQ HOSP IP/OBS SF/LOW 25: CPT | Performed by: INTERNAL MEDICINE

## 2020-07-12 PROCEDURE — 85025 COMPLETE CBC W/AUTO DIFF WBC: CPT

## 2020-07-12 PROCEDURE — 83735 ASSAY OF MAGNESIUM: CPT

## 2020-07-12 RX ADMIN — INSULIN LISPRO 1 UNITS: 100 INJECTION, SOLUTION INTRAVENOUS; SUBCUTANEOUS at 21:22

## 2020-07-12 RX ADMIN — PANTOPRAZOLE SODIUM 40 MG: 40 TABLET, DELAYED RELEASE ORAL at 06:27

## 2020-07-12 RX ADMIN — FAMOTIDINE 20 MG: 20 TABLET ORAL at 21:20

## 2020-07-12 RX ADMIN — METOCLOPRAMIDE 5 MG: 5 TABLET ORAL at 12:56

## 2020-07-12 RX ADMIN — METOCLOPRAMIDE 5 MG: 5 TABLET ORAL at 06:27

## 2020-07-12 RX ADMIN — FAMOTIDINE 20 MG: 20 TABLET ORAL at 09:11

## 2020-07-12 RX ADMIN — INSULIN LISPRO 5 UNITS: 100 INJECTION, SOLUTION INTRAVENOUS; SUBCUTANEOUS at 12:58

## 2020-07-12 RX ADMIN — ENOXAPARIN SODIUM 40 MG: 40 INJECTION SUBCUTANEOUS at 09:11

## 2020-07-12 RX ADMIN — INSULIN LISPRO 5 UNITS: 100 INJECTION, SOLUTION INTRAVENOUS; SUBCUTANEOUS at 09:17

## 2020-07-12 RX ADMIN — GABAPENTIN 600 MG: 300 CAPSULE ORAL at 09:10

## 2020-07-12 RX ADMIN — ACETAMINOPHEN 500 MG: 500 TABLET ORAL at 09:11

## 2020-07-12 RX ADMIN — ASPIRIN 81 MG: 81 TABLET, COATED ORAL at 09:10

## 2020-07-12 RX ADMIN — SODIUM CHLORIDE, PRESERVATIVE FREE 100 UNITS: 5 INJECTION INTRAVENOUS at 09:15

## 2020-07-12 RX ADMIN — Medication 10 ML: at 09:16

## 2020-07-12 RX ADMIN — INSULIN LISPRO 5 UNITS: 100 INJECTION, SOLUTION INTRAVENOUS; SUBCUTANEOUS at 16:56

## 2020-07-12 RX ADMIN — SODIUM CHLORIDE, PRESERVATIVE FREE 100 UNITS: 5 INJECTION INTRAVENOUS at 21:22

## 2020-07-12 RX ADMIN — INSULIN GLARGINE 25 UNITS: 100 INJECTION, SOLUTION SUBCUTANEOUS at 21:20

## 2020-07-12 RX ADMIN — CILOSTAZOL 100 MG: 100 TABLET ORAL at 21:20

## 2020-07-12 RX ADMIN — CILOSTAZOL 100 MG: 100 TABLET ORAL at 09:10

## 2020-07-12 RX ADMIN — Medication 10 ML: at 21:23

## 2020-07-12 RX ADMIN — INSULIN LISPRO 2 UNITS: 100 INJECTION, SOLUTION INTRAVENOUS; SUBCUTANEOUS at 12:57

## 2020-07-12 RX ADMIN — ERTAPENEM SODIUM 1000 MG: 1 INJECTION, POWDER, LYOPHILIZED, FOR SOLUTION INTRAMUSCULAR; INTRAVENOUS at 14:49

## 2020-07-12 RX ADMIN — GABAPENTIN 600 MG: 300 CAPSULE ORAL at 16:56

## 2020-07-12 RX ADMIN — METOCLOPRAMIDE 5 MG: 5 TABLET ORAL at 16:56

## 2020-07-12 RX ADMIN — METOCLOPRAMIDE 5 MG: 5 TABLET ORAL at 21:20

## 2020-07-12 RX ADMIN — INSULIN LISPRO 2 UNITS: 100 INJECTION, SOLUTION INTRAVENOUS; SUBCUTANEOUS at 16:57

## 2020-07-12 ASSESSMENT — PAIN SCALES - GENERAL
PAINLEVEL_OUTOF10: 0
PAINLEVEL_OUTOF10: 3

## 2020-07-12 NOTE — PROGRESS NOTES
[address] [place] [address] [facility]  JESSY  Progress Note    SUBJECTIVE:  Chief Complaint   Patient presents with    Abdominal Pain     RUQ pain, onset a couple of hours ago, with nausea. states had similar issue sometime in the alst couple months and was seen in the ER for it but states she was never told what it was    Dizziness     onset at the same time as her abd pain     Patient is seen and examined at bedside. She is awake and alert. Patient is tolerating medications. No reported adverse drug reactions. No nausea, vomiting, diarrhea. Afebrile  Status post on 7/10/2020  1. Amputation of right third toe to level of the metatarsophalangeal  joint. 2.  Incision and drainage of right foot with delayed primary closure of  right foot. I saw the picture in patient's phone of right foot amputation site sutures from this morning area looks clean with sutures intact. Review of systems:  As stated above in the chief complaint, otherwise negative.     Medications:  Scheduled Meds:   insulin glargine  25 Units Subcutaneous Nightly    insulin lispro  0-12 Units Subcutaneous TID WC    insulin lispro  0-6 Units Subcutaneous Nightly    ertapenem (INVanz) IVPB  1 g Intravenous Q24H    insulin lispro  5 Units Subcutaneous TID AC    metoclopramide  5 mg Oral 4x Daily AC & HS    aspirin  81 mg Oral Daily    cilostazol  100 mg Oral BID    gabapentin  600 mg Oral BID WC    pantoprazole  40 mg Oral QAM AC    lidocaine  5 mL Intradermal Once    heparin flush  1 mL Intravenous 2 times per day    famotidine  20 mg Oral BID    enoxaparin  40 mg Subcutaneous Daily    sodium chloride flush  10 mL Intravenous 2 times per day    sodium chloride  500 mL Intravenous Once     Continuous Infusions:   dextrose       PRN Meds:HYDROcodone-acetaminophen, ibuprofen **AND** acetaminophen, sodium chloride flush, heparin flush, sodium chloride flush, acetaminophen **OR** acetaminophen, polyethylene glycol, 97 07/12/2020    AST 15 07/12/2020    ALT 9 07/12/2020     Lab Results   Component Value Date    CRP 7.7 (H) 07/05/2020    CRP 5.6 (H) 06/27/2020    CRP 1.8 (H) 03/06/2020     Lab Results   Component Value Date    SEDRATE 96 (H) 07/05/2020    SEDRATE 97 (H) 06/27/2020    SEDRATE 46 (H) 03/06/2020       Radiology:  Noted    Microbiology:   Blood culture 7/5/2020 20-24 hours no growth  Urine culture 7/5/2020-mixed omi with gram-positive's and gram-negative rods. Surgical discharge group B strep  Assessment:  · Soft tissue abscess/ deep space infection right foot between second set and third toe status post right foot incision and drainage of multiple compartments and right second toe bone biopsy on 7/7/2020 and on 7/10/2020- delayed primary closure of the right foot  · Leukocytosis--resolved  · Cyanotic-- right foot third toe --status post amputation of right foot third toe on 7/10/2020     Plan:    · Cont IV Invanz (will need at least 3 weeks of IV antibiotics)--- medication reconciled  · Check cultures from 7/7/2020 group B strep  · Follow surgical pathology from 7/7/2020  · Baseline CRP-7.7, sedimentation rate- 96  · Midline placed 7/7/2020  · Monitor labs  · Podiatry following  · Vascular following  · Arrange for home IV antibiotics  · Will follow with you  · Patient to be discharged possible home Monday- since home IV infusion company can start patient giving IV antibiotics at home from Tuesday.  18 [facility], APRN - CNP   7/12/2020     Patient examined along with the nurse practitioner, agree with above, patient with surgical culture positive for group B strep  Patient at present on [address] patient tolerating antibiotic well clinically doing much better    I have discussed the case, including pertinent history and physical  exam findings . I have seen and examined the patient and the key elements of the encounter have been performed by me. I agree with the assessment, plan and orders as documented. Treatment plan as per my recommendation     Erika Grijalva MD, FACP  7/12/2020  11:30 PM

## 2020-07-12 NOTE — PROGRESS NOTES
[facility]  Internal Medicine Residency Program  Progress Note - House Team 2    Patient:  Ulises Virk 37 y.o. female MRN: [id number]     Date of Service: 7/12/2020     CC: had concerns including Abdominal Pain (RUQ pain, onset a couple of hours ago, with nausea. states had similar issue sometime in the alst couple months and was seen in the ER for it but states she was never told what it was) and Dizziness (onset at the same time as her abd pain). Overnight events:     Subjective     Seen and examined patient in the am. Reports feeling well. appetite is good without abdominal pain . Tolerating diet and doesn't complain of nausea or vomiting. Is not feeling dizzy as well. Status post right third toe amputation on 7/10/2020 and I&D at the same time. Currently is getting Invanz. Due to home health care issues patient discharge is being held till Monday. No overnight issues     Objective     Physical Exam:  · Vitals: /82   Pulse 89   Temp 97.1 °F (36.2 °C) (Temporal)   Resp 18   Ht 5' 2\" (1.575 m)   Wt 268 lb 7 oz (121.8 kg)   SpO2 95%   BMI 49.10 kg/m²     · I & O - 24hr: No intake/output data recorded. · General Appearance: alert, appears stated age, cooperative, moderate distress and morbidly obese  · HEENT:  Head: Normal, normocephalic, atraumatic. · Neck: no adenopathy and supple, symmetrical, trachea midline  · Lung: clear to auscultation bilaterally  · Heart: regular rate and rhythm, S1, S2 normal, no murmur, click, rub or gallop  · Abdomen: soft, non-tender; bowel sounds normal; no masses,  no organomegaly, obese. · Extremities: Right foot wrapped in dressing, and elevated. , toes color good   · Neurologic: Mental status: Alert, oriented, thought content appropriate  Subject  Pertinent Labs & Imaging Studies   winnie  CBC:   Lab Results   Component Value Date    WBC 9.6 07/12/2020    RBC 3.53 07/12/2020    HGB 9.1 07/12/2020    HCT 29.0 07/12/2020 MCV 82.2 2020    MCH 25.8 2020    MCHC 31.4 2020    RDW 13.9 2020     2020    MPV 11.5 2020     CMP:    Lab Results   Component Value Date     2020    K 4.1 2020     2020    CO2 26 2020    BUN 11 2020    CREATININE 1.1 2020    GFRAA >60 2020    LABGLOM 56 2020    GLUCOSE 167 2020    PROT 6.7 2020    LABALBU 3.0 2020    CALCIUM 8.5 2020    BILITOT 0.3 2020    ALKPHOS 97 2020    AST 15 2020    ALT 9 2020     U/A:  No components found for: Phil Keller, ALONSO, Kosta Praveen, Michelle, ARMANDO, CARO, Marty, [place], [place], [facility], [place], [place]         Narrative   Patient MRN: [id number]   : 1985   Age:  36 years   Gender: Female   Order Date: 2020 3:30 PM   Exam: XR CHEST PORTABLE   Indication:   PNA    PNA    Comparison: Chest radiographs, 3/5/2020        FINDINGS:       The lungs are clear. The cardiomediastinal contour is unremarkable. No   pneumothorax or pleural effusion. Evaluation of the bones reveals no   fracture or destructive lesion.           Impression   No acute cardiopulmonary abnormality. Narrative   Comparison:       X-rays of the right foot 2020.       Clinical indications:       Patient is unable to flex toes. Sore on second digit of right foot. Osteomyelitis.       TECHNIQUE:       Multiplanar multisequence MRI of the right foot was performed without   contrast.       There is considerable decreased T1 and increased T2 signal within the   soft tissues of the dorsum of the midfoot extending into the mid and   distal foot as well. A 12 x 10 x 5 mm fluid collection along the   dorsal surface of the foot wedged between the second and third   metatarsal heads which might represent a small abscess.  This   examination is performed without contrast. There is loss of field   homogeneity on the sagittal views. There is no definite abnormal   signal intensity within the osseous structures to suggest   osteomyelitis.           Impression       Cellulitis of the mid and distal foot with 12 x 10 x 5 mm abscess   between the second and third metatarsal heads on the dorsal surface of   foot.       No evidence to confirm osteomyelitis.                     Resident's Assessment and Plan     Gayatri Stoll is a 37 y.o. female with PMHx of IDDM & Adrenal Insufficiency presenting with LUQ abdominal pain, nausea, & vomiting with hyperglycemia/DKA and Rt DM foot ulcer. 1. Abdominal Pain- 2/2 ?inflammatory bowel disease vs Gastroparesis vs Chronic Adrenal Insuffiency vs Autoimmune bowel disease (Crohn's/Celiac) vs Doxycycline. Epigastric, LUQ/LLQ, pain after large meals. Elevated ESR/CRP. CAT scan (5-29-20)- small bowel thickening w/i jejunal loops which are mildly dilated; Ileal loops dilated w/o wall thickening; findings most likely reflective of enteritis/Crohn's. Repeat CAT scan (7-5-20): Prominently distended urinary bladder dome; Reactive inguinal LN; mild hepatomegaly.    -Tylenol prn for pain   -Phenergan or zofran prn for n/v    -Reglan for gastroparesis   -Diet: small frequent meals     -Plan is to keep Reglan upon discharge    2. Right Diabetic Foot Ulcer and abscess- IDDM, h/o polyneuropathy and BL foot drop. Recently d/c 6-29-20 from ED on Doxycycline. Foot XR unremarkable (no fracture or osteomyelitis). Leukocytosis, CRP elevated at 7.7 (5.6 on 6-27-20). MRI of the foot does show an abscess between 2nd & 3rd metatarsal on the Rt foot; clinical evidence of osteomyelitis. Vascular studies showed good arterial blood flow to the Rt foot. - s/p repeat I&D on 7/10/2020              -Plan is for 3 weeks of IV antibiotics with Ramo Simpsonw with home health care    3.  Gangrenous 3 rd toe s/p right third toe amputation on 7/10/2020               -Likely due to aggressive debridement of the abscess may have               caused injury to the small arteries of the toe                -Right foot is wrapped on dressing               -Continue to elevate foot     4. IDDM type II               -Patient's blood sugars have been better controlled after discontinuation of hydrocortisone               -Continue lantus 25U and 5U premeal and MDSS               - POCT blood glucose AC and HS     4. Chronic Adrenal Insufficiency- Hypotension and dizziness. Receives hydrocortisone 15mg at home. /55, .    -Cosyntropin stim test have been suboptimal              -In the background of being asymptomatic along with satisfactory blood pressure and challenging management of diabetes, patient's hydrocortisone has been decided to be discontinued, however if patient has intercurrent illness she may need stress dose      5. Leukocytosis- resolved       7. H/o GERD- on Pepcid 20mg PO BID. PT/OT evaluation:  DVT prophylaxis/ GI prophylaxis: Lovenox/Pepcid  Disposition: continue with care.     Gris Patrick MD, PGY-2  Attending physician: Dr. Marva Barthel, MD

## 2020-07-12 NOTE — PROGRESS NOTES
[facility]  Internal Medicine Residency / [address]    Attending Physician Statement  I have discussed the case, including pertinent history and exam findings with the resident and the team.  I have seen and examined the patient and the key elements of the encounter have been performed by me. I agree with the assessment, plan and orders as documented by the resident. Case Discussed During AM Rounds   No overnight events    Tolerating regimen    Await likely DC tomorrow for [employer] to open on 7/14    Ischemic right 3rd toe    Vascular/Podiatry following   S/p intervention on 7/10     Diabetic Foot ulceration with secondary infection    ID/Podiatry following    Atbs continued   S/P I & D     Insulin Dependent Diabetes- uncontrolled   Insulin intensification this am    Monitor for hypoglycemia     Abdominal pain- ? Gastroparesis- currently controlled   Reglan trial initiated inpatient    ? Adrenal Insufficiency    Hydrocortisone discontinued by House team on 7/8    Monitor off hydrocortisone for now per Dr. Lazaro Rooney    Need to consider readministration if symptoms   Possible need for stress dose in stress state in future     Disposition- continue inpatient management     Remainder of medical problems as per resident note.       Lawson Marie  Internal Medicine Residency Faculty

## 2020-07-12 NOTE — PROGRESS NOTES
Department of Podiatry  Progress Note    SUBJECTIVE:  Patient seen bedside for s/p incision and drainage of right foot and amputation of right 3rd toe (DOS: 7/10/20, Dr. Anum Urbina). No acute events overnight. Patient states she is very satisfied with the outcome of her surgery. Patient denies any N/V/D/F/C/SOB/CP and has no other pedal complaints at this time. OBJECTIVE:    Scheduled Meds:   insulin glargine  25 Units Subcutaneous Nightly    insulin lispro  0-12 Units Subcutaneous TID WC    insulin lispro  0-6 Units Subcutaneous Nightly    ertapenem (INVanz) IVPB  1 g Intravenous Q24H    insulin lispro  5 Units Subcutaneous TID AC    metoclopramide  5 mg Oral 4x Daily AC & HS    aspirin  81 mg Oral Daily    cilostazol  100 mg Oral BID    gabapentin  600 mg Oral BID WC    pantoprazole  40 mg Oral QAM AC    lidocaine  5 mL Intradermal Once    heparin flush  1 mL Intravenous 2 times per day    famotidine  20 mg Oral BID    enoxaparin  40 mg Subcutaneous Daily    sodium chloride flush  10 mL Intravenous 2 times per day    sodium chloride  500 mL Intravenous Once     Continuous Infusions:   dextrose       PRN Meds:. HYDROcodone-acetaminophen, ibuprofen **AND** acetaminophen, sodium chloride flush, heparin flush, sodium chloride flush, acetaminophen **OR** acetaminophen, polyethylene glycol, promethazine **OR** ondansetron, glucose, dextrose, glucagon (rDNA), dextrose    Allergies   Allergen Reactions    Rocephin [Ceftriaxone] Rash       /82   Pulse 89   Temp 97.1 °F (36.2 °C) (Temporal)   Resp 18   Ht 5' 2\" (1.575 m)   Wt 268 lb 7 oz (121.8 kg)   SpO2 95%   BMI 49.10 kg/m²       RIGHT LOWER EXTREMITY EXAM:    VASCULAR:  DP and PT pulses are palpable. CFT < 5 seconds B/L to digits 1,2,4, and 5. Warm to warm from the tibial tuberosity to the distal aspect of the digits dorsally. Edema noted to 2nd digit, but improved since yesterday. Mild edema noted diffusely to right foot, but improving .

## 2020-07-12 NOTE — PLAN OF CARE
Problem: Pain:  Goal: Pain level will decrease  Description: Pain level will decrease  7/12/2020 1103 by Krystal Eisenmenger, RN  Outcome: Met This Shift  7/12/2020 1103 by Krystal Eisenmenger, RN  Outcome: Met This Shift  7/12/2020 0055 by Madhu Valverde RN  Outcome: Met This Shift     Problem: Pain:  Goal: Control of acute pain  Description: Control of acute pain  7/12/2020 1103 by Krystal Eisenmenger, RN  Outcome: Met This Shift  7/12/2020 1103 by Krystal Eisenmenger, RN  Outcome: Met This Shift  7/12/2020 0055 by Madhu Valverde RN  Outcome: Met This Shift     Problem: Falls - Risk of:  Goal: Will remain free from falls  Description: Will remain free from falls  7/12/2020 1103 by Krystal Eisenmenger, RN  Outcome: Met This Shift  7/12/2020 1103 by Krystal Eisenmenger, RN  Outcome: Met This Shift

## 2020-07-13 ENCOUNTER — APPOINTMENT (OUTPATIENT)
Dept: HYPERBARIC MEDICINE | Age: 35
DRG: 314 | End: 2020-07-13
Payer: COMMERCIAL

## 2020-07-13 VITALS
TEMPERATURE: 97.1 F | BODY MASS INDEX: 49.32 KG/M2 | DIASTOLIC BLOOD PRESSURE: 59 MMHG | SYSTOLIC BLOOD PRESSURE: 104 MMHG | OXYGEN SATURATION: 94 % | WEIGHT: 268 LBS | HEIGHT: 62 IN | RESPIRATION RATE: 18 BRPM | HEART RATE: 95 BPM

## 2020-07-13 LAB
ALBUMIN SERPL-MCNC: 3 G/DL (ref 3.5–5.2)
ALP BLD-CCNC: 102 U/L (ref 35–104)
ALT SERPL-CCNC: 10 U/L (ref 0–32)
ANION GAP SERPL CALCULATED.3IONS-SCNC: 10 MMOL/L (ref 7–16)
AST SERPL-CCNC: 17 U/L (ref 0–31)
BASOPHILS ABSOLUTE: 0.06 E9/L (ref 0–0.2)
BASOPHILS RELATIVE PERCENT: 0.6 % (ref 0–2)
BILIRUB SERPL-MCNC: 0.3 MG/DL (ref 0–1.2)
BUN BLDV-MCNC: 13 MG/DL (ref 6–20)
CALCIUM SERPL-MCNC: 8.9 MG/DL (ref 8.6–10.2)
CHLORIDE BLD-SCNC: 98 MMOL/L (ref 98–107)
CO2: 28 MMOL/L (ref 22–29)
CREAT SERPL-MCNC: 1 MG/DL (ref 0.5–1)
EOSINOPHILS ABSOLUTE: 0.28 E9/L (ref 0.05–0.5)
EOSINOPHILS RELATIVE PERCENT: 2.9 % (ref 0–6)
GFR AFRICAN AMERICAN: >60
GFR NON-AFRICAN AMERICAN: >60 ML/MIN/1.73
GLUCOSE BLD-MCNC: 245 MG/DL (ref 74–99)
HCT VFR BLD CALC: 28.8 % (ref 34–48)
HEMOGLOBIN: 9.3 G/DL (ref 11.5–15.5)
IMMATURE GRANULOCYTES #: 0.06 E9/L
IMMATURE GRANULOCYTES %: 0.6 % (ref 0–5)
LYMPHOCYTES ABSOLUTE: 2.59 E9/L (ref 1.5–4)
LYMPHOCYTES RELATIVE PERCENT: 26.5 % (ref 20–42)
MAGNESIUM: 1.9 MG/DL (ref 1.6–2.6)
MCH RBC QN AUTO: 26.6 PG (ref 26–35)
MCHC RBC AUTO-ENTMCNC: 32.3 % (ref 32–34.5)
MCV RBC AUTO: 82.3 FL (ref 80–99.9)
METER GLUCOSE: 201 MG/DL (ref 74–99)
METER GLUCOSE: 223 MG/DL (ref 74–99)
MONOCYTES ABSOLUTE: 0.56 E9/L (ref 0.1–0.95)
MONOCYTES RELATIVE PERCENT: 5.7 % (ref 2–12)
NEUTROPHILS ABSOLUTE: 6.22 E9/L (ref 1.8–7.3)
NEUTROPHILS RELATIVE PERCENT: 63.7 % (ref 43–80)
PDW BLD-RTO: 13.9 FL (ref 11.5–15)
PHOSPHORUS: 3.3 MG/DL (ref 2.5–4.5)
PLATELET # BLD: 385 E9/L (ref 130–450)
PMV BLD AUTO: 11.3 FL (ref 7–12)
POTASSIUM REFLEX MAGNESIUM: 4 MMOL/L (ref 3.5–5)
RBC # BLD: 3.5 E12/L (ref 3.5–5.5)
SODIUM BLD-SCNC: 136 MMOL/L (ref 132–146)
TOTAL PROTEIN: 7 G/DL (ref 6.4–8.3)
WBC # BLD: 9.8 E9/L (ref 4.5–11.5)

## 2020-07-13 PROCEDURE — 6360000002 HC RX W HCPCS: Performed by: SPECIALIST

## 2020-07-13 PROCEDURE — 6360000002 HC RX W HCPCS: Performed by: STUDENT IN AN ORGANIZED HEALTH CARE EDUCATION/TRAINING PROGRAM

## 2020-07-13 PROCEDURE — 84100 ASSAY OF PHOSPHORUS: CPT

## 2020-07-13 PROCEDURE — 85025 COMPLETE CBC W/AUTO DIFF WBC: CPT

## 2020-07-13 PROCEDURE — 6370000000 HC RX 637 (ALT 250 FOR IP): Performed by: PODIATRIST

## 2020-07-13 PROCEDURE — 36415 COLL VENOUS BLD VENIPUNCTURE: CPT

## 2020-07-13 PROCEDURE — 6360000002 HC RX W HCPCS: Performed by: PODIATRIST

## 2020-07-13 PROCEDURE — 99238 HOSP IP/OBS DSCHRG MGMT 30/<: CPT | Performed by: INTERNAL MEDICINE

## 2020-07-13 PROCEDURE — 82962 GLUCOSE BLOOD TEST: CPT

## 2020-07-13 PROCEDURE — 2580000003 HC RX 258: Performed by: SPECIALIST

## 2020-07-13 PROCEDURE — 80053 COMPREHEN METABOLIC PANEL: CPT

## 2020-07-13 PROCEDURE — 99232 SBSQ HOSP IP/OBS MODERATE 35: CPT | Performed by: SURGERY

## 2020-07-13 PROCEDURE — 83735 ASSAY OF MAGNESIUM: CPT

## 2020-07-13 PROCEDURE — 2580000003 HC RX 258: Performed by: PODIATRIST

## 2020-07-13 PROCEDURE — 99213 OFFICE O/P EST LOW 20 MIN: CPT

## 2020-07-13 RX ORDER — ASPIRIN 81 MG/1
81 TABLET ORAL DAILY
Qty: 30 TABLET | Refills: 3 | Status: SHIPPED | OUTPATIENT
Start: 2020-07-14 | End: 2020-10-09 | Stop reason: SDUPTHER

## 2020-07-13 RX ORDER — CILOSTAZOL 100 MG/1
100 TABLET ORAL 2 TIMES DAILY
Qty: 60 TABLET | Refills: 2 | Status: SHIPPED | OUTPATIENT
Start: 2020-07-13 | End: 2020-10-09 | Stop reason: SDUPTHER

## 2020-07-13 RX ORDER — MAGNESIUM SULFATE IN WATER 40 MG/ML
2 INJECTION, SOLUTION INTRAVENOUS ONCE
Status: COMPLETED | OUTPATIENT
Start: 2020-07-13 | End: 2020-07-13

## 2020-07-13 RX ADMIN — FAMOTIDINE 20 MG: 20 TABLET ORAL at 08:37

## 2020-07-13 RX ADMIN — HYDROCODONE BITARTRATE AND ACETAMINOPHEN 1 TABLET: 10; 325 TABLET ORAL at 00:13

## 2020-07-13 RX ADMIN — METOCLOPRAMIDE 5 MG: 5 TABLET ORAL at 05:43

## 2020-07-13 RX ADMIN — INSULIN LISPRO 4 UNITS: 100 INJECTION, SOLUTION INTRAVENOUS; SUBCUTANEOUS at 12:23

## 2020-07-13 RX ADMIN — IBUPROFEN 400 MG: 400 TABLET, FILM COATED ORAL at 02:38

## 2020-07-13 RX ADMIN — PANTOPRAZOLE SODIUM 40 MG: 40 TABLET, DELAYED RELEASE ORAL at 05:43

## 2020-07-13 RX ADMIN — INSULIN LISPRO 5 UNITS: 100 INJECTION, SOLUTION INTRAVENOUS; SUBCUTANEOUS at 12:25

## 2020-07-13 RX ADMIN — MAGNESIUM SULFATE HEPTAHYDRATE 2 G: 40 INJECTION, SOLUTION INTRAVENOUS at 06:40

## 2020-07-13 RX ADMIN — GABAPENTIN 600 MG: 300 CAPSULE ORAL at 08:37

## 2020-07-13 RX ADMIN — ERTAPENEM SODIUM 1000 MG: 1 INJECTION, POWDER, LYOPHILIZED, FOR SOLUTION INTRAMUSCULAR; INTRAVENOUS at 15:54

## 2020-07-13 RX ADMIN — CILOSTAZOL 100 MG: 100 TABLET ORAL at 08:37

## 2020-07-13 RX ADMIN — Medication 10 ML: at 08:36

## 2020-07-13 RX ADMIN — METOCLOPRAMIDE 5 MG: 5 TABLET ORAL at 12:23

## 2020-07-13 RX ADMIN — INSULIN LISPRO 5 UNITS: 100 INJECTION, SOLUTION INTRAVENOUS; SUBCUTANEOUS at 08:40

## 2020-07-13 RX ADMIN — ENOXAPARIN SODIUM 40 MG: 40 INJECTION SUBCUTANEOUS at 08:36

## 2020-07-13 RX ADMIN — INSULIN LISPRO 4 UNITS: 100 INJECTION, SOLUTION INTRAVENOUS; SUBCUTANEOUS at 08:38

## 2020-07-13 RX ADMIN — SODIUM CHLORIDE, PRESERVATIVE FREE 100 UNITS: 5 INJECTION INTRAVENOUS at 08:36

## 2020-07-13 RX ADMIN — ASPIRIN 81 MG: 81 TABLET, COATED ORAL at 08:37

## 2020-07-13 ASSESSMENT — PAIN SCALES - GENERAL
PAINLEVEL_OUTOF10: 0
PAINLEVEL_OUTOF10: 3
PAINLEVEL_OUTOF10: 6

## 2020-07-13 NOTE — PROGRESS NOTES
Hyperbaric Oxygen tx evaluation completed. tcpO2 results can be found under the media tab. Patient orientation completed, education materials provided and all pt questions addressed. Script for PO ativan provide to pt. Pt will call dept tues 7-14 to schedule tx time.

## 2020-07-13 NOTE — PROGRESS NOTES
Department of Podiatry  Progress Note    SUBJECTIVE:  Patient seen bedside for s/p incision and drainage of right foot and amputation of right 3rd toe (DOS: 7/10/20, Dr. Janes Grullon). No acute events overnight. She states her pain is well controlled. Patient denies any N/V/D/F/C/SOB/CP and has no other pedal complaints at this time. OBJECTIVE:    Scheduled Meds:   insulin glargine  25 Units Subcutaneous Nightly    insulin lispro  0-12 Units Subcutaneous TID WC    insulin lispro  0-6 Units Subcutaneous Nightly    ertapenem (INVanz) IVPB  1 g Intravenous Q24H    insulin lispro  5 Units Subcutaneous TID AC    metoclopramide  5 mg Oral 4x Daily AC & HS    aspirin  81 mg Oral Daily    cilostazol  100 mg Oral BID    gabapentin  600 mg Oral BID WC    pantoprazole  40 mg Oral QAM AC    lidocaine  5 mL Intradermal Once    heparin flush  1 mL Intravenous 2 times per day    famotidine  20 mg Oral BID    enoxaparin  40 mg Subcutaneous Daily    sodium chloride flush  10 mL Intravenous 2 times per day    sodium chloride  500 mL Intravenous Once     Continuous Infusions:   dextrose       PRN Meds:. HYDROcodone-acetaminophen, ibuprofen **AND** acetaminophen, sodium chloride flush, heparin flush, sodium chloride flush, acetaminophen **OR** acetaminophen, polyethylene glycol, promethazine **OR** ondansetron, glucose, dextrose, glucagon (rDNA), dextrose    Allergies   Allergen Reactions    Rocephin [Ceftriaxone] Rash       BP (!) 92/50   Pulse 91   Temp 98.1 °F (36.7 °C) (Temporal)   Resp 16   Ht 5' 2\" (1.575 m)   Wt 268 lb (121.6 kg)   SpO2 93%   BMI 49.02 kg/m²       RIGHT LOWER EXTREMITY EXAM:    VASCULAR:  DP and PT pulses are palpable. CFT < 5 seconds B/L to digits 1,2,4, and 5. Warm to warm from the tibial tuberosity to the distal aspect of the digits dorsally. Edema noted to 2nd digit, but improving. Mild edema noted diffusely to right foot, but improving .      NEUROLOGIC:  Protective sensation is ABDOMEN PELVIS W IV CONTRAST Additional Contrast? None   Final Result         1. Prominently distended urinary bladder dome at the level of the   umbilicus. Please correlate clinically as to the need for   catheterization. 2. Prominence of the right inguinal lymph nodes. A reactive etiology   is favored. Clinical correlation is needed. 3. Mild hepatomegaly. BP (!) 92/50   Pulse 91   Temp 98.1 °F (36.7 °C) (Temporal)   Resp 16   Ht 5' 2\" (1.575 m)   Wt 268 lb (121.6 kg)   SpO2 93%   BMI 49.02 kg/m²     LABS:    Recent Labs     07/12/20  0314 07/13/20  0430   WBC 9.6 9.8   HGB 9.1* 9.3*   HCT 29.0* 28.8*    385        Recent Labs     07/13/20  0430      K 4.0   CL 98   CO2 28   PHOS 3.3   BUN 13   CREATININE 1.0        Recent Labs     07/12/20  0314 07/13/20  0430   PROT 6.7 7.0         ASSESSMENT:  -S/P repeat incision and drainage of right foot with amputation of 3rd toe (7/10/20, Dr. Kerri Ellis)  -Osteomyelitis   -Diabetic foot infection   -Elevated ESR  -Diabetes Mellitus       PLAN:  - Patient was examined and evaluated. - Reviewed patient's recent lab results and pertinent diagnostic imaging.  - Reviewed ancillary service notes. - Pain Control: IV and PO  - ABX per ID   - Vascular following  - Patient to be NWB to right foot   - Surgical cultures show growth of strep agalactiae (Beta Strep Group B)  - Pathology from 7/7/20 is negative for acute OM. Pathology from 7/10/20 pending.   - Dressing: betadine paint, DSD, ACE. Daily.  - Patient is stable for discharge from podiatric standpoint once medically stable   - Patient to follow-up with Dr. Kerri Ellis in clinic 1 week after discharge. - Discussed patient with Dr. Kerri Ellis   - Will continue to follow patient while they are in-house.     Discussed with Dr. Kerri Ellis

## 2020-07-13 NOTE — PROGRESS NOTES
(36.7 °C) (Temporal)   Resp 16   Ht 5' 2\" (1.575 m)   Wt 268 lb (121.6 kg)   SpO2 93%   BMI 49.02 kg/m²   Temp  Av.8 °F (36.6 °C)  Min: 97.5 °F (36.4 °C)  Max: 98.1 °F (36.7 °C)  Constitutional: The patient is awake, alert, and oriented. Skin: Warm and dry. No rashes were noted. HEENT: Round and reactive pupils. Moist mucous membranes. No ulcerations or thrush. Neck: Supple to movements. Chest: No use of accessory muscles to breathe. Symmetrical expansion. No wheezing, crackles or rhonchi. Cardiovascular: S1 and S2 are rhythmic and regular. No murmurs appreciated. Abdomen: Positive bowel sounds to auscultation. Benign to palpation. No masses felt. No hepatosplenomegaly. Extremities: Right foot dressed with dressing--third toe amputation site with sutures clean, dry and intact.   Neurological: Footdrop on the right and neuropathy  Lines:Midline    Laboratory and Tests Review:  Lab Results   Component Value Date    WBC 9.8 2020    WBC 9.6 2020    WBC 9.5 2020    HGB 9.3 (L) 2020    HCT 28.8 (L) 2020    MCV 82.3 2020     2020     Lab Results   Component Value Date    NEUTROABS 6.22 2020    NEUTROABS 6.12 2020    NEUTROABS 5.90 2020     No results found for: [facility]  Lab Results   Component Value Date    ALT 10 2020    AST 17 2020    ALKPHOS 102 2020    BILITOT 0.3 2020     Lab Results   Component Value Date     2020    K 4.0 2020    CL 98 2020    CO2 28 2020    BUN 13 2020    CREATININE 1.0 2020    CREATININE 1.1 2020    CREATININE 1.0 2020    GFRAA >60 2020    LABGLOM >60 2020    GLUCOSE 245 2020    PROT 7.0 2020    LABALBU 3.0 2020    CALCIUM 8.9 2020    BILITOT 0.3 2020    ALKPHOS 102 2020    AST 17 2020    ALT 10 2020     Lab Results   Component Value Date    CRP 7.7 (H) 2020    CRP 5.6 (H) 06/27/2020    CRP 1.8 (H) 03/06/2020     Lab Results   Component Value Date    SEDRATE 96 (H) 07/05/2020    SEDRATE 97 (H) 06/27/2020    SEDRATE 46 (H) 03/06/2020       Radiology:  Noted    Microbiology:   Blood culture 7/5/2020 20-24 hours no growth  Urine culture 7/5/2020-mixed omi with gram-positive's and gram-negative rods. Surgical discharge group B strep  Assessment:  · Soft tissue abscess/ deep space infection right foot between second set and third toe status post right foot incision and drainage of multiple compartments and right second toe bone biopsy on 7/7/2020 and on 7/10/2020- delayed primary closure of the right foot  · Leukocytosis--resolved  · Cyanotic-- right foot third toe --status post amputation of right foot third toe on 7/10/2020     Plan:    · Cont IV Invanz (will need at least 3 weeks of IV antibiotics)--- medication reconciled  · Check cultures from 7/7/2020 group B strep  · Follow surgical pathology from 7/7/2020  · Baseline CRP-7.7, sedimentation rate- 96  · Midline placed 7/7/2020  · Monitor labs  · Podiatry following  · Vascular following  · Arrange for home IV antibiotics  · Will follow with you  · Patient to be discharged  home today- since home IV infusion company can start patient giving IV antibiotics at home from Tuesday.  [address] [place] [address]   7/13/2020       Patient seen and examined. I had a face to face encounter with the patient. Agree with exam.  Assessment and plan as outlined above and directed by me. Addition and corrections were done as deemed appropriate. My exam and plan include: The patient is feeling better. Surgical pathology from 7/7/2024 was negative for osteomyelitis. The patient will be discharged today on Ertapenem for a minimum of 3 weeks.   Instructed to call the office and make a follow-up appointment    Chip Angelica  7/13/2020  4:36 PM

## 2020-07-13 NOTE — PROGRESS NOTES
CLINICAL PHARMACY NOTE: MEDS TO [address] Select Patient?: No  Total # of Prescriptions Filled: 1   The following medications were delivered to the patient:  · Cilostazol 100  Total # of Interventions Completed: 2  Time Spent (min): 15    Additional Documentation:  Patient aware of aspirin 81 OTC

## 2020-07-13 NOTE — CARE COORDINATION
Patient is POD# 3 Amputation of right third toe to level of the metatarsophalangeal joint and Incision and drainage of right foot with delayed primary closure of right foot for Right third toe gangrene. She is NWB to right foot and HBO has been ordered. Per Ryann Adrian from [facility] patient is not eligible for a bedside commode until 2021 due to having one issues in the last five years. They started precert for wheel chair from [employer]. I met with patient in room to discuss transition of care. She said the plan remains home with [facility]. She said she will do her HBO Tx's outpatient and will have either her  or transportation via her [employer] transport her there. I informed her that she is not eligible for a BSC until 2021 and she asked for a private pay price which is $103.02 which she said she cannot afford. She will need home health care orders for CPA, med compliance, PT/OT and IV antibiotics prior to discharge. Script faxed to [employer] at [phone]. Patient said that her  will transport her home at time of discharge. William Mclaughlin RN, CM      Per Jacklyn Fernandes from [employer] [facility], still awaiting precert for wheelchair but patient can discharge home with the wheelchair as long as she is aware that [employer] will have to take it back if M[facility] denies it. RN said she will notify the patient.   William Mclaughlin RN, CM

## 2020-07-13 NOTE — PROGRESS NOTES
Nutrition Assessment     Type and Reason for Visit: Initial    Nutrition Recommendations/Plan: Continue current diet, Start Ensure HP daily    Nutrition Assessment:  Pt at low nutritional risk s/p toe amputation. Noted adm w/ hyperglycemia, no questions regarding diet at this time. Malnutrition Assessment:  Malnutrition Status:  No malnutrition    Current Nutrition Therapies:    DIET CARB CONTROL;    Nutrition Diagnosis:   Increased nutrient needs related to increased demand as evidenced by wounds    Nutrition Interventions:   Food and/or Nutrient Delivery:  Continue Current Diet, Start Oral Nutrition Supplement(Ensure HP daily)  Nutrition Education/Counseling:  (education declined)   Coordination of Nutrition Care:  Continued Inpatient Monitoring    Goals:  Consume >75% meals/ONS       Nutrition Monitoring and Evaluation:   Food/Nutrient Intake Outcomes:  Food and Nutrient Intake, Supplement Intake  Physical Signs/Symptoms Outcomes:  Biochemical Data, Nutrition Focused Physical Findings, Weight, Skin     Discharge Planning:     Too soon to determine     Electronically signed by Guillaume Veloz MS, RD, LD on 7/13/20 at 1:40 PM EDT    Contact: [phone]

## 2020-07-13 NOTE — DISCHARGE SUMMARY
[place] [address]  Department of Internal Medicine   Internal Medicine Residency Program   Discharge Summary    PCP:     Yohan Team:     [facility] Date:7/5/2020  Discharge Date:    Admission Diagnosis:   1. Hyperglycemia   2. Diabetic Ulcer, Right Foot   3. Chronic Adrenal Insufficiency   4. Insulin Dependent Diabetes Mellitus   5. Leukocytosis   6. UTI     Discharge Diagnosis:  1. Insulin Dependent Diabetes Mellitus   2. Right Diabetic Foot Ulcer and abscess s/p debridement and amputation of the third toe of right foot   3. Abdominal pain secondary to intra abdominal infection   4. Chronic Adrenal Insufficiency     Hospital Course: The patient is a 37 y.o female with a past medical history of DM type I, GERD, Chronic Adrenal insuffieciency who presented at the ED with nausea and abdominal pain. She was admitted and worked-up for inflammatory bowel disease vs. Adrenal insufficiency vs. Autoimmune bowel disease vs. Doxycycline gastroparesis. She was started on reglan and zofran. The patient also has concomitant Chronic Adrenal Insufficiency however, since she has not had any episodes of crisis, hydrocortisone was discontinued at this time. She was also noted to have a Diabetic foot ulcer on her right foot and was started on Vancomycin. Further evaluation of the third right toes showed ischemic changes likely secondary to infection. The patient underwent simultaneous debridement and amputation of the the right third toe. The patient was started on IV Invanz which was recommended to be completed for at least 3 weeks. Home care has been arranged to continue debridement of her right foot and administration of IV Invanz.        Significant findings (history and exam, laboratory, radiological, pathology, other tests):  CBC:   Lab Results   Component Value Date    WBC 9.8 07/13/2020    RBC 3.50 07/13/2020    HGB 9.3 07/13/2020    HCT 28.8 07/13/2020    MCV 82.3 07/13/2020    MCH 26.6 07/13/2020 MCHC 32.3 2020    RDW 13.9 2020     2020    MPV 11.3 2020     WBC:    Lab Results   Component Value Date    WBC 9.8 2020     Platelets:    Lab Results   Component Value Date     2020     CMP:    Lab Results   Component Value Date     2020    K 4.0 2020    CL 98 2020    CO2 28 2020    BUN 13 2020    CREATININE 1.0 2020    GFRAA >60 2020    LABGLOM >60 2020    GLUCOSE 245 2020    PROT 7.0 2020    LABALBU 3.0 2020    CALCIUM 8.9 2020    BILITOT 0.3 2020    ALKPHOS 102 2020    AST 17 2020    ALT 10 2020        Xr Foot Right (2 Views)    Result Date: 2020  Patient MRN:  [id number] : 1985 Age: 37 years Gender: Female Order Date:  2020 5:00 PM EXAM: XR FOOT RIGHT (2 VIEWS) NUMBER OF IMAGES:  2 views INDICATION: Diabetic ulceration right second toe COMPARISON: Previous right foot studies May 2, 2020 and 2019 FINDINGS: Bony structures are intact with preservation of alignment and joint spacing. No acute skeletal pathology is noted plantar and Achilles traction spurs of the calcaneus are evident. Soft tissues about the second toe are prominent, and there is some decreased density in the webspace between the second and third toe, without well-defined gas. There is diffuse forefoot soft tissue swelling on the lateral projection on the dorsal and plantar aspect. Swelling and decreased density in the forefoot, with some prominence along the second toe and second-third webspace. Is nonspecific, and if further imaging is felt to BE clinically necessary, noncontrast MRI can identify marrow edema of osteomyelitis as well as localized soft tissue pathology such as abscess formation.     Xr Foot Right (min 3 Views)    Result Date: 2020  Patient MRN:  [id number] : 1985 Age: 37 years Gender: Female Order Date:  2020 4:15 PM EXAM: XR FOOT RIGHT (MIN 3 VIEWS) COMPARISON: 2020 INDICATION: Pain VIEWS: Three views of the foot FINDINGS: No erosive changes evident to suggest radiographic evidence of osteomyelitis. No fracture or dislocation. There is normal tarsal bone alignment. There is plantar calcaneal spurring. Soft tissue swelling is seen at the dorsal aspect of the foot. 1. No radiographic evidence of osteomyelitis. 2. No fracture or dislocation. Ct Abdomen Pelvis W Iv Contrast Additional Contrast? None    Result Date: 2020  Patient MRN:  [id number] : 1985 Age: 37 years Gender: Female Order Date:  2020 6:30 AM EXAM: CT ABDOMEN PELVIS W IV CONTRAST INDICATION:  Epigastric / LUQ abdominal pain Epigastric / LUQ abdominal pain  COMPARISON: CT of the abdomen and pelvis, 2020 Technique: Low-dose CT  acquisition technique included one of following options; 1 . Automated exposure control, 2. Adjustment of MA and or KV according to patient's size or 3. Use of iterative reconstruction. Multiple computerized tomography sections of the abdomen and pelvis with sagittal and coronal MPR reconstructions were obtained from the top of the diaphragm to the pelvis. FINDINGS: LOWER THORAX: Unremarkable. LIVER: Mildly enlarged. The abdomen is partially excluded from the field-of-view. No focal lesion or ductal dilatation. Deboraha Buys GALLBLADDER: Surgically absent. SPLEEN:Unremarkable. ADRENALS:Unremarkable. KIDNEYS:Unremarkable. PANCREAS:Unremarkable. BOWEL:Unremarkable. APPENDIX:Unremarkable. BLADDER: Prominently distended, with the dome at the level of the umbilicus. REPRODUCTIVE ORGANS:Unremarkable. VASCULATURE:No aortic aneurysm. LYMPH NODES: Mild prominence of the right inguinal lymph nodes. BONES:Unremarkable. MISCELLANEOUS:No additional finding. 1. Prominently distended urinary bladder dome at the level of the umbilicus. Please correlate clinically as to the need for catheterization. 2. Prominence of the right inguinal lymph nodes. A reactive etiology is favored. Clinical correlation is needed. 3. Mild hepatomegaly. Xr Chest Portable    Result Date: 2020  Patient MRN: [id number] : 1985 Age:  37 years Gender: Female Order Date: 2020 3:30 PM Exam: XR CHEST PORTABLE Indication:   PNA PNA Comparison: Chest radiographs, 3/5/2020 FINDINGS: The lungs are clear. The cardiomediastinal contour is unremarkable. No pneumothorax or pleural effusion. Evaluation of the bones reveals no fracture or destructive lesion. No acute cardiopulmonary abnormality. Vl Lower Extremity Arterial Segmental Pressures W Ppg Bilateral    Result Date: 2020  Ankle arm index within normal range bilaterally with triphasic ankle Doppler tracings with the good arterial flow to the right foot based upon the pulse volume recordings over the metatarsal and on the left side, moderately diminished pulse volume recordings noted over the left metatarsal compared to the right side. On the right side, flat pulse volume recording noted over the right third toe and on the left side, markedly diminished pulse volume recordings noted over the left fourth toe    Mri Foot Right Wo Contrast    Result Date: 2020  Comparison: X-rays of the right foot 2020. Clinical indications: Patient is unable to flex toes. Sore on second digit of right foot. Osteomyelitis. TECHNIQUE: Multiplanar multisequence MRI of the right foot was performed without contrast. There is considerable decreased T1 and increased T2 signal within the soft tissues of the dorsum of the midfoot extending into the mid and distal foot as well. A 12 x 10 x 5 mm fluid collection along the dorsal surface of the foot wedged between the second and third metatarsal heads which might represent a small abscess. This examination is performed without contrast. There is loss of field homogeneity on the sagittal views.  There is no definite abnormal signal intensity within the osseous structures to suggest osteomyelitis. Cellulitis of the mid and distal foot with 12 x 10 x 5 mm abscess between the second and third metatarsal heads on the dorsal surface of foot. No evidence to confirm osteomyelitis. Pending test results: None    Consults: None    Procedures: None    Condition at discharge: Improved, stable    Disposition: home    Discharge Medications:  Current Discharge Medication List      START taking these medications    Details   aspirin 81 MG EC tablet Take 1 tablet by mouth daily  Qty: 30 tablet, Refills: 3      cilostazol (PLETAL) 100 MG tablet Take 1 tablet by mouth 2 times daily  Qty: 60 tablet, Refills: 2      ertapenem (INVANZ) infusion Infuse 1,000 mg intravenously every 24 hours for 28 days Compound per protocol.   Qty: 76858 mg, Refills: 0         CONTINUE these medications which have NOT CHANGED    Details   cetirizine (ZYRTEC ALLERGY) 10 MG tablet Take 1 tablet by mouth daily  Qty: 30 tablet, Refills: 0      ondansetron (ZOFRAN ODT) 8 MG TBDP disintegrating tablet Place 1 tablet under the tongue every 8 hours as needed for Nausea  Qty: 12 tablet, Refills: 0      Liraglutide (VICTOZA) 18 MG/3ML SOPN SC injection Inject 0.6 mg into the skin daily      insulin glargine (BASAGLAR KWIKPEN) 100 UNIT/ML injection pen Inject 25 Units into the skin nightly  Qty: 5 pen, Refills: 3      insulin lispro (HUMALOG) 100 UNIT/ML injection vial Take 5 units of Humalog with eat meal  plus If blood sugars are 150-200 -add 1 units of Humalog If blood sugars are 201-250 - add 2 units of Humalog If blood sugars are 251-300 - add 3 units of Humalog If blood sugars are 301-350 - add 4 units of Humalog If  above 350 - add 5 unit of humalog  Qty: 1 vial, Refills: 3      raNITIdine HCl (RANITIDINE 150 MAX STRENGTH PO) Take 300 mg by mouth daily      GABAPENTIN PO Take 600 mg by mouth 2 times daily (with meals)       omeprazole (PRILOSEC) 40 MG delayed release capsule Take 1 capsule by mouth daily  Qty: 30 capsule, Refills: 0         STOP taking these medications       doxycycline hyclate (VIBRA-TABS) 100 MG tablet Comments:   Reason for Stopping:         sulfamethoxazole-trimethoprim (BACTRIM DS) 800-160 MG per tablet Comments:   Reason for Stopping:               Activity: activity as tolerated  Diet: diabetic diet    Follow-up appointments:    Please contact your PCP to schedule a follow-up 1 week after discharge.       Patient Instructions:     Threasa Larve to be administered daily and dressing change c/o Ann-Marie Cody M.D., PGY - 1   11:43 AM  7/13/2020    Attending physician: Dr. Jose Cruz James

## 2020-07-14 PROBLEM — T86.828 SKIN FLAP NECROSIS (HCC): Status: ACTIVE | Noted: 2020-07-14

## 2020-07-14 PROBLEM — T86.821 FAILED SKIN GRAFT: Status: ACTIVE | Noted: 2020-07-14

## 2020-07-14 PROBLEM — I96 SKIN FLAP NECROSIS (HCC): Status: ACTIVE | Noted: 2020-07-14

## 2020-07-14 NOTE — ADT AUTH CERT
Diabetes - Care Day 4 (7/8/2020) by Jaswinder Del Valle RN         Review Entered  Review Status    7/10/2020 15:46  Completed        Criteria Review       Care Day: 4 Care Date: 7/8/2020 Level of Care: Intermediate Care    Guideline Day 2    Level Of Care    (X) Floor    Clinical Status    (X) * Hypotension absent    (X) * Mental status at baseline    (X) * Acidosis absent or improved    (X) * Blood glucose under acceptable control or improved    ( ) * Dehydration absent    (X) * Electrolyte abnormalities absent or improved    (X) * Renal function at baseline or improved    Activity    ( ) * Ambulatory    Routes    (X) * Oral hydration, and medications    Interventions    (X) Serum glucose, serum ketones, chemistries, ABGs or venous pH measurements, urinalysis    (X) Bedside glucose monitoring    Medications    (X) * IV insulin absent    ( ) * Outpatient diabetic medication regimen initiated    (X) Subcutaneous insulin    (X) Potassium, phosphorus, and magnesium replacement as indicated    * Milestone    Additional Notes    7/8    day 4       Vitals: BP (!) 109/57   Pulse 77   Temp 96.9 °F (36.1 °C) (Temporal)   Resp 18   Ht 5' 2\" (1.575 m)   Wt 259 lb 6.4 oz (117.7 kg)   SpO2 98%       MEDS-    · insulin lispro 0-18 Units Subcutaneous TID WC    · insulin lispro 0-9 Units Subcutaneous Nightly    · sodium chloride 1,000 mL Intravenous Once    · metoclopramide 5 mg Oral 4x Daily AC & HS    · gabapentin 600 mg Oral BID WC    · pantoprazole 40 mg Oral QAM AC    · insulin glargine 25 Units Subcutaneous Nightly    · lidocaine 5 mL Intradermal Once    · heparin flush 1 mL Intravenous 2 times per day    · famotidine 20 mg Oral BID    · piperacillin-tazobactam 3.375 g Intravenous Q8H      And    · sodium chloride Intravenous Q8H    · enoxaparin 40 mg Subcutaneous Daily    · sodium chloride flush 10 mL Intravenous 2 times per day       ID note-    Assessment:    · Soft tissue abscess/ deep space infection right foot second third toe status post right foot incision and drainage of multiple compartments and right second toe bone biopsy    · Leukocytosis    · Cyanotic-- right foot third toe         Plan:      · Cont Zosyn  (will need at least 3 weeks of IV antibiotics    · Vanco x1 dose ON 07/07/2020    · Check cultures from 7/7/2020    · Follow surgical pathology from 7/7/2020    · Baseline CRP-7.7, sedimentation rate- 96    · Midline placed 7/7/2020    · Monitor labs--CBC with differential, CMP ordered for tomorrow morning    · Podiatry following       **Podiatry note-    ASSESSMENT:    -S/P incision and drainage of right foot with bone biopsy of right 2nd digit    -Ischemic 2nd digit    -Diabetic foot infection    -Elevated ESR    -Leukocytosis         PLAN:    - Patient was examined and evaluated. - Reviewed patient's recent lab results and pertinent diagnostic imaging.    - Reviewed ancillary service notes. - Pain Control: IV and PO    - ABX per ID    - Vascular consulted for cyanotic 3rd digit. Appreciate recommendations    - Surgical cultures and pathology pending    - X-rays: negative for OM    - Dressing: betadine paint, DSD, ACE.  Daily.    - Plan for further surgical debridement and possible toe amputation on Friday       **Vascular note-    Assessment:         1.  Ischemic toe right third with cyanosis, with infection involving the second toe associated with mainly distal small vessel vascular disease, with satisfactory arterial blood flow to both feet based upon ankle Doppler tracings and possible recordings of the metatarsal area    Plan:    I had a long detailed discussed patient, as mentioned above, showed her the actual Doppler tracings and the pulse volume recordings with help of nursing staff, counseled her regarding modification of risk factors including better control of blood glucose levels, weight and diet modification, and management of lipids if they are elevated         Also recommend low-dose

## 2020-07-14 NOTE — CONSULTS
Chief Complaint: Patient seen in the hyperbaric oxygen therapy department for the evaluation of diabetic foot ulcer with osteomyelitis second toe, status post amputation of the third toe, with compromised skin flap, of the amputation site      HPI: This patient, who has diabetes mellitus, diabetic neuropathy, developed diabetic foot infection with abscess formation, underwent incision and drainage and bone biopsy of the second toe of the left foot by her podiatrist Dr. Christiana Cabot, was found to have osteomyelitis, subsequently was for ischemic third toe, underwent amputation of the third toe at the metatarsophalangeal joint with incision drainage of the right foot with delayed primary closure, was referred to the hyperbaric ox therapy department, to evaluate the patient, to enhance wound healing, including healing of the incision site, at the amputation skin flap site    The patient came down, patient denies history of fever chills, overall feeling better, less anxious, agrees to follow blood glucose levels more carefully      Patient denies any focal lateralizing neurological symptoms like loss of speech, vision or loss of function of extremity    Patient can walk a few blocks, and denies any symptoms of rest pain    Allergies   Allergen Reactions    Rocephin [Ceftriaxone] Rash       No current facility-administered medications for this encounter. Current Outpatient Medications   Medication Sig Dispense Refill    aspirin 81 MG EC tablet Take 1 tablet by mouth daily 30 tablet 3    cilostazol (PLETAL) 100 MG tablet Take 1 tablet by mouth 2 times daily 60 tablet 2    ertapenem (INVANZ) infusion Infuse 1,000 mg intravenously every 24 hours for 28 days Compound per protocol.  32746 mg 0    cetirizine (ZYRTEC ALLERGY) 10 MG tablet Take 1 tablet by mouth daily 30 tablet 0    ondansetron (ZOFRAN ODT) 8 MG TBDP disintegrating tablet Place 1 tablet under the tongue every 8 hours as needed for Nausea 12 tablet 0    Liraglutide (VICTOZA) 18 MG/3ML SOPN SC injection Inject 0.6 mg into the skin daily      insulin glargine (BASAGLAR KWIKPEN) 100 UNIT/ML injection pen Inject 25 Units into the skin nightly 5 pen 3    insulin lispro (HUMALOG) 100 UNIT/ML injection vial Take 5 units of Humalog with eat meal  plus If blood sugars are 150-200 -add 1 units of Humalog If blood sugars are 201-250 - add 2 units of Humalog If blood sugars are 251-300 - add 3 units of Humalog If blood sugars are 301-350 - add 4 units of Humalog If  above 350 - add 5 unit of humalog 1 vial 3    raNITIdine HCl (RANITIDINE 150 MAX STRENGTH PO) Take 300 mg by mouth daily      GABAPENTIN PO Take 600 mg by mouth 2 times daily (with meals)       omeprazole (PRILOSEC) 40 MG delayed release capsule Take 1 capsule by mouth daily 30 capsule 0       Past Medical History:   Diagnosis Date    Cellulitis of foot, right 2020    Diabetes mellitus ([address]     Ischemic toe 2020       Past Surgical History:   Procedure Laterality Date     SECTION      CHOLECYSTECTOMY      FOOT DEBRIDEMENT Right 2020    RIGHT 2ND TOE  INCISION AND DRAINAGE [facility] [place] [facility] Right 7/10/2020    RIGHT FOOT INCISION AND DRAINAGE WITH AMPUTATION 3RD TOE, DELAY PRIMARY CLOSURE performed by Tirso Hernandez DPM at [facility] [place]       Family History   Problem Relation Age of Onset    Heart Attack Father        Social History     Socioeconomic History    Marital status:      Spouse name: Not on file    Number of children: Not on file    Years of education: Not on file    Highest education level: Not on file   Occupational History    Not on file   Social Needs    Financial resource strain: Not on file    Food insecurity     Worry: Not on file     Inability: Not on file    Transportation needs     Medical: Not on file     Non-medical: Not on file   Tobacco Use    Smoking status: Passive Smoke Exposure - Never Smoker    drainage. Oropharynx:  Clear, no exudate noted. Neck:  No jugular venous distention, lymphadenopathy or thyromegaly. No evidence of carotid bruit      Lungs:  Clear to ausculation bilaterally. No rhonchi, crackles, wheezes. Heart:  Regular rate and rhythm. No rub or murmur. .    Abdomen:  Soft, non-tender. No masses, organomegaly. Musculoskeletal: No joint effusions, tenderness swelling or warmth. Neuro: Speech is intact. Moving all extremities. No focal motor or sensory deficits. Extremities:  Both feet are warm to touch. The color of both feet is normal.    Patient does have a incision, both dorsally and plantar surface at the site of the amputation of the third toe right foot with some compromise blood flow to the foot slightly discolored skin edges noted based upon transcutaneous oxygen measurements at the amputation site overlying the skin flap, the left second toe looks warm and the left fourth toe also looks satisfactory              Pulses Right  Left    Brachial 3 3    Radial    3=normal   Femoral 2 2  2=diminished   Popliteal    1=barely palpable   Dorsalis pedis    0=absent   Posterior tibial 1-2 1-2  4=aneurysmal           Other pertinent information:1. The past medical records were reviewed.     2.    Lab Results   Component Value Date    WBC 9.8 07/13/2020    HGB 9.3 (L) 07/13/2020    HCT 28.8 (L) 07/13/2020    MCV 82.3 07/13/2020     07/13/2020      Lab Results   Component Value Date     07/13/2020    K 4.0 07/13/2020    CL 98 07/13/2020    CO2 28 07/13/2020    BUN 13 07/13/2020    CREATININE 1.0 07/13/2020    GLUCOSE 245 (H) 07/13/2020    CALCIUM 8.9 07/13/2020    PROT 7.0 07/13/2020    LABALBU 3.0 (L) 07/13/2020    BILITOT 0.3 07/13/2020    ALKPHOS 102 07/13/2020    AST 17 07/13/2020    ALT 10 07/13/2020    LABGLOM >60 07/13/2020    GFRAA >60 07/13/2020     No results found for: APTT   Lab Results   Component Value Date    INR 1.2 05/01/2017    PROTIME 13.2 (H) 05/01/2017        3. VL LOWER EXTREMITY ARTERIAL SEGMENTAL PRESSURES W PPG BILATERAL   Final Result      MRI FOOT RIGHT WO CONTRAST   Final Result      Cellulitis of the mid and distal foot with 12 x 10 x 5 mm abscess   between the second and third metatarsal heads on the dorsal surface of   foot. No evidence to confirm osteomyelitis. XR FOOT RIGHT (MIN 3 VIEWS)   Final Result      1. No radiographic evidence of osteomyelitis. 2. No fracture or dislocation. XR CHEST PORTABLE   Final Result   No acute cardiopulmonary abnormality. CT ABDOMEN PELVIS W IV CONTRAST Additional Contrast? None   Final Result         1. Prominently distended urinary bladder dome at the level of the   umbilicus. Please correlate clinically as to the need for   catheterization. 2. Prominence of the right inguinal lymph nodes. A reactive etiology   is favored. Clinical correlation is needed. 3. Mild hepatomegaly. 4.  The history physical, ID and podiatry consultation notes were reviewed    5. The lower extremity artery Doppler study that was done was personally reviewed by me    Ankle arm index within normal range bilaterally with triphasic ankle    Doppler tracings with the good arterial flow to the right foot based    upon the pulse volume recordings over the metatarsal and on the left    side, moderately diminished pulse volume recordings noted over the    left metatarsal compared to the right side.         On the right side, flat pulse volume recording noted over the right    third toe and on the left side, markedly diminished pulse volume    recordings noted over the left fourth toe      6. The transcutaneous oxygen measurements were reviewed, improvement noted on oxygen challenge to the areas that were underperfused based upon room air saturation    Assessment:    1.   Diabetic foot ulcer, with abscess formation, post incision drainage of abscess, osteomyelitis of the second

## 2020-07-16 ENCOUNTER — HOSPITAL ENCOUNTER (EMERGENCY)
Age: 35
Discharge: HOME | End: 2020-07-16
Attending: EMERGENCY MEDICINE
Payer: COMMERCIAL

## 2020-07-16 VITALS
WEIGHT: 249 LBS | HEART RATE: 102 BPM | BODY MASS INDEX: 45.82 KG/M2 | DIASTOLIC BLOOD PRESSURE: 60 MMHG | OXYGEN SATURATION: 97 % | RESPIRATION RATE: 18 BRPM | HEIGHT: 62 IN | TEMPERATURE: 98.1 F | SYSTOLIC BLOOD PRESSURE: 105 MMHG

## 2020-07-16 LAB
Lab: NORMAL
REPORT: NORMAL
THIS TEST SENT TO: NORMAL

## 2020-07-16 PROCEDURE — 99282 EMERGENCY DEPT VISIT SF MDM: CPT

## 2020-07-16 PROCEDURE — 6370000000 HC RX 637 (ALT 250 FOR IP): Performed by: EMERGENCY MEDICINE

## 2020-07-16 RX ORDER — IBUPROFEN 800 MG/1
800 TABLET ORAL ONCE
Status: COMPLETED | OUTPATIENT
Start: 2020-07-16 | End: 2020-07-16

## 2020-07-16 RX ADMIN — IBUPROFEN 800 MG: 800 TABLET, FILM COATED ORAL at 04:28

## 2020-07-16 ASSESSMENT — PAIN SCALES - GENERAL
PAINLEVEL_OUTOF10: 7
PAINLEVEL_OUTOF10: 5

## 2020-07-16 ASSESSMENT — PAIN DESCRIPTION - ORIENTATION: ORIENTATION: RIGHT

## 2020-07-16 ASSESSMENT — PAIN DESCRIPTION - LOCATION: LOCATION: FOOT;TOE (COMMENT WHICH ONE)

## 2020-07-16 ASSESSMENT — PAIN DESCRIPTION - PROGRESSION: CLINICAL_PROGRESSION: GRADUALLY WORSENING

## 2020-07-16 ASSESSMENT — PAIN DESCRIPTION - ONSET: ONSET: SUDDEN

## 2020-07-16 ASSESSMENT — PAIN DESCRIPTION - FREQUENCY: FREQUENCY: CONTINUOUS

## 2020-07-16 ASSESSMENT — PAIN DESCRIPTION - PAIN TYPE: TYPE: ACUTE PAIN

## 2020-07-16 NOTE — ED NOTES
Bed: 19  Expected date:   Expected time:   Means of arrival:   Comments:  triage     Isacc Romero RN  07/16/20 [id number]

## 2020-07-16 NOTE — ADT AUTH CERT
Patient Demographics     Name  Patient ID  SSN  Gender Identity  Birth Date    Radhames Stoddard  [id number]    Female  [birth year] (34 yrs)    Address  Phone  Email  Employer     [address] [place] [address] [phone] (H)  [email or web address]  NOT EMPLOYED   [place]   [address]  [place]  Occupation  Emp Status     Kacey Luis  --  Not Employed     Reg Status  PCP  Date Last Verified  Next Review Date     Verified  -- 20     Admission Date  Discharge Date  Admitting Provider      20  Jazmyn Rossi MD      Marital Status  Yarsani         [facility]       Emergency Contact 1    [place] (2)   [address]   [phone] (H)    Nathanael Lobo Account [de-identified]   [facility]  Subscriber Name/Sex/Relation  Subscriber   Subscriber Address/Phone  Subscriber Emp/Emp Phone    1.  Debora Kramer   [id number]  Deangelo Grayson - Female   (Self)  1985  [address]  [address] [phone](G)  NOT EMPLOYED   [phone]    Utilization Reviews         Diabetes - Care Day 8 (2020) by Paras Espinal RN         Review Status  Review Entered    Completed  7/15/2020 07:55        Criteria Review       Care Day: 8 Care Date: 2020 Level of Care: Intermediate Care    Guideline Day 2    Level Of Care    (X) Floor    Clinical Status    (X) * Hypotension absent    (X) * Mental status at baseline    (X) * Acidosis absent or improved    (X) * Blood glucose under acceptable control or improved    (X) * Dehydration absent    (X) * Electrolyte abnormalities absent or improved    (X) * Renal function at baseline or improved    Activity    (X) * Ambulatory    Routes    (X) * Oral hydration, and medications    Interventions    (X) Serum glucose, serum ketones, chemistries, ABGs or venous pH measurements, urinalysis    (X) Bedside glucose monitoring    Medications (X) * IV insulin absent    (X) * Outpatient diabetic medication regimen initiated    (X) Subcutaneous insulin    (X) Potassium, phosphorus, and magnesium replacement as indicated    * Milestone    Additional Notes    7/12         Status post right third toe amputation on 7/10/2020 and I&D at the same time.  Currently is getting Invanz.  Due to home health care issues patient discharge is being held till Monday       /82   Pulse 89   Temp 97.1 °F (36.2 °C) (Temporal)   Resp 18   Ht 5' 2\" (1.575 m)   Wt 268 lb 7 oz (121.8 kg)   SpO2 95%       MEDS-    · insulin glargine 25 Units Subcutaneous Nightly    · insulin lispro 0-12 Units Subcutaneous TID WC    · insulin lispro 0-6 Units Subcutaneous Nightly    · ertapenem (INVanz) IVPB 1 g Intravenous Q24H    · insulin lispro 5 Units Subcutaneous TID AC    · metoclopramide 5 mg Oral 4x Daily AC & HS    · aspirin 81 mg Oral Daily    · cilostazol 100 mg Oral BID    · gabapentin 600 mg Oral BID WC    · pantoprazole 40 mg Oral QAM AC    · lidocaine 5 mL Intradermal Once    · heparin flush 1 mL Intravenous 2 times per day    · famotidine 20 mg Oral BID    · enoxaparin 40 mg Subcutaneous Daily    · sodium chloride flush 10 mL Intravenous 2 times per day       ID note-    Assessment:    · Soft tissue abscess/ deep space infection right foot between second set and third toe status post right foot incision and drainage of multiple compartments and right second toe bone biopsy on 7/7/2020 and on 7/10/2020- delayed primary closure of the right foot    · Leukocytosis--resolved    · Cyanotic-- right foot third toe --status post amputation of right foot third toe on 7/10/2020    Plan:      · Cont IV Invanz (will need at least 3 weeks of IV antibiotics)--- medication reconciled    · Check cultures from 7/7/2020 group B strep    · Follow surgical pathology from 7/7/2020    · Baseline CRP-7.7, sedimentation rate- 96    · Midline placed 7/7/2020    · Monitor labs    · Podiatry following    · Vascular following    · Arrange for home IV antibiotics       **Podiatry note-    ASSESSMENT:    -S/P repeat incision and drainage of right foot with amputation of 3rd toe (7/10/20, Dr. Lindsay Sen)    -Osteomyelitis    -Diabetic foot infection    -Elevated ESR    -Diabetes Mellitus              PLAN:    - Patient was examined and evaluated. - Reviewed patient's recent lab results and pertinent diagnostic imaging.    - Reviewed ancillary service notes. - Pain Control: IV and PO    - ABX per ID    - Vascular following    - Patient to be NWB to right foot    - Surgical cultures show growth of strep agalactiae (Beta Strep Group B)    - Pathology from 7/7/20 is negative for acute OM. Pathology from 7/10/20 pending.    - Dressing: betadine paint, xeroform, DSD, ACE.  Daily.    - Patient is stable for discharge from podiatric standpoint once medically stable    - Patient to follow-up with Dr. Lindsay Sen in clinic 1 week after discharge.        Diabetes - Care Day 7 (7/11/2020) by Lyod Claire RN         Review Status  Review Entered    Completed  7/15/2020 07:50        Criteria Review       Care Day: 7 Care Date: 7/11/2020 Level of Care: Intermediate Care    Guideline Day 2    Level Of Care    (X) Floor    Clinical Status    (X) * Hypotension absent    (X) * Mental status at baseline    (X) * Acidosis absent or improved    (X) * Blood glucose under acceptable control or improved    (X) * Dehydration absent    (X) * Electrolyte abnormalities absent or improved    (X) * Renal function at baseline or improved    Activity    (X) * Ambulatory    Routes    (X) * Oral hydration, and medications    Interventions    (X) Serum glucose, serum ketones, chemistries, ABGs or venous pH measurements, urinalysis    (X) Bedside glucose monitoring    Medications    (X) * IV insulin absent    ( ) * Outpatient diabetic medication regimen initiated    (X) Subcutaneous insulin    (X) Potassium, phosphorus, and magnesium replacement as indicated    * Milestone    Additional Notes    7/11         /76   Pulse 88   Temp 98 °F (36.7 °C) (Temporal)   Resp 18   Ht 5' 2\" (1.575 m)   Wt 268 lb 6.4 oz (121.7 kg)   SpO2 97% RA       MEDS-    insulin glargine 30 Units Subcutaneous Nightly    · insulin lispro 0-12 Units Subcutaneous TID WC    · insulin lispro 0-6 Units Subcutaneous Nightly    · ertapenem (INVanz) IVPB 1 g Intravenous Q24H    · insulin lispro 5 Units Subcutaneous TID AC    · metoclopramide 5 mg Oral 4x Daily AC & HS    · aspirin 81 mg Oral Daily    · cilostazol 100 mg Oral BID    · gabapentin 600 mg Oral BID WC    · pantoprazole 40 mg Oral QAM AC    · lidocaine 5 mL Intradermal Once    · heparin flush 1 mL Intravenous 2 times per day    · famotidine 20 mg Oral BID    · enoxaparin 40 mg Subcutaneous Daily    · sodium chloride flush 10 mL Intravenous 2 times per day    Kcl 40meq po x1, norco 1 tab, zofran 4mg ivp x1       ID note-    Assessment:    · Soft tissue abscess/ deep space infection right foot between second set and third toe status post right foot incision and drainage of multiple compartments and right second toe bone biopsy on 7/7/2020 and on 7/10/2020- delayed primary closure of the right foot    · Leukocytosis--resolved    · Cyanotic-- right foot third toe --status post amputation of right foot third toe on 7/10/2020    Plan:      · Cont IV Invanz (will need at least 3 weeks of IV antibiotics)--- medication reconciled    · Check cultures from 7/7/2020 group B strep    · Follow surgical pathology from 7/7/2020    · Baseline CRP-7.7, sedimentation rate- 96    · Midline placed 7/7/2020    · Monitor labs    · Podiatry following    · Vascular following    · Arrange for home IV antibiotics    · Will follow with you    · Patient to be discharged possible home Monday- since home IV infusion company can start patient giving IV antibiotics at home from Tuesday.        Internal MD note-    Status post right third toe amputation on 7/10/2020 and I&D at the same time.  Currently is getting Invanz.  Due to home health care issues patient discharge is being held till Monday. 1. Abdominal Pain- 2/2 ?inflammatory bowel disease vs Gastroparesis vs Chronic Adrenal Insuffiency vs Autoimmune bowel disease (Crohn's/Celiac) vs Doxycycline. Epigastric, LUQ/LLQ, pain after large meals. Elevated ESR/CRP. CAT scan (5-29-20)- small bowel thickening w/i jejunal loops which are mildly dilated; Ileal loops dilated w/o wall thickening; findings most likely reflective of enteritis/Crohn's. Repeat CAT scan (7-5-20): Prominently distended urinary bladder dome; Reactive inguinal LN; mild hepatomegaly.                -Tylenol prn for pain                -Phenergan or zofran prn for n/v                -Reglan for gastroparesis                -Diet: small frequent meals                  -Plan is to keep Reglan upon discharge    2. Right Diabetic Foot Ulcer and abscess- IDDM, h/o polyneuropathy and BL foot drop. Recently d/c 6-29-20 from ED on Doxycycline. Foot XR unremarkable (no fracture or osteomyelitis). Leukocytosis, CRP elevated at 7.7 (5.6 on 6-27-20). MRI of the foot does show an abscess between 2nd & 3rd metatarsal on the Rt foot; clinical evidence of osteomyelitis. Vascular studies showed good arterial blood flow to the Rt foot.                - s/p repeat I&D on 7/10/2020                -Plan is for 3 weeks of IV antibiotics with Invanz with home health care    3.  Gangrenous 3 rd toe s/p right third toe amputation on 7/10/2020                 -Likely due to aggressive debridement of the abscess may have               caused injury to the small arteries of the toe                 -Right foot is wrapped on dressing                 -Continue to elevate foot          4.  IDDM type II                -Patient's blood sugars have been better controlled after discontinuation of hydrocortisone                -Back off on Lantus to 25 units nightly as patient's fasting blood sugar is<140 mg/dl    4. Chronic Adrenal Insufficiency- Hypotension and dizziness. Receives hydrocortisone 15mg at home. /55, .                -Cosyntropin stim test have been suboptimal                 -In the background of being asymptomatic along with satisfactory blood pressure and challenging management of diabetes, patient's hydrocortisone has been decided to be discontinued, however if patient has intercurrent illness she may need stress dose    5. Leukocytosis- resolved         Podiatry note-    ASSESSMENT:    -S/P repeat incision and drainage of right foot with amputation of 3rd toe (7/10/20, Dr. Ramon Cordero)    -Osteomyelitis    -Diabetic foot infection    -Elevated ESR    -Diabetes Mellitus              PLAN:    - Patient was examined and evaluated. - Reviewed patient's recent lab results and pertinent diagnostic imaging.    - Reviewed ancillary service notes. - Pain Control: IV and PO    - ABX per ID    - Vascular following    - Patient to be NWB to right foot    - Final surgical cultures and pathology pending    - Dressing: betadine paint, DSD, ACE.  Daily.    - Discussed patient with Dr. Ramon Cordero    - Will continue to follow patient while they are in-house.        Diabetes - Care Day 6 (7/10/2020) by Colleen Rey RN         Review Status  Review Entered    Completed  7/15/2020 07:44        Criteria Review       Care Day: 6 Care Date: 7/10/2020 Level of Care: Intermediate Care    Guideline Day 2    Level Of Care    (X) Floor    Clinical Status    (X) * Hypotension absent    (X) * Mental status at baseline    (X) * Acidosis absent or improved    (X) * Blood glucose under acceptable control or improved    (X) * Dehydration absent    (X) * Electrolyte abnormalities absent or improved    (X) * Renal function at baseline or improved    Activity    ( ) * Ambulatory    Routes    (X) * Oral hydration, and medications    Interventions    (X) Serum glucose, serum ketones, chemistries, ABGs or venous pH measurements, urinalysis    (X) Bedside glucose monitoring    Medications    (X) * IV insulin absent    ( ) * Outpatient diabetic medication regimen initiated    (X) Subcutaneous insulin    (X) Potassium, phosphorus, and magnesium replacement as indicated    * Milestone    Additional Notes    7/10    to OR today       /66   Pulse 84   Temp 97.1 °F (36.2 °C) (Temporal)   Resp 16   Ht 5' 2\" (1.575 m)   Wt 268 lb 6.4 oz (121.7 kg)   SpO2 96% RA       MEDS-    insulin lispro 0-12 Units Subcutaneous TID WC    · insulin lispro 0-6 Units Subcutaneous Nightly    · sodium chloride 1,000 mL Intravenous Once    · insulin lispro 5 Units Subcutaneous TID AC    · insulin glargine 35 Units Subcutaneous Nightly    · IVPB builder Intravenous Q8H      And    · sodium chloride Intravenous Q8H    · metoclopramide 5 mg Oral 4x Daily AC & HS    · aspirin 81 mg Oral Daily    · cilostazol 100 mg Oral BID    · gabapentin 600 mg Oral BID WC    · pantoprazole 40 mg Oral QAM AC    · lidocaine 5 mL Intradermal Once    · heparin flush 1 mL Intravenous 2 times per day    · famotidine 20 mg Oral BID    · enoxaparin 40 mg Subcutaneous Daily    · sodium chloride flush 10 mL Intravenous 2 times per day    · sodium chloride 500 mL Intravenous Once       · sodium chloride 100 mL/hr    Fentanyl 25mcg iv x1, norco 1 tab x2, zofran 4mg iv x1       Internal MD note-    A/P    1. Abdominal pain                -possibly due to inflammatory bowel disease vs. Gastroparesis vs. Adrenal insufficiency vs. Autoimmune bowel disease                -mainly gets pain in LLQ/LUQ after meals                -ESR CRP was elevated                -CT scan on 5/29/20 showed small bowel thickening w/ jejunal loops that were mildly and ileal loops that were dilated reflective of crohn's/enteritis.  Repeat CT on 7/5/20 showed a distended urinary bladder dome, reactive inguinal lymph node and mild hepatomegaly                - Tylenol prn for pain                -Phenergan or zofran prn for n/v                -Reglan for gastroparesis                -Diet: NPO for surgery                -Further OP work-up for inflammatory bowel         2. Right Diabetic Foot Ulcer and abscess                -ID consult placed, follow recommendations                -received toradol 30mg for pain                -Continue Zosyn will need for at least 3 weeks, received 1 dose of vancomycin         3. Ischemic 3rd toe 2/2 Diabetes                -planned toe debridement and amputation of 3rd toe                -Vascular surgery consulted and do not recommend surgical intervention as she has good pulses                -Possible hyperbaric oxygen therapy                    4. Diabetes Mellitus II                -uncontrolled blood sugar                -patient was on hydrocotrisone before and was getting dextrose containing fluids with antibiotics which have been switched to .9% normal saline                -continue high dose sliding scale insulin, lantus 3/day before meals                -follow POCT glucos Q4                - blood sugar high this morning at 247 after patient was NPO last night for surgery continue to monitor                -Continue IV fluids         5. Chronic adrenal insufficiency                - on hydrocortisone 15mg at home, stopped for now                - get orthostatics positive may need give a dose of hydrocortisone after surgery       **OP note-    Date of Procedure: 7/10/2020    Pre-Op Diagnosis: right foot open wound.  Right third toe gangrene    Post-Op Diagnosis: Same     Procedure(s):    RIGHT FOOT INCISION AND DRAINAGE WITH AMPUTATION 3RD TOE, DELAY PRIMARY CLOSURE         Surgeon(s):    Tirso Costa DPM       ID note-    Assessment:    · Soft tissue abscess/ deep space infection right foot second third toe status post right foot incision and drainage of multiple compartments and right second toe bone biopsy    · Leukocytosis--resolved    · Cyanotic-- right foot third toe  (seen by vascular)         Plan:      · Cont (will need at least 3 weeks of IV antibiotics)    · Check cultures from 7/7/2020 group B strep    · Follow surgical pathology from 7/7/2020    · Baseline CRP-7.7, sedimentation rate- 96    · Midline placed 7/7/2020    · Monitor labs--CBC with differential, CMP ordered for tomorrow morning    · Podiatry following--- possible plans for toe amputation tommorrow    · Vascular following    · Arrange for home IV antibiotics           Diabetes - Care Day 5 (7/9/2020) by April Eubanks RN         Review Status  Review Entered    Completed  7/10/2020 15:52        Criteria Review       Care Day: 5 Care Date: 7/9/2020 Level of Care: Intermediate Care    Guideline Day 2    Level Of Care    (X) Floor    Clinical Status    (X) * Hypotension absent    (X) * Mental status at baseline    (X) * Acidosis absent or improved    (X) * Blood glucose under acceptable control or improved    ( ) * Dehydration absent    (X) * Electrolyte abnormalities absent or improved    (X) * Renal function at baseline or improved    Activity    ( ) * Ambulatory    Routes    (X) * Oral hydration, and medications    Interventions    (X) Serum glucose, serum ketones, chemistries, ABGs or venous pH measurements, urinalysis    (X) Bedside glucose monitoring    Medications    (X) * IV insulin absent    ( ) * Outpatient diabetic medication regimen initiated    (X) Subcutaneous insulin    (X) Potassium, phosphorus, and magnesium replacement as indicated    * Milestone    Additional Notes    7/9         BP (!) 106/58   Pulse 92   Temp 97.2 °F (36.2 °C) (Temporal)   Resp 16   Ht 5' 2\" (1.575 m)   Wt 268 lb 6.4 oz (121.7 kg)   SpO2 94%       MEDS-    · insulin lispro 5 Units Subcutaneous TID AC    · insulin glargine 35 Units Subcutaneous Nightly    · insulin lispro 0-18 Units Subcutaneous TID WC    · metoclopramide 5 mg Oral 4x Daily AC & HS    · understanding and signed consent.    - Dressing: betadine paint, DSD, ACE.  Daily.    - Plan for toe amputation tomorrow    - Placed NPO after midnight

## 2020-07-16 NOTE — ED PROVIDER NOTES
HPI:  20,   Time: 3:54 AM EDT         Taylor Mortimer is a 37 y.o. female presenting to the ED for wound check, beginning right foot ago. The complaint has been persistent, mild in severity, and worsened by nothing. Patient reports recent surgery with amputation of the third toe. Patient reports that she was on the commode and tried to not herself out of the commode she states colitis a very tight fit. Patient reported she is now full weight to try to get up and noticed bleeding from the site. Patient reporting no fever no chills no chest pain no difficulty breathing or abdominal pain. ROS:   Pertinent positives and negatives are stated within HPI, all other systems reviewed and are negative.  --------------------------------------------- PAST HISTORY ---------------------------------------------  Past Medical History:  has a past medical history of Cellulitis of foot, right, Diabetes mellitus ([facility] [address] Failed skin graft, Ischemic toe, and Skin flap necrosis ([facility] [address] Past Surgical History:  has a past surgical history that includes Cholecystectomy;  section; Foot Debridement (Right, 2020); and Foot Debridement (Right, 7/10/2020). Social History:  reports that she is a non-smoker but has been exposed to tobacco smoke. She has never used smokeless tobacco. She reports that she does not drink alcohol or use drugs. Family History: family history includes Heart Attack in her father. The patients home medications have been reviewed. Allergies: Rocephin [ceftriaxone]    -------------------------------------------------- RESULTS -------------------------------------------------  All laboratory and radiology results have been personally reviewed by myself   LABS:  No results found for this visit on 20.     RADIOLOGY:  Interpreted by Radiologist.  No orders to display       ------------------------- NURSING NOTES AND VITALS REVIEWED ---------------------------   The nursing notes within the ED encounter and vital signs as below have been reviewed. Pulse 98   Temp 98.1 °F (36.7 °C)   Resp 18   Ht 5' 2\" (1.575 m)   Wt 249 lb (112.9 kg)   SpO2 97%   BMI 45.54 kg/m²   Oxygen Saturation Interpretation: Normal      ---------------------------------------------------PHYSICAL EXAM--------------------------------------      Constitutional/General: Alert and oriented x3, well appearing, non toxic in NAD  Head: NC/AT  Eyes: PERRL, EOMI  Mouth: Oropharynx clear, handling secretions, no trismus  Neck: Supple, full ROM, no meningeal signs  Pulmonary: Lungs clear to auscultation bilaterally, no wheezes, rales, or rhonchi. Not in respiratory distress  Cardiovascular:  Regular rate and rhythm, no murmurs, gallops, or rubs. 2+ distal pulses  Abdomen: Soft, non tender, non distended,   Extremities: Moves all extremities x 4. Warm and well perfused  Skin: warm and dry without rash, noted wound to the right foot there is no active bleeding there is noted sutures in place on the dorsum of the right foot as well as plantar surface. There is no active bleeding pulses are intact there is no signs of outward infection. Neurologic: GCS 15,  Psych: Normal Affect      ------------------------------ ED COURSE/MEDICAL DECISION MAKING----------------------  Medications   ibuprofen (ADVIL;MOTRIN) tablet 800 mg (has no administration in time range)         Medical Decision Making:    Patient had wound redressed here and patient made aware of plan and need for follow-up and need to return if symptoms were to persist patient comfortable with plan. She was given Motrin for mild foot pain. Counseling: The emergency provider has spoken with the patient and discussed todays results, in addition to providing specific details for the plan of care and counseling regarding the diagnosis and prognosis.   Questions are answered at this time and they are agreeable with the plan.      --------------------------------- IMPRESSION AND DISPOSITION ---------------------------------    IMPRESSION  1.  Visit for wound check        DISPOSITION  Disposition: Discharge to home  Patient condition is stable                  Ilda Hills MD  07/16/20 [address],[place] [address], MD  07/16/20 0663

## 2020-07-17 ENCOUNTER — HOSPITAL ENCOUNTER (OUTPATIENT)
Age: 35
Discharge: HOME | End: 2020-07-19
Payer: COMMERCIAL

## 2020-07-17 DIAGNOSIS — G47.33 OBSTRUCTIVE SLEEP APNEA (ADULT) (PEDIATRIC): Primary | ICD-10-CM

## 2020-07-17 LAB
ALBUMIN SERPL-MCNC: 3 G/DL (ref 3.5–5.2)
ALP BLD-CCNC: 101 U/L (ref 35–104)
ALT SERPL-CCNC: 16 U/L (ref 0–32)
ANION GAP SERPL CALCULATED.3IONS-SCNC: 12 MMOL/L (ref 7–16)
AST SERPL-CCNC: 32 U/L (ref 0–31)
BASOPHILS ABSOLUTE: 0 E9/L (ref 0–0.2)
BASOPHILS RELATIVE PERCENT: 0.7 % (ref 0–2)
BILIRUB SERPL-MCNC: <0.2 MG/DL (ref 0–1.2)
BUN BLDV-MCNC: 8 MG/DL (ref 6–20)
C-REACTIVE PROTEIN: 2.4 MG/DL (ref 0–0.4)
CALCIUM SERPL-MCNC: 8.8 MG/DL (ref 8.6–10.2)
CHLORIDE BLD-SCNC: 98 MMOL/L (ref 98–107)
CO2: 28 MMOL/L (ref 22–29)
CREAT SERPL-MCNC: 1 MG/DL (ref 0.5–1)
EOSINOPHILS ABSOLUTE: 0.26 E9/L (ref 0.05–0.5)
EOSINOPHILS RELATIVE PERCENT: 3.5 % (ref 0–6)
GFR AFRICAN AMERICAN: >60
GFR NON-AFRICAN AMERICAN: >60 ML/MIN/1.73
GLUCOSE BLD-MCNC: 191 MG/DL (ref 74–99)
HCT VFR BLD CALC: 30 % (ref 34–48)
HEMOGLOBIN: 9.7 G/DL (ref 11.5–15.5)
LYMPHOCYTES ABSOLUTE: 2.34 E9/L (ref 1.5–4)
LYMPHOCYTES RELATIVE PERCENT: 31.6 % (ref 20–42)
MCH RBC QN AUTO: 26.6 PG (ref 26–35)
MCHC RBC AUTO-ENTMCNC: 32.3 % (ref 32–34.5)
MCV RBC AUTO: 82.4 FL (ref 80–99.9)
MONOCYTES ABSOLUTE: 0.22 E9/L (ref 0.1–0.95)
MONOCYTES RELATIVE PERCENT: 2.6 % (ref 2–12)
NEUTROPHILS ABSOLUTE: 4.53 E9/L (ref 1.8–7.3)
NEUTROPHILS RELATIVE PERCENT: 62.3 % (ref 43–80)
OVALOCYTES: ABNORMAL
PDW BLD-RTO: 13.8 FL (ref 11.5–15)
PLATELET # BLD: 382 E9/L (ref 130–450)
PMV BLD AUTO: 10.8 FL (ref 7–12)
POIKILOCYTES: ABNORMAL
POLYCHROMASIA: ABNORMAL
POTASSIUM SERPL-SCNC: 3.7 MMOL/L (ref 3.5–5)
RBC # BLD: 3.64 E12/L (ref 3.5–5.5)
SEDIMENTATION RATE, ERYTHROCYTE: 106 MM/HR (ref 0–20)
SODIUM BLD-SCNC: 138 MMOL/L (ref 132–146)
TOTAL PROTEIN: 6.8 G/DL (ref 6.4–8.3)
WBC # BLD: 7.3 E9/L (ref 4.5–11.5)

## 2020-07-17 PROCEDURE — 36415 COLL VENOUS BLD VENIPUNCTURE: CPT

## 2020-07-17 PROCEDURE — 85025 COMPLETE CBC W/AUTO DIFF WBC: CPT

## 2020-07-17 PROCEDURE — 86140 C-REACTIVE PROTEIN: CPT

## 2020-07-17 PROCEDURE — 80053 COMPREHEN METABOLIC PANEL: CPT

## 2020-07-17 PROCEDURE — 85651 RBC SED RATE NONAUTOMATED: CPT

## 2020-07-20 ENCOUNTER — HOSPITAL ENCOUNTER (OUTPATIENT)
Dept: HYPERBARIC MEDICINE | Age: 35
Setting detail: THERAPIES SERIES
Discharge: HOME | End: 2020-07-20
Payer: COMMERCIAL

## 2020-07-20 ENCOUNTER — APPOINTMENT (OUTPATIENT)
Dept: CT IMAGING | Age: 35
End: 2020-07-20
Payer: COMMERCIAL

## 2020-07-20 ENCOUNTER — HOSPITAL ENCOUNTER (EMERGENCY)
Age: 35
Discharge: HOME | End: 2020-07-20
Payer: COMMERCIAL

## 2020-07-20 VITALS
WEIGHT: 250 LBS | SYSTOLIC BLOOD PRESSURE: 106 MMHG | HEART RATE: 97 BPM | HEIGHT: 65 IN | TEMPERATURE: 97.9 F | DIASTOLIC BLOOD PRESSURE: 67 MMHG | OXYGEN SATURATION: 95 % | RESPIRATION RATE: 16 BRPM | BODY MASS INDEX: 41.65 KG/M2

## 2020-07-20 LAB
ALBUMIN SERPL-MCNC: 3.4 G/DL (ref 3.5–5.2)
ALP BLD-CCNC: 110 U/L (ref 35–104)
ALT SERPL-CCNC: 26 U/L (ref 0–32)
ANION GAP SERPL CALCULATED.3IONS-SCNC: 13 MMOL/L (ref 7–16)
AST SERPL-CCNC: 46 U/L (ref 0–31)
BACTERIA: ABNORMAL /HPF
BASOPHILS ABSOLUTE: 0.1 E9/L (ref 0–0.2)
BASOPHILS RELATIVE PERCENT: 1.5 % (ref 0–2)
BILIRUB SERPL-MCNC: 0.4 MG/DL (ref 0–1.2)
BILIRUBIN URINE: NEGATIVE
BLOOD, URINE: NEGATIVE
BUN BLDV-MCNC: 5 MG/DL (ref 6–20)
CALCIUM SERPL-MCNC: 9.1 MG/DL (ref 8.6–10.2)
CHLORIDE BLD-SCNC: 97 MMOL/L (ref 98–107)
CLARITY: CLEAR
CO2: 26 MMOL/L (ref 22–29)
COLOR: YELLOW
CREAT SERPL-MCNC: 0.9 MG/DL (ref 0.5–1)
EOSINOPHILS ABSOLUTE: 0.34 E9/L (ref 0.05–0.5)
EOSINOPHILS RELATIVE PERCENT: 5 % (ref 0–6)
EPITHELIAL CELLS, UA: ABNORMAL /HPF
GFR AFRICAN AMERICAN: >60
GFR NON-AFRICAN AMERICAN: >60 ML/MIN/1.73
GLUCOSE BLD-MCNC: 337 MG/DL (ref 74–99)
GLUCOSE URINE: >=1000 MG/DL
HCG, URINE, POC: NEGATIVE
HCT VFR BLD CALC: 31.9 % (ref 34–48)
HEMOGLOBIN: 10.3 G/DL (ref 11.5–15.5)
IMMATURE GRANULOCYTES #: 0.03 E9/L
IMMATURE GRANULOCYTES %: 0.4 % (ref 0–5)
KETONES, URINE: NEGATIVE MG/DL
LACTIC ACID: 1.2 MMOL/L (ref 0.5–2.2)
LEUKOCYTE ESTERASE, URINE: ABNORMAL
LIPASE: 15 U/L (ref 13–60)
LYMPHOCYTES ABSOLUTE: 2.53 E9/L (ref 1.5–4)
LYMPHOCYTES RELATIVE PERCENT: 37.1 % (ref 20–42)
Lab: NORMAL
MCH RBC QN AUTO: 26.1 PG (ref 26–35)
MCHC RBC AUTO-ENTMCNC: 32.3 % (ref 32–34.5)
MCV RBC AUTO: 80.8 FL (ref 80–99.9)
MONOCYTES ABSOLUTE: 0.42 E9/L (ref 0.1–0.95)
MONOCYTES RELATIVE PERCENT: 6.2 % (ref 2–12)
NEGATIVE QC PASS/FAIL: NORMAL
NEUTROPHILS ABSOLUTE: 3.4 E9/L (ref 1.8–7.3)
NEUTROPHILS RELATIVE PERCENT: 49.8 % (ref 43–80)
NITRITE, URINE: NEGATIVE
PDW BLD-RTO: 13.5 FL (ref 11.5–15)
PH UA: 6 (ref 5–9)
PLATELET # BLD: 414 E9/L (ref 130–450)
PMV BLD AUTO: 10.4 FL (ref 7–12)
POSITIVE QC PASS/FAIL: NORMAL
POTASSIUM REFLEX MAGNESIUM: 4 MMOL/L (ref 3.5–5)
PROTEIN UA: NEGATIVE MG/DL
RBC # BLD: 3.95 E12/L (ref 3.5–5.5)
RBC UA: ABNORMAL /HPF (ref 0–2)
SODIUM BLD-SCNC: 136 MMOL/L (ref 132–146)
SPECIFIC GRAVITY UA: 1.02 (ref 1–1.03)
TOTAL PROTEIN: 7.4 G/DL (ref 6.4–8.3)
UROBILINOGEN, URINE: 0.2 E.U./DL
WBC # BLD: 6.8 E9/L (ref 4.5–11.5)
WBC UA: ABNORMAL /HPF (ref 0–5)

## 2020-07-20 PROCEDURE — 6360000002 HC RX W HCPCS: Performed by: PHYSICIAN ASSISTANT

## 2020-07-20 PROCEDURE — 96375 TX/PRO/DX INJ NEW DRUG ADDON: CPT

## 2020-07-20 PROCEDURE — 99284 EMERGENCY DEPT VISIT MOD MDM: CPT

## 2020-07-20 PROCEDURE — 2580000003 HC RX 258: Performed by: RADIOLOGY

## 2020-07-20 PROCEDURE — 2580000003 HC RX 258: Performed by: PHYSICIAN ASSISTANT

## 2020-07-20 PROCEDURE — 96374 THER/PROPH/DIAG INJ IV PUSH: CPT

## 2020-07-20 PROCEDURE — 6360000004 HC RX CONTRAST MEDICATION: Performed by: RADIOLOGY

## 2020-07-20 PROCEDURE — 83605 ASSAY OF LACTIC ACID: CPT

## 2020-07-20 PROCEDURE — 81001 URINALYSIS AUTO W/SCOPE: CPT

## 2020-07-20 PROCEDURE — 74177 CT ABD & PELVIS W/CONTRAST: CPT

## 2020-07-20 PROCEDURE — 80053 COMPREHEN METABOLIC PANEL: CPT

## 2020-07-20 PROCEDURE — 83690 ASSAY OF LIPASE: CPT

## 2020-07-20 PROCEDURE — 85025 COMPLETE CBC W/AUTO DIFF WBC: CPT

## 2020-07-20 RX ORDER — DICYCLOMINE HYDROCHLORIDE 10 MG/1
20 CAPSULE ORAL
Qty: 15 CAPSULE | Refills: 0 | Status: SHIPPED | OUTPATIENT
Start: 2020-07-20 | End: 2021-01-14 | Stop reason: SDUPTHER

## 2020-07-20 RX ORDER — ONDANSETRON 2 MG/ML
4 INJECTION INTRAMUSCULAR; INTRAVENOUS ONCE
Status: COMPLETED | OUTPATIENT
Start: 2020-07-20 | End: 2020-07-20

## 2020-07-20 RX ORDER — 0.9 % SODIUM CHLORIDE 0.9 %
1000 INTRAVENOUS SOLUTION INTRAVENOUS ONCE
Status: COMPLETED | OUTPATIENT
Start: 2020-07-20 | End: 2020-07-20

## 2020-07-20 RX ORDER — SODIUM CHLORIDE 0.9 % (FLUSH) 0.9 %
10 SYRINGE (ML) INJECTION PRN
Status: DISCONTINUED | OUTPATIENT
Start: 2020-07-20 | End: 2020-07-20 | Stop reason: HOSPADM

## 2020-07-20 RX ORDER — MORPHINE SULFATE 4 MG/ML
8 INJECTION, SOLUTION INTRAMUSCULAR; INTRAVENOUS ONCE
Status: DISCONTINUED | OUTPATIENT
Start: 2020-07-20 | End: 2020-07-20

## 2020-07-20 RX ORDER — DIPHENHYDRAMINE HYDROCHLORIDE 50 MG/ML
25 INJECTION INTRAMUSCULAR; INTRAVENOUS ONCE
Status: DISCONTINUED | OUTPATIENT
Start: 2020-07-20 | End: 2020-07-20 | Stop reason: HOSPADM

## 2020-07-20 RX ORDER — ONDANSETRON 4 MG/1
4 TABLET, ORALLY DISINTEGRATING ORAL EVERY 8 HOURS PRN
Qty: 24 TABLET | Refills: 0 | Status: SHIPPED | OUTPATIENT
Start: 2020-07-20 | End: 2021-07-22 | Stop reason: SDUPTHER

## 2020-07-20 RX ORDER — MORPHINE SULFATE 4 MG/ML
4 INJECTION, SOLUTION INTRAMUSCULAR; INTRAVENOUS ONCE
Status: COMPLETED | OUTPATIENT
Start: 2020-07-20 | End: 2020-07-20

## 2020-07-20 RX ADMIN — ONDANSETRON HYDROCHLORIDE 4 MG: 2 SOLUTION INTRAMUSCULAR; INTRAVENOUS at 12:05

## 2020-07-20 RX ADMIN — IOPAMIDOL 110 ML: 755 INJECTION, SOLUTION INTRAVENOUS at 15:02

## 2020-07-20 RX ADMIN — SODIUM CHLORIDE 1000 ML: 9 INJECTION, SOLUTION INTRAVENOUS at 12:05

## 2020-07-20 RX ADMIN — MORPHINE SULFATE 4 MG: 4 INJECTION, SOLUTION INTRAMUSCULAR; INTRAVENOUS at 13:00

## 2020-07-20 RX ADMIN — Medication 10 ML: at 15:02

## 2020-07-20 ASSESSMENT — PAIN DESCRIPTION - ORIENTATION
ORIENTATION: RIGHT;LEFT
ORIENTATION: UPPER;LEFT

## 2020-07-20 ASSESSMENT — PAIN DESCRIPTION - FREQUENCY
FREQUENCY: CONTINUOUS
FREQUENCY: CONTINUOUS

## 2020-07-20 ASSESSMENT — PAIN SCALES - GENERAL
PAINLEVEL_OUTOF10: 3
PAINLEVEL_OUTOF10: 6

## 2020-07-20 ASSESSMENT — PAIN DESCRIPTION - ONSET
ONSET: ON-GOING
ONSET: ON-GOING

## 2020-07-20 ASSESSMENT — PAIN DESCRIPTION - LOCATION
LOCATION: ABDOMEN
LOCATION: ABDOMEN

## 2020-07-20 ASSESSMENT — PAIN DESCRIPTION - PROGRESSION: CLINICAL_PROGRESSION: NOT CHANGED

## 2020-07-20 ASSESSMENT — PAIN DESCRIPTION - DESCRIPTORS
DESCRIPTORS: ACHING;DISCOMFORT
DESCRIPTORS: CONSTANT;BURNING

## 2020-07-20 ASSESSMENT — PAIN DESCRIPTION - PAIN TYPE
TYPE: ACUTE PAIN
TYPE: ACUTE PAIN

## 2020-07-20 NOTE — ED NOTES
Bed: [place] [address].  Expected date:   Expected time:   Means of arrival:   Comments:  triage     Marilin Vegas RN  07/20/20 1599

## 2020-07-20 NOTE — ED PROVIDER NOTES
[facility]     Department of Emergency Medicine   ED  Provider Note  Admit Date/RoomTime: 2020 11:45 AM  ED Room: [facility]  Chief Complaint   Abdominal Pain (NVD for 2 days. denies other SX)    History of Present Illness   Source of history provided by:  patient. History/Exam Limitations: none. Preston Zimmerman is a 37 y.o. 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  There has been similar episodes in the past and she states she has a history of c diff. She confirms 1 episode of non-bloody emesis yesterday and 1 episode of \"semi-solid\" diarrhea today. She states: no travel, recent antibiotics, tainted food, contact with contagious person, history of GI disease, new medications or change in diet. The symptoms are associated with fatigue and anorexia. Symptoms are aggravated by eating and liquids and relieved by nothing. There has been no additional symptoms of f/c/s, cp, sob, hematemesis, hematochezia, or urinary complaints. ROS    Pertinent positives and negatives are stated within HPI, all other systems reviewed and are negative. Past Surgical History:   Procedure Laterality Date     SECTION      CHOLECYSTECTOMY      FOOT DEBRIDEMENT Right 2020    RIGHT 2ND TOE  INCISION AND DRAINAGE performed by Prachi Forrester DPM at [address] [place] [address] Right 7/10/2020    RIGHT FOOT INCISION AND DRAINAGE WITH AMPUTATION 3RD TOE, DELAY PRIMARY CLOSURE performed by Prachi Forrester DPM at [facility] [place]     Social History:  reports that she is a non-smoker but has been exposed to tobacco smoke.  She has never used smokeless tobacco. She reports that she does not drink alcohol or use drugs. Family History: family history includes Heart Attack in her father. Allergies: Rocephin [ceftriaxone]    Physical Exam          ED Triage Vitals   BP Temp Temp Source Pulse Resp SpO2 Height Weight   07/20/20 1032 07/20/20 1011 07/20/20 1011 07/20/20 1011 07/20/20 1032 07/20/20 1011 07/20/20 1150 07/20/20 1150   102/74 97.5 °F (36.4 °C) Tympanic 97 12 95 % 5' 5\" (1.651 m) (!) 325 lb (147.4 kg)      Oxygen Saturation Interpretation: Normal.    Constitutional:  Alert, development consistent with age. HEENT:  NC/NT. Airway patent. Neck:  Normal ROM. Supple. Respiratory:  Clear to auscultation and breath sounds equal.  CV:  Regular rate and rhythm, normal heart sounds, without pathological murmurs, ectopy, gallops, or rubs. GI:  General Appearance: normal.       Bowel sounds: normal bowel sounds. Distension:  None. Tenderness: moderate tenderness is present in the LUQ, no rebound tenderness, no guarding. Liver: non-tender. Spleen:  Tender LUQ, spleen unable to be palpated. Pulsatile Mass: absent. Rectal: (chaperone present during examination). not indicated / deferred. Back: CVA Tenderness: No.  Integument:  Normal turgor. Warm, dry, without visible rash, unless noted elsewhere. Lymphatics: No lymphangitis or adenopathy noted. Neurological:  Oriented. Motor functions intact.     Lab / Imaging Results   (All laboratory and radiology results have been personally reviewed by myself)  Labs:  Results for orders placed or performed during the hospital encounter of 07/20/20   CBC Auto Differential   Result Value Ref Range    WBC 6.8 4.5 - 11.5 E9/L    RBC 3.95 3.50 - 5.50 E12/L    Hemoglobin 10.3 (L) 11.5 - 15.5 g/dL    Hematocrit 31.9 (L) 34.0 - 48.0 %    MCV 80.8 80.0 - 99.9 fL    MCH 26.1 26.0 - 35.0 pg    MCHC 32.3 32.0 - 34.5 %    RDW 13.5 11.5 - 15.0 fL prescribed. She received the prescriptions below for symptomatic relief and has been encouraged to increase fluid intake. Specific conditions for emergent return have been discussed and the patient verbalized understanding to return immediately for new or worsening symptoms. Counseling: The emergency provider has spoken with the patient and discussed todays results, in addition to providing specific details for the plan of care and counseling regarding the diagnosis and prognosis. Questions are answered at this time and they are agreeable with the plan. Assessment     1. Non-intractable vomiting with nausea, unspecified vomiting type    2. Diarrhea, unspecified type       Plan   Discharge to home  Patient condition is good    New Medications     New Prescriptions    DICYCLOMINE (BENTYL) 10 MG CAPSULE    Take 2 capsules by mouth 4 times daily (before meals and nightly)    ONDANSETRON (ZOFRAN ODT) 4 MG DISINTEGRATING TABLET    Take 1 tablet by mouth every 8 hours as needed for Nausea or Vomiting     Electronically signed by Nisa Mandel PA-C   DD: 7/20/20  **This report was transcribed using voice recognition software. Every effort was made to ensure accuracy; however, inadvertent computerized transcription errors may be present.   END OF ED PROVIDER NOTE        Nisa Mandel PA-C  07/20/20 0325

## 2020-07-21 ENCOUNTER — OFFICE VISIT (OUTPATIENT)
Dept: FAMILY MEDICINE CLINIC | Age: 35
End: 2020-07-21
Payer: COMMERCIAL

## 2020-07-21 ENCOUNTER — HOSPITAL ENCOUNTER (OUTPATIENT)
Age: 35
Discharge: HOME | End: 2020-07-23
Payer: COMMERCIAL

## 2020-07-21 ENCOUNTER — HOSPITAL ENCOUNTER (OUTPATIENT)
Dept: HYPERBARIC MEDICINE | Age: 35
Setting detail: THERAPIES SERIES
Discharge: HOME | End: 2020-07-21
Payer: COMMERCIAL

## 2020-07-21 VITALS
HEART RATE: 105 BPM | TEMPERATURE: 97.7 F | SYSTOLIC BLOOD PRESSURE: 104 MMHG | DIASTOLIC BLOOD PRESSURE: 77 MMHG | RESPIRATION RATE: 16 BRPM | OXYGEN SATURATION: 98 %

## 2020-07-21 LAB
ALBUMIN SERPL-MCNC: 3.4 G/DL (ref 3.5–5.2)
ALP BLD-CCNC: 105 U/L (ref 35–104)
ALT SERPL-CCNC: 23 U/L (ref 0–32)
ANION GAP SERPL CALCULATED.3IONS-SCNC: 16 MMOL/L (ref 7–16)
AST SERPL-CCNC: 41 U/L (ref 0–31)
BASOPHILS ABSOLUTE: 0.11 E9/L (ref 0–0.2)
BASOPHILS RELATIVE PERCENT: 1.5 % (ref 0–2)
BILIRUB SERPL-MCNC: 0.2 MG/DL (ref 0–1.2)
BUN BLDV-MCNC: 6 MG/DL (ref 6–20)
C-REACTIVE PROTEIN: 1.7 MG/DL (ref 0–0.4)
CALCIUM SERPL-MCNC: 9.1 MG/DL (ref 8.6–10.2)
CHLORIDE BLD-SCNC: 98 MMOL/L (ref 98–107)
CO2: 25 MMOL/L (ref 22–29)
CREAT SERPL-MCNC: 1 MG/DL (ref 0.5–1)
EOSINOPHILS ABSOLUTE: 0.39 E9/L (ref 0.05–0.5)
EOSINOPHILS RELATIVE PERCENT: 5.3 % (ref 0–6)
GFR AFRICAN AMERICAN: >60
GFR NON-AFRICAN AMERICAN: >60 ML/MIN/1.73
GLUCOSE BLD-MCNC: 168 MG/DL (ref 74–99)
HCT VFR BLD CALC: 32.7 % (ref 34–48)
HEMOGLOBIN: 10.4 G/DL (ref 11.5–15.5)
IMMATURE GRANULOCYTES #: 0.02 E9/L
IMMATURE GRANULOCYTES %: 0.3 % (ref 0–5)
LYMPHOCYTES ABSOLUTE: 2.37 E9/L (ref 1.5–4)
LYMPHOCYTES RELATIVE PERCENT: 32.5 % (ref 20–42)
MCH RBC QN AUTO: 26.5 PG (ref 26–35)
MCHC RBC AUTO-ENTMCNC: 31.8 % (ref 32–34.5)
MCV RBC AUTO: 83.2 FL (ref 80–99.9)
MONOCYTES ABSOLUTE: 0.43 E9/L (ref 0.1–0.95)
MONOCYTES RELATIVE PERCENT: 5.9 % (ref 2–12)
NEUTROPHILS ABSOLUTE: 3.97 E9/L (ref 1.8–7.3)
NEUTROPHILS RELATIVE PERCENT: 54.5 % (ref 43–80)
PDW BLD-RTO: 13.5 FL (ref 11.5–15)
PLATELET # BLD: 418 E9/L (ref 130–450)
PMV BLD AUTO: 11 FL (ref 7–12)
POTASSIUM SERPL-SCNC: 3.6 MMOL/L (ref 3.5–5)
RBC # BLD: 3.93 E12/L (ref 3.5–5.5)
SEDIMENTATION RATE, ERYTHROCYTE: 76 MM/HR (ref 0–20)
SODIUM BLD-SCNC: 139 MMOL/L (ref 132–146)
TOTAL PROTEIN: 7.5 G/DL (ref 6.4–8.3)
WBC # BLD: 7.3 E9/L (ref 4.5–11.5)

## 2020-07-21 PROCEDURE — G8417 CALC BMI ABV UP PARAM F/U: HCPCS | Performed by: STUDENT IN AN ORGANIZED HEALTH CARE EDUCATION/TRAINING PROGRAM

## 2020-07-21 PROCEDURE — 80053 COMPREHEN METABOLIC PANEL: CPT

## 2020-07-21 PROCEDURE — 36415 COLL VENOUS BLD VENIPUNCTURE: CPT

## 2020-07-21 PROCEDURE — G8427 DOCREV CUR MEDS BY ELIG CLIN: HCPCS | Performed by: STUDENT IN AN ORGANIZED HEALTH CARE EDUCATION/TRAINING PROGRAM

## 2020-07-21 PROCEDURE — 85651 RBC SED RATE NONAUTOMATED: CPT

## 2020-07-21 PROCEDURE — 86140 C-REACTIVE PROTEIN: CPT

## 2020-07-21 PROCEDURE — 2022F DILAT RTA XM EVC RTNOPTHY: CPT | Performed by: STUDENT IN AN ORGANIZED HEALTH CARE EDUCATION/TRAINING PROGRAM

## 2020-07-21 PROCEDURE — 99213 OFFICE O/P EST LOW 20 MIN: CPT | Performed by: STUDENT IN AN ORGANIZED HEALTH CARE EDUCATION/TRAINING PROGRAM

## 2020-07-21 PROCEDURE — 3046F HEMOGLOBIN A1C LEVEL >9.0%: CPT | Performed by: STUDENT IN AN ORGANIZED HEALTH CARE EDUCATION/TRAINING PROGRAM

## 2020-07-21 PROCEDURE — 1111F DSCHRG MED/CURRENT MED MERGE: CPT | Performed by: STUDENT IN AN ORGANIZED HEALTH CARE EDUCATION/TRAINING PROGRAM

## 2020-07-21 PROCEDURE — 1036F TOBACCO NON-USER: CPT | Performed by: STUDENT IN AN ORGANIZED HEALTH CARE EDUCATION/TRAINING PROGRAM

## 2020-07-21 PROCEDURE — 85025 COMPLETE CBC W/AUTO DIFF WBC: CPT

## 2020-07-21 RX ORDER — FAMOTIDINE 20 MG/1
20 TABLET, FILM COATED ORAL 2 TIMES DAILY
Qty: 60 TABLET | Refills: 5 | Status: SHIPPED | OUTPATIENT
Start: 2020-07-21 | End: 2020-10-09 | Stop reason: SDUPTHER

## 2020-07-21 NOTE — PROGRESS NOTES
S: 37 y.o. female with   Chief Complaint   Patient presents with    New Patient    Follow[facility]Up from [facility]     7/21 Hyperglycemia    Diabetes     7/5/20 HGA1C 10.1    Heartburn     Omeprazole not helping       DM, still on PICC line for osteomyelitis and amputation toe. BP Readings from Last 3 Encounters:   07/21/20 104/77   07/20/20 106/67   07/16/20 105/60       O: VS:  temporal temperature is 97.7 °F (36.5 °C). Her blood pressure is 104/77 and her pulse is 105. Her respiration is 16 and oxygen saturation is 98%. AAO/NAD, appropriate affect for mood  CV:  RRR, no murmur  Resp: CTAB      Impression/Plan:   1) DM2 - increase insulin  2) GERD/Gastroparesis - may need better eval - consider trial famotidine  3) Foot Infection - cont with ID      Health Maintenance Due   Topic Date Due    Varicella vaccine (1 of 2 - 2-dose childhood series) 11/27/1984    Pneumococcal 0-64 years Vaccine (1 of 1 - PPSV23) 11/27/1989    Diabetic retinal exam  11/27/1993    Lipid screen  11/27/1993    HIV screen  11/27/1998    Diabetic microalbuminuria test  11/27/2001    Hepatitis B vaccine (1 of 3 - Risk 3-dose series) 11/27/2002    Cervical cancer screen  11/27/2004         Attending Physician Statement  I have discussed the case, including pertinent history and exam findings with the resident. I agree with the documented assessment and plan.       Vijay Carrera MD

## 2020-07-21 NOTE — PROGRESS NOTES
Negative. Negative for congestion and mouth sores. Eyes: Negative. Negative for redness. Respiratory: Negative. Cardiovascular: Positive for leg swelling. Negative for chest pain. Gastrointestinal: Positive for abdominal pain and nausea. Negative for abdominal distention, blood in stool, constipation and vomiting. Genitourinary: Negative. Negative for difficulty urinating, enuresis and hematuria. Musculoskeletal: Positive for joint swelling. Skin: Positive for wound. Allergic/Immunologic: Negative. Neurological: Negative. Negative for dizziness, tremors, syncope, light-headedness, numbness and headaches. Hematological: Negative. Negative for adenopathy. Psychiatric/Behavioral: Negative. Negative for confusion. The patient is not nervous/anxious.         Past Medical History:      Diagnosis Date    Cellulitis of foot, right 2020    Diabetes mellitus ([facility] [address]     Failed skin graft 2020    Ischemic toe 2020    Skin flap necrosis ([facility] [address] 2020       Past Surgical History:        Procedure Laterality Date     SECTION      CHOLECYSTECTOMY      FOOT DEBRIDEMENT Right 2020    RIGHT 2ND TOE  INCISION AND DRAINAGE performed by Eve Paula DPM at [address] [place] [address] Right 7/10/2020    RIGHT FOOT INCISION AND DRAINAGE WITH AMPUTATION 3RD TOE, DELAY PRIMARY CLOSURE [place] Tirso H[place] [facility] [place]       Allergies:    Rocephin [ceftriaxone]    Social History:   Social History     Socioeconomic History    Marital status:      Spouse name: Not on file    Number of children: Not on file    Years of education: Not on file    Highest education level: Not on file   Occupational History    Not on file   Social Needs    Financial resource strain: Not on file    Food insecurity     Worry: Not on file     Inability: Not on file    Transportation needs     Medical: Not on file     Non-medical: Not on file   Tobacco Use    Smoking status: Passive Smoke Exposure - Never Smoker    Smokeless tobacco: Never Used   Substance and Sexual Activity    Alcohol use: No    Drug use: No    Sexual activity: Not Currently   Lifestyle    Physical activity     Days per week: Not on file     Minutes per session: Not on file    Stress: Not on file   Relationships    Social connections     Talks on phone: Not on file     Gets together: Not on file     Attends Latter day service: Not on file     Active member of club or organization: Not on file     Attends meetings of clubs or organizations: Not on file     Relationship status: Not on file    Intimate partner violence     Fear of current or ex partner: Not on file     Emotionally abused: Not on file     Physically abused: Not on file     Forced sexual activity: Not on file   Other Topics Concern    Not on file   Social History Narrative    Not on file        Family History:       Problem Relation Age of Onset    Heart Attack Father        BP Readings from Last 3 Encounters:   07/21/20 104/77   07/20/20 106/67   07/16/20 105/60       Physical Exam:    Vitals: /77   Pulse 105   Temp 97.7 °F (36.5 °C) (Temporal)   Resp 16   SpO2 98%   Breastfeeding No   General Appearance: Well developed, awake, alert, oriented, no acute distress  HEENT: Normocephalic,atraumatic. PERRL, EOM's intact, EAC without erythemaor swelling, no pallor or icterus. Neck: Supple, symmetrical, trachea midline. No JVD. Chest wall/Lung: Clear to auscultation bilaterally,  respirations unlabored. No ronchi/wheezing/rales  Heart[de-identified]  Regular rate and rhythm, S1and S2 normal, no murmur, rub or gallop. Abdomen: Soft, non-tender, bowel sounds normoactive, no masses, no organomegaly  Extremities:  Extremities normal, atraumatic, no cyanosis. no edema. Rt foot was wrapped with dressing, no wound soakage.   Skin: Skin color, texture, turgor normal, no rashes or lesions  Musculokeletal: ROM grossly normal in all joints of extremities, no add 3 units of Humalog If blood sugars are 301-350 - add 4 units of Humalog If  above 350 - add 5 unit of humalog 1 vial 3    GABAPENTIN PO Take 600 mg by mouth 2 times daily (with meals)        No current facility-administered medications for this visit. Christopher Nguyen MD       This document may have been prepared at least partiallythrough the use of voice recognition software. Although effort is taken to assure the accuracy of this document, it is possible that grammatical, syntax,  or spelling errors may occur.

## 2020-07-23 ENCOUNTER — HOSPITAL ENCOUNTER (OUTPATIENT)
Dept: HYPERBARIC MEDICINE | Age: 35
Setting detail: THERAPIES SERIES
Discharge: HOME | End: 2020-07-23
Payer: COMMERCIAL

## 2020-07-24 ENCOUNTER — HOSPITAL ENCOUNTER (OUTPATIENT)
Age: 35
Discharge: HOME | End: 2020-07-26
Payer: COMMERCIAL

## 2020-07-24 LAB
ALBUMIN SERPL-MCNC: 3.5 G/DL (ref 3.5–5.2)
ALP BLD-CCNC: 108 U/L (ref 35–104)
ALT SERPL-CCNC: 29 U/L (ref 0–32)
ANION GAP SERPL CALCULATED.3IONS-SCNC: 14 MMOL/L (ref 7–16)
AST SERPL-CCNC: 58 U/L (ref 0–31)
BASOPHILS ABSOLUTE: 0.12 E9/L (ref 0–0.2)
BASOPHILS RELATIVE PERCENT: 1.7 % (ref 0–2)
BILIRUB SERPL-MCNC: 0.3 MG/DL (ref 0–1.2)
BUN BLDV-MCNC: 6 MG/DL (ref 6–20)
C-REACTIVE PROTEIN: 1.2 MG/DL (ref 0–0.4)
CALCIUM SERPL-MCNC: 9.6 MG/DL (ref 8.6–10.2)
CHLORIDE BLD-SCNC: 99 MMOL/L (ref 98–107)
CO2: 27 MMOL/L (ref 22–29)
CREAT SERPL-MCNC: 1 MG/DL (ref 0.5–1)
EOSINOPHILS ABSOLUTE: 0.41 E9/L (ref 0.05–0.5)
EOSINOPHILS RELATIVE PERCENT: 6 % (ref 0–6)
GFR AFRICAN AMERICAN: >60
GFR NON-AFRICAN AMERICAN: >60 ML/MIN/1.73
GLUCOSE BLD-MCNC: 94 MG/DL (ref 74–99)
HCT VFR BLD CALC: 36.9 % (ref 34–48)
HEMOGLOBIN: 11.6 G/DL (ref 11.5–15.5)
IMMATURE GRANULOCYTES #: 0.01 E9/L
IMMATURE GRANULOCYTES %: 0.1 % (ref 0–5)
LYMPHOCYTES ABSOLUTE: 2.64 E9/L (ref 1.5–4)
LYMPHOCYTES RELATIVE PERCENT: 38.4 % (ref 20–42)
MCH RBC QN AUTO: 26.2 PG (ref 26–35)
MCHC RBC AUTO-ENTMCNC: 31.4 % (ref 32–34.5)
MCV RBC AUTO: 83.5 FL (ref 80–99.9)
MONOCYTES ABSOLUTE: 0.39 E9/L (ref 0.1–0.95)
MONOCYTES RELATIVE PERCENT: 5.7 % (ref 2–12)
NEUTROPHILS ABSOLUTE: 3.3 E9/L (ref 1.8–7.3)
NEUTROPHILS RELATIVE PERCENT: 48.1 % (ref 43–80)
PDW BLD-RTO: 14.1 FL (ref 11.5–15)
PLATELET # BLD: 376 E9/L (ref 130–450)
PMV BLD AUTO: 11.4 FL (ref 7–12)
POTASSIUM SERPL-SCNC: 3.6 MMOL/L (ref 3.5–5)
RBC # BLD: 4.42 E12/L (ref 3.5–5.5)
SODIUM BLD-SCNC: 140 MMOL/L (ref 132–146)
TOTAL PROTEIN: 7.8 G/DL (ref 6.4–8.3)
WBC # BLD: 6.9 E9/L (ref 4.5–11.5)

## 2020-07-24 PROCEDURE — 36415 COLL VENOUS BLD VENIPUNCTURE: CPT

## 2020-07-24 PROCEDURE — 85025 COMPLETE CBC W/AUTO DIFF WBC: CPT

## 2020-07-24 PROCEDURE — 85651 RBC SED RATE NONAUTOMATED: CPT

## 2020-07-24 PROCEDURE — 86140 C-REACTIVE PROTEIN: CPT

## 2020-07-24 PROCEDURE — 80053 COMPREHEN METABOLIC PANEL: CPT

## 2020-07-24 ASSESSMENT — ENCOUNTER SYMPTOMS
BLOOD IN STOOL: 0
EYE REDNESS: 0
VOMITING: 0
NAUSEA: 1
EYES NEGATIVE: 1
ABDOMINAL PAIN: 1
ABDOMINAL DISTENTION: 0
RESPIRATORY NEGATIVE: 1
ALLERGIC/IMMUNOLOGIC NEGATIVE: 1
CONSTIPATION: 0

## 2020-07-25 LAB — SEDIMENTATION RATE, ERYTHROCYTE: 97 MM/HR (ref 0–20)

## 2020-07-27 ENCOUNTER — HOSPITAL ENCOUNTER (EMERGENCY)
Age: 35
Discharge: LEFT BEFORE DISCHARGE | End: 2020-07-28
Attending: EMERGENCY MEDICINE
Payer: COMMERCIAL

## 2020-07-27 ENCOUNTER — APPOINTMENT (OUTPATIENT)
Dept: ULTRASOUND IMAGING | Age: 35
End: 2020-07-27
Payer: COMMERCIAL

## 2020-07-27 VITALS
OXYGEN SATURATION: 98 % | HEART RATE: 110 BPM | WEIGHT: 250 LBS | RESPIRATION RATE: 18 BRPM | BODY MASS INDEX: 41.6 KG/M2 | TEMPERATURE: 97.9 F

## 2020-07-27 LAB
ANION GAP SERPL CALCULATED.3IONS-SCNC: 13 MMOL/L (ref 7–16)
BASOPHILS ABSOLUTE: 0.1 E9/L (ref 0–0.2)
BASOPHILS RELATIVE PERCENT: 1.5 % (ref 0–2)
BUN BLDV-MCNC: 5 MG/DL (ref 6–20)
CALCIUM SERPL-MCNC: 9.9 MG/DL (ref 8.6–10.2)
CHLORIDE BLD-SCNC: 100 MMOL/L (ref 98–107)
CO2: 26 MMOL/L (ref 22–29)
CREAT SERPL-MCNC: 1 MG/DL (ref 0.5–1)
EOSINOPHILS ABSOLUTE: 0.35 E9/L (ref 0.05–0.5)
EOSINOPHILS RELATIVE PERCENT: 5.1 % (ref 0–6)
GFR AFRICAN AMERICAN: >60
GFR NON-AFRICAN AMERICAN: >60 ML/MIN/1.73
GLUCOSE BLD-MCNC: 232 MG/DL (ref 74–99)
HCT VFR BLD CALC: 34.2 % (ref 34–48)
HEMOGLOBIN: 11.1 G/DL (ref 11.5–15.5)
IMMATURE GRANULOCYTES #: 0.02 E9/L
IMMATURE GRANULOCYTES %: 0.3 % (ref 0–5)
LACTIC ACID: 1.8 MMOL/L (ref 0.5–2.2)
LYMPHOCYTES ABSOLUTE: 2.53 E9/L (ref 1.5–4)
LYMPHOCYTES RELATIVE PERCENT: 37 % (ref 20–42)
MCH RBC QN AUTO: 26.2 PG (ref 26–35)
MCHC RBC AUTO-ENTMCNC: 32.5 % (ref 32–34.5)
MCV RBC AUTO: 80.9 FL (ref 80–99.9)
MONOCYTES ABSOLUTE: 0.44 E9/L (ref 0.1–0.95)
MONOCYTES RELATIVE PERCENT: 6.4 % (ref 2–12)
NEUTROPHILS ABSOLUTE: 3.4 E9/L (ref 1.8–7.3)
NEUTROPHILS RELATIVE PERCENT: 49.7 % (ref 43–80)
PDW BLD-RTO: 13.8 FL (ref 11.5–15)
PLATELET # BLD: 338 E9/L (ref 130–450)
PMV BLD AUTO: 11 FL (ref 7–12)
POTASSIUM SERPL-SCNC: 3.7 MMOL/L (ref 3.5–5)
RBC # BLD: 4.23 E12/L (ref 3.5–5.5)
SODIUM BLD-SCNC: 139 MMOL/L (ref 132–146)
WBC # BLD: 6.8 E9/L (ref 4.5–11.5)

## 2020-07-27 PROCEDURE — 87040 BLOOD CULTURE FOR BACTERIA: CPT

## 2020-07-27 PROCEDURE — 80048 BASIC METABOLIC PNL TOTAL CA: CPT

## 2020-07-27 PROCEDURE — 85025 COMPLETE CBC W/AUTO DIFF WBC: CPT

## 2020-07-27 PROCEDURE — 93971 EXTREMITY STUDY: CPT

## 2020-07-27 PROCEDURE — 83605 ASSAY OF LACTIC ACID: CPT

## 2020-07-27 PROCEDURE — 99283 EMERGENCY DEPT VISIT LOW MDM: CPT

## 2020-07-27 NOTE — ED NOTES
FIRST PROVIDER CONTACT ASSESSMENT NOTE      Department of Emergency Medicine   ED  First Provider Note   7/27/20  12:56 PM EDT    Chief Complaint: Vascular Access Problem (redness/swelling around PICC, sent by home nurse, receiving atbx for foot wound)      History of Present Illness:    Roberto Lerma is a 37 y.o. female who presents to the ED by private car for possible PICC line infection. States it started a few days ago, reports redness, itching. No pain. Focused Screening Exam:  Constitutional:  Alert, appears stated age and is in no distress. Mild surrounding right PICC line redness.  2+ radial pulse of the right wrist.     *ALLERGIES*     Rocephin [ceftriaxone]     ED Triage Vitals   BP Temp Temp Source Pulse Resp SpO2 Height Weight   -- 07/27/20 1231 07/27/20 1231 07/27/20 1231 07/27/20 1255 07/27/20 1231 -- 07/27/20 1255    97.1 °F (36.2 °C) Tympanic 112 18 95 %  250 lb (113.4 kg)        Initial Plan of Care:  Initiate Treatment-Testing, Proceed toTreatment Area When Bed Available for ED Attending/MLP to Continue Care    -----------------END OF FIRST PROVIDER CONTACT ASSESSMENT NOTE--------------  Electronically signed by SAULO Dubose   DD: 7/27/20       Nany Munguia, [id number]  07/27/20 1257

## 2020-07-27 NOTE — ED NOTES
Blood cultures obtained from right hand, per policy. Set 1/1 drawn at this time by [facility].               Lauryn Taylor RN  07/27/20 3849

## 2020-07-29 ENCOUNTER — HOSPITAL ENCOUNTER (OUTPATIENT)
Dept: HYPERBARIC MEDICINE | Age: 35
Setting detail: THERAPIES SERIES
Discharge: HOME | End: 2020-07-29
Payer: COMMERCIAL

## 2020-07-30 ENCOUNTER — HOSPITAL ENCOUNTER (OUTPATIENT)
Age: 35
Discharge: HOME | End: 2020-08-01
Payer: COMMERCIAL

## 2020-07-30 LAB
ALBUMIN SERPL-MCNC: 3.6 G/DL (ref 3.5–5.2)
ALP BLD-CCNC: 106 U/L (ref 35–104)
ALT SERPL-CCNC: 23 U/L (ref 0–32)
ANION GAP SERPL CALCULATED.3IONS-SCNC: 17 MMOL/L (ref 7–16)
AST SERPL-CCNC: 42 U/L (ref 0–31)
BASOPHILS ABSOLUTE: 0.08 E9/L (ref 0–0.2)
BASOPHILS RELATIVE PERCENT: 1.4 % (ref 0–2)
BILIRUB SERPL-MCNC: 0.4 MG/DL (ref 0–1.2)
BUN BLDV-MCNC: 5 MG/DL (ref 6–20)
C-REACTIVE PROTEIN: 1.5 MG/DL (ref 0–0.4)
CALCIUM SERPL-MCNC: 9 MG/DL (ref 8.6–10.2)
CHLORIDE BLD-SCNC: 97 MMOL/L (ref 98–107)
CO2: 22 MMOL/L (ref 22–29)
CREAT SERPL-MCNC: 0.9 MG/DL (ref 0.5–1)
EOSINOPHILS ABSOLUTE: 0.32 E9/L (ref 0.05–0.5)
EOSINOPHILS RELATIVE PERCENT: 5.6 % (ref 0–6)
GFR AFRICAN AMERICAN: >60
GFR NON-AFRICAN AMERICAN: >60 ML/MIN/1.73
GLUCOSE BLD-MCNC: 206 MG/DL (ref 74–99)
HCT VFR BLD CALC: 34.1 % (ref 34–48)
HEMOGLOBIN: 11.1 G/DL (ref 11.5–15.5)
IMMATURE GRANULOCYTES #: 0.01 E9/L
IMMATURE GRANULOCYTES %: 0.2 % (ref 0–5)
LYMPHOCYTES ABSOLUTE: 2.32 E9/L (ref 1.5–4)
LYMPHOCYTES RELATIVE PERCENT: 40.6 % (ref 20–42)
MCH RBC QN AUTO: 26.4 PG (ref 26–35)
MCHC RBC AUTO-ENTMCNC: 32.6 % (ref 32–34.5)
MCV RBC AUTO: 81 FL (ref 80–99.9)
MONOCYTES ABSOLUTE: 0.35 E9/L (ref 0.1–0.95)
MONOCYTES RELATIVE PERCENT: 6.1 % (ref 2–12)
NEUTROPHILS ABSOLUTE: 2.63 E9/L (ref 1.8–7.3)
NEUTROPHILS RELATIVE PERCENT: 46.1 % (ref 43–80)
PDW BLD-RTO: 14.1 FL (ref 11.5–15)
PLATELET # BLD: 306 E9/L (ref 130–450)
PMV BLD AUTO: 11.9 FL (ref 7–12)
POTASSIUM SERPL-SCNC: 3.4 MMOL/L (ref 3.5–5)
RBC # BLD: 4.21 E12/L (ref 3.5–5.5)
SEDIMENTATION RATE, ERYTHROCYTE: 85 MM/HR (ref 0–20)
SODIUM BLD-SCNC: 136 MMOL/L (ref 132–146)
TOTAL PROTEIN: 7.8 G/DL (ref 6.4–8.3)
WBC # BLD: 5.7 E9/L (ref 4.5–11.5)

## 2020-07-30 PROCEDURE — 36415 COLL VENOUS BLD VENIPUNCTURE: CPT

## 2020-07-30 PROCEDURE — 85651 RBC SED RATE NONAUTOMATED: CPT

## 2020-07-30 PROCEDURE — 85025 COMPLETE CBC W/AUTO DIFF WBC: CPT

## 2020-07-30 PROCEDURE — 80053 COMPREHEN METABOLIC PANEL: CPT

## 2020-07-30 PROCEDURE — 86140 C-REACTIVE PROTEIN: CPT

## 2020-08-01 LAB — BLOOD CULTURE, ROUTINE: NORMAL

## 2020-08-04 ENCOUNTER — HOSPITAL ENCOUNTER (OUTPATIENT)
Age: 35
Discharge: HOME | End: 2020-08-06
Payer: COMMERCIAL

## 2020-08-04 LAB
ALBUMIN SERPL-MCNC: 3 G/DL (ref 3.5–5.2)
ALP BLD-CCNC: 90 U/L (ref 35–104)
ALT SERPL-CCNC: 21 U/L (ref 0–32)
ANION GAP SERPL CALCULATED.3IONS-SCNC: 14 MMOL/L (ref 7–16)
AST SERPL-CCNC: 44 U/L (ref 0–31)
BASOPHILS ABSOLUTE: 0.09 E9/L (ref 0–0.2)
BASOPHILS RELATIVE PERCENT: 1.6 % (ref 0–2)
BILIRUB SERPL-MCNC: 0.3 MG/DL (ref 0–1.2)
BUN BLDV-MCNC: 6 MG/DL (ref 6–20)
C-REACTIVE PROTEIN: 1.3 MG/DL (ref 0–0.4)
CALCIUM SERPL-MCNC: 9 MG/DL (ref 8.6–10.2)
CHLORIDE BLD-SCNC: 97 MMOL/L (ref 98–107)
CO2: 25 MMOL/L (ref 22–29)
CREAT SERPL-MCNC: 1 MG/DL (ref 0.5–1)
EOSINOPHILS ABSOLUTE: 0.43 E9/L (ref 0.05–0.5)
EOSINOPHILS RELATIVE PERCENT: 7.6 % (ref 0–6)
GFR AFRICAN AMERICAN: >60
GFR NON-AFRICAN AMERICAN: >60 ML/MIN/1.73
GLUCOSE BLD-MCNC: 196 MG/DL (ref 74–99)
HCT VFR BLD CALC: 29.7 % (ref 34–48)
HEMOGLOBIN: 10 G/DL (ref 11.5–15.5)
IMMATURE GRANULOCYTES #: 0.01 E9/L
IMMATURE GRANULOCYTES %: 0.2 % (ref 0–5)
LYMPHOCYTES ABSOLUTE: 2.44 E9/L (ref 1.5–4)
LYMPHOCYTES RELATIVE PERCENT: 43 % (ref 20–42)
MCH RBC QN AUTO: 27 PG (ref 26–35)
MCHC RBC AUTO-ENTMCNC: 33.7 % (ref 32–34.5)
MCV RBC AUTO: 80.1 FL (ref 80–99.9)
MONOCYTES ABSOLUTE: 0.37 E9/L (ref 0.1–0.95)
MONOCYTES RELATIVE PERCENT: 6.5 % (ref 2–12)
NEUTROPHILS ABSOLUTE: 2.34 E9/L (ref 1.8–7.3)
NEUTROPHILS RELATIVE PERCENT: 41.1 % (ref 43–80)
PDW BLD-RTO: 13.8 FL (ref 11.5–15)
PLATELET # BLD: 262 E9/L (ref 130–450)
PMV BLD AUTO: 11.8 FL (ref 7–12)
POTASSIUM SERPL-SCNC: 3.4 MMOL/L (ref 3.5–5)
RBC # BLD: 3.71 E12/L (ref 3.5–5.5)
SEDIMENTATION RATE, ERYTHROCYTE: 84 MM/HR (ref 0–20)
SODIUM BLD-SCNC: 136 MMOL/L (ref 132–146)
TOTAL PROTEIN: 6.9 G/DL (ref 6.4–8.3)
WBC # BLD: 5.7 E9/L (ref 4.5–11.5)

## 2020-08-04 PROCEDURE — 85025 COMPLETE CBC W/AUTO DIFF WBC: CPT

## 2020-08-04 PROCEDURE — 85651 RBC SED RATE NONAUTOMATED: CPT

## 2020-08-04 PROCEDURE — 36415 COLL VENOUS BLD VENIPUNCTURE: CPT

## 2020-08-04 PROCEDURE — 80053 COMPREHEN METABOLIC PANEL: CPT

## 2020-08-04 PROCEDURE — 86140 C-REACTIVE PROTEIN: CPT

## 2020-08-06 ENCOUNTER — HOSPITAL ENCOUNTER (OUTPATIENT)
Age: 35
Discharge: HOME | End: 2020-08-08
Payer: COMMERCIAL

## 2020-08-06 LAB
ALBUMIN SERPL-MCNC: 3.5 G/DL (ref 3.5–5.2)
ALP BLD-CCNC: 91 U/L (ref 35–104)
ALT SERPL-CCNC: 27 U/L (ref 0–32)
ANION GAP SERPL CALCULATED.3IONS-SCNC: 12 MMOL/L (ref 7–16)
AST SERPL-CCNC: 57 U/L (ref 0–31)
BASOPHILS ABSOLUTE: 0.11 E9/L (ref 0–0.2)
BASOPHILS RELATIVE PERCENT: 2.1 % (ref 0–2)
BILIRUB SERPL-MCNC: 0.5 MG/DL (ref 0–1.2)
BUN BLDV-MCNC: 8 MG/DL (ref 6–20)
CALCIUM SERPL-MCNC: 9.6 MG/DL (ref 8.6–10.2)
CHLORIDE BLD-SCNC: 100 MMOL/L (ref 98–107)
CO2: 28 MMOL/L (ref 22–29)
CREAT SERPL-MCNC: 1 MG/DL (ref 0.5–1)
EOSINOPHILS ABSOLUTE: 0.43 E9/L (ref 0.05–0.5)
EOSINOPHILS RELATIVE PERCENT: 8.1 % (ref 0–6)
GFR AFRICAN AMERICAN: >60
GFR NON-AFRICAN AMERICAN: >60 ML/MIN/1.73
GLUCOSE BLD-MCNC: 212 MG/DL (ref 74–99)
HCT VFR BLD CALC: 34 % (ref 34–48)
HEMOGLOBIN: 11.1 G/DL (ref 11.5–15.5)
IMMATURE GRANULOCYTES #: 0.01 E9/L
IMMATURE GRANULOCYTES %: 0.2 % (ref 0–5)
LYMPHOCYTES ABSOLUTE: 2.15 E9/L (ref 1.5–4)
LYMPHOCYTES RELATIVE PERCENT: 40.3 % (ref 20–42)
MCH RBC QN AUTO: 26.5 PG (ref 26–35)
MCHC RBC AUTO-ENTMCNC: 32.6 % (ref 32–34.5)
MCV RBC AUTO: 81.1 FL (ref 80–99.9)
MONOCYTES ABSOLUTE: 0.32 E9/L (ref 0.1–0.95)
MONOCYTES RELATIVE PERCENT: 6 % (ref 2–12)
NEUTROPHILS ABSOLUTE: 2.31 E9/L (ref 1.8–7.3)
NEUTROPHILS RELATIVE PERCENT: 43.3 % (ref 43–80)
PDW BLD-RTO: 14.3 FL (ref 11.5–15)
PLATELET # BLD: 296 E9/L (ref 130–450)
PMV BLD AUTO: 11.7 FL (ref 7–12)
POTASSIUM SERPL-SCNC: 3.4 MMOL/L (ref 3.5–5)
RBC # BLD: 4.19 E12/L (ref 3.5–5.5)
SODIUM BLD-SCNC: 140 MMOL/L (ref 132–146)
TOTAL PROTEIN: 7.7 G/DL (ref 6.4–8.3)
WBC # BLD: 5.3 E9/L (ref 4.5–11.5)

## 2020-08-06 PROCEDURE — 80053 COMPREHEN METABOLIC PANEL: CPT

## 2020-08-06 PROCEDURE — 36415 COLL VENOUS BLD VENIPUNCTURE: CPT

## 2020-08-06 PROCEDURE — 85025 COMPLETE CBC W/AUTO DIFF WBC: CPT

## 2020-08-10 LAB
FUNGUS (MYCOLOGY) CULTURE: NORMAL
FUNGUS STAIN: NORMAL

## 2020-08-25 LAB
AFB CULTURE (MYCOBACTERIA): NORMAL
AFB SMEAR: NORMAL

## 2020-08-31 ENCOUNTER — TELEPHONE (OUTPATIENT)
Dept: CARDIOLOGY | Age: 35
End: 2020-08-31

## 2020-09-03 ENCOUNTER — TELEPHONE (OUTPATIENT)
Dept: CARDIOLOGY | Age: 35
End: 2020-09-03

## 2020-09-03 NOTE — TELEPHONE ENCOUNTER
PT WAS A NO SHOW FOR HER STRESS AND ECHO APPT TODAY. 2ND NO SHOW. CALLED AND LEFT MESSAGE FOR HER TO CALL BACK AND RESCHEDULE.

## 2020-10-09 ENCOUNTER — TELEPHONE (OUTPATIENT)
Dept: CARDIOLOGY | Age: 35
End: 2020-10-09

## 2020-10-09 ENCOUNTER — OFFICE VISIT (OUTPATIENT)
Dept: FAMILY MEDICINE CLINIC | Age: 35
End: 2020-10-09
Payer: COMMERCIAL

## 2020-10-09 VITALS
OXYGEN SATURATION: 98 % | DIASTOLIC BLOOD PRESSURE: 64 MMHG | WEIGHT: 236 LBS | HEART RATE: 70 BPM | TEMPERATURE: 97.2 F | HEIGHT: 65 IN | BODY MASS INDEX: 39.32 KG/M2 | RESPIRATION RATE: 16 BRPM | SYSTOLIC BLOOD PRESSURE: 98 MMHG

## 2020-10-09 LAB — HBA1C MFR BLD: 11.1 %

## 2020-10-09 PROCEDURE — 99213 OFFICE O/P EST LOW 20 MIN: CPT | Performed by: STUDENT IN AN ORGANIZED HEALTH CARE EDUCATION/TRAINING PROGRAM

## 2020-10-09 PROCEDURE — 96160 PT-FOCUSED HLTH RISK ASSMT: CPT | Performed by: STUDENT IN AN ORGANIZED HEALTH CARE EDUCATION/TRAINING PROGRAM

## 2020-10-09 PROCEDURE — 2022F DILAT RTA XM EVC RTNOPTHY: CPT | Performed by: STUDENT IN AN ORGANIZED HEALTH CARE EDUCATION/TRAINING PROGRAM

## 2020-10-09 PROCEDURE — 1036F TOBACCO NON-USER: CPT | Performed by: STUDENT IN AN ORGANIZED HEALTH CARE EDUCATION/TRAINING PROGRAM

## 2020-10-09 PROCEDURE — G8427 DOCREV CUR MEDS BY ELIG CLIN: HCPCS | Performed by: STUDENT IN AN ORGANIZED HEALTH CARE EDUCATION/TRAINING PROGRAM

## 2020-10-09 PROCEDURE — 83036 HEMOGLOBIN GLYCOSYLATED A1C: CPT | Performed by: STUDENT IN AN ORGANIZED HEALTH CARE EDUCATION/TRAINING PROGRAM

## 2020-10-09 PROCEDURE — 3046F HEMOGLOBIN A1C LEVEL >9.0%: CPT | Performed by: STUDENT IN AN ORGANIZED HEALTH CARE EDUCATION/TRAINING PROGRAM

## 2020-10-09 PROCEDURE — G8484 FLU IMMUNIZE NO ADMIN: HCPCS | Performed by: STUDENT IN AN ORGANIZED HEALTH CARE EDUCATION/TRAINING PROGRAM

## 2020-10-09 PROCEDURE — G8417 CALC BMI ABV UP PARAM F/U: HCPCS | Performed by: STUDENT IN AN ORGANIZED HEALTH CARE EDUCATION/TRAINING PROGRAM

## 2020-10-09 RX ORDER — OMEPRAZOLE 40 MG/1
40 CAPSULE, DELAYED RELEASE ORAL 2 TIMES DAILY
COMMUNITY
End: 2021-01-14 | Stop reason: ALTCHOICE

## 2020-10-09 RX ORDER — POTASSIUM CHLORIDE 750 MG/1
10 TABLET, EXTENDED RELEASE ORAL DAILY
Qty: 30 TABLET | Refills: 2 | Status: SHIPPED
Start: 2020-10-09 | End: 2021-01-14 | Stop reason: SDUPTHER

## 2020-10-09 RX ORDER — SERTRALINE HYDROCHLORIDE 25 MG/1
50 TABLET, FILM COATED ORAL DAILY
Qty: 30 TABLET | Refills: 1 | Status: SHIPPED
Start: 2020-10-09 | End: 2021-01-14 | Stop reason: SDUPTHER

## 2020-10-09 RX ORDER — INSULIN GLARGINE 100 [IU]/ML
35 INJECTION, SOLUTION SUBCUTANEOUS NIGHTLY
Qty: 5 PEN | Refills: 3 | Status: SHIPPED
Start: 2020-10-09 | End: 2021-01-14 | Stop reason: SDUPTHER

## 2020-10-09 RX ORDER — CILOSTAZOL 100 MG/1
100 TABLET ORAL 2 TIMES DAILY
Qty: 60 TABLET | Refills: 2 | Status: SHIPPED
Start: 2020-10-09 | End: 2021-01-14 | Stop reason: SDUPTHER

## 2020-10-09 RX ORDER — PANTOPRAZOLE SODIUM 40 MG/1
40 TABLET, DELAYED RELEASE ORAL
Qty: 90 TABLET | Refills: 1 | Status: SHIPPED
Start: 2020-10-09 | End: 2021-01-14 | Stop reason: SDUPTHER

## 2020-10-09 RX ORDER — FAMOTIDINE 20 MG/1
20 TABLET, FILM COATED ORAL 2 TIMES DAILY
Qty: 60 TABLET | Refills: 5 | Status: SHIPPED
Start: 2020-10-09 | End: 2021-01-14 | Stop reason: SDUPTHER

## 2020-10-09 RX ORDER — ASPIRIN 81 MG/1
81 TABLET ORAL DAILY
Qty: 30 TABLET | Refills: 3 | Status: SHIPPED
Start: 2020-10-09 | End: 2021-01-14 | Stop reason: SDUPTHER

## 2020-10-09 ASSESSMENT — PATIENT HEALTH QUESTIONNAIRE - PHQ9
7. TROUBLE CONCENTRATING ON THINGS, SUCH AS READING THE NEWSPAPER OR WATCHING TELEVISION: 0
3. TROUBLE FALLING OR STAYING ASLEEP: 2
10. IF YOU CHECKED OFF ANY PROBLEMS, HOW DIFFICULT HAVE THESE PROBLEMS MADE IT FOR YOU TO DO YOUR WORK, TAKE CARE OF THINGS AT HOME, OR GET ALONG WITH OTHER PEOPLE: 2
5. POOR APPETITE OR OVEREATING: 0
SUM OF ALL RESPONSES TO PHQ QUESTIONS 1-9: 10
2. FEELING DOWN, DEPRESSED OR HOPELESS: 3
6. FEELING BAD ABOUT YOURSELF - OR THAT YOU ARE A FAILURE OR HAVE LET YOURSELF OR YOUR FAMILY DOWN: 1
SUM OF ALL RESPONSES TO PHQ9 QUESTIONS 1 & 2: 4
4. FEELING TIRED OR HAVING LITTLE ENERGY: 3
8. MOVING OR SPEAKING SO SLOWLY THAT OTHER PEOPLE COULD HAVE NOTICED. OR THE OPPOSITE, BEING SO FIGETY OR RESTLESS THAT YOU HAVE BEEN MOVING AROUND A LOT MORE THAN USUAL: 0
1. LITTLE INTEREST OR PLEASURE IN DOING THINGS: 1
9. THOUGHTS THAT YOU WOULD BE BETTER OFF DEAD, OR OF HURTING YOURSELF: 0
SUM OF ALL RESPONSES TO PHQ QUESTIONS 1-9: 10

## 2020-10-09 ASSESSMENT — COLUMBIA-SUICIDE SEVERITY RATING SCALE - C-SSRS
1. WITHIN THE PAST MONTH, HAVE YOU WISHED YOU WERE DEAD OR WISHED YOU COULD GO TO SLEEP AND NOT WAKE UP?: NO
6. HAVE YOU EVER DONE ANYTHING, STARTED TO DO ANYTHING, OR PREPARED TO DO ANYTHING TO END YOUR LIFE?: NO
2. HAVE YOU ACTUALLY HAD ANY THOUGHTS OF KILLING YOURSELF?: NO

## 2020-10-09 NOTE — PROGRESS NOTES
S: 37 y.o. female with   Chief Complaint   Patient presents with    Diabetes    Gastroesophageal Reflux     patient states that she is taking pepcid/omeprazole: needs refills    Depression     positive score of 11       DM - noncompliance with tx is an issue. A1C poorly controlled. Rarely checking GLC. No logs present. Hypokalemia      BP Readings from Last 3 Encounters:   10/09/20 98/64   08/11/20 113/67   07/28/20 103/67       O: VS:  height is 5' 5\" (1.651 m) and weight is 236 lb (107 kg). Her temporal temperature is 97.2 °F (36.2 °C). Her blood pressure is 98/64 and her pulse is 70. Her respiration is 16 and oxygen saturation is 98%. AAO/NAD, appropriate affect for mood  CV:  RRR, no murmur  Resp: CTAB      Impression/Plan:   1) DM - poor compliance with tx - refer to endo again (missed prior appts)  2) Gastroparesis/Abd Pain - treated with current plan PPI, H2RA, Bentyl  3) Fatigue - ?secondary to poor glc control      Health Maintenance Due   Topic Date Due    Varicella vaccine (1 of 2 - 2-dose childhood series) 11/27/1984    Pneumococcal 0-64 years Vaccine (1 of 1 - PPSV23) 11/27/1989    Diabetic retinal exam  11/27/1993    Lipid screen  11/27/1993    HIV screen  11/27/1998    Diabetic microalbuminuria test  11/27/2001    Hepatitis B vaccine (1 of 3 - Risk 3-dose series) 11/27/2002    Cervical cancer screen  11/27/2004    Flu vaccine (1) 09/01/2020    A1C test (Diabetic or Prediabetic)  10/05/2020         Attending Physician Statement  I have discussed the case, including pertinent history and exam findings with the resident. I agree with the documented assessment and plan.       Ankit St MD

## 2020-10-09 NOTE — PROGRESS NOTES
[facility]  Family Medicine Residency Program  Phone: [phone]  Fax: [phone]    Patient:  Marie Veloz 37 y.o. female                                 Date of Service: 10/9/20                            Chiefcomplaint:   Chief Complaint   Patient presents with    Diabetes    Gastroesophageal Reflux     patient states that she is taking pepcid/omeprazole: needs refills    Depression     positive score of 11         History of Present Illness: The patient is a 37 y.o. female  presented to the clinic for follow up for diabetes. - She is known patient with diabetes has had ray amputation of foot, she had a IV antibiotic intrapartum for 28 days in July. --A1c done last time was 10.1, now more than 11, she is on insulin  --She has a referral to endocrinologist Dr. Flora Garcia in the past , but missed appointment, wants another referral  -Patient has had epigastric pain with nausea, better with combination of PPI and famotidine, wants refill of medication, but denied vomiting, blood in stool. But admits acid reflex.  -She had had adrenal insufficiency with hypokalemia,wants to review with endocrinologist.  -Patient has had loose stool ,on and off, takes Imodium for loose stool, today she does not have diarrhea  -Patient admits fatigue:She thinks that it might be due to combination of diabetes, and adrenal insufficiency, low food intake due to nausea. -Patient denied chest pain, shortness of breath, fever or chills, calf pain and   Swelling, diabetic ulcer/infection. Review of Systems:   Review of Systems   Constitutional: Positive for appetite change and fatigue. Negative for chills and fever. Eyes: Negative. Negative for redness. Respiratory: Negative. Negative for cough, chest tightness and shortness of breath. Cardiovascular: Negative for chest pain, palpitations and leg swelling. Gastrointestinal: Positive for abdominal pain and nausea.  Negative for blood in stool, diarrhea and vomiting. Endocrine: Negative. Negative for polyphagia and polyuria. Genitourinary: Negative. Negative for hematuria and pelvic pain. Musculoskeletal: Positive for myalgias. Skin: Negative for pallor, rash and wound. Allergic/Immunologic: Negative. Neurological: Positive for weakness. Negative for dizziness, tremors, seizures, syncope, light-headedness and headaches. Hematological: Negative. Psychiatric/Behavioral: Positive for decreased concentration and dysphoric mood. Negative for confusion and suicidal ideas. The patient is nervous/anxious.         Past Medical History:      Diagnosis Date    Cellulitis of foot, right 2020    Diabetes mellitus ([facility] [address]     Failed skin graft 2020    Ischemic toe 2020    Skin flap necrosis ([facility] [address] 2020       Past Surgical History:        Procedure Laterality Date     SECTION      CHOLECYSTECTOMY      FOOT DEBRIDEMENT Right 2020    RIGHT 2ND TOE  INCISION AND DRAINAGE performed by Chayito Robert DPM at [address] [place] [address],[facility] Right 7/10/2020    RIGHT FOOT INCISION AND DRAINAGE WITH AMPUTATION 3RD TOE, DELAY PRIMARY CLOSURE [place] Tirso Sanabria [place] [facility] [place]       Allergies:    Rocephin [ceftriaxone]    Social History:   Social History     Socioeconomic History    Marital status:      Spouse name: Not on file    Number of children: Not on file    Years of education: Not on file    Highest education level: Not on file   Occupational History    Not on file   Social Needs    Financial resource strain: Not on file    Food insecurity     Worry: Not on file     Inability: Not on file    Transportation needs     Medical: Not on file     Non-medical: Not on file   Tobacco Use    Smoking status: Passive Smoke Exposure - Never Smoker    Smokeless tobacco: Never Used   Substance and Sexual Activity    Alcohol use: No    Drug use: No    Sexual activity: Not Currently     Partners: gait and coordination  No focal neurological deficits appreaciated         Psychiatric: has a normal mood and affect. Behavior is normal.       Assessment and Plan:         Tod Rosales was seen today for diabetes, gastroesophageal reflux and depression. Diagnoses and all orders for this visit:    Type 2 diabetes mellitus with other specified complication, with long-term current use of insulin ([facility])  -     POCT glycosylated hemoglobin (Hb A1C)    -She is a known patient with type 2 diabetes, on Lantus 35 U and insulin 9U three times a day and victoza 1.2mg daily, patient is a noncompliant  - A1c 11.1 increased from 10.1 in the past  -We had suggested to check blood sugar 3 times a day and with a log,she did not do that  -We have made referral to endocrinologist  -Planning to refer to  diabetes education  -We discussed regarding importance of checking blood sugar regularly and putting log report , patient and patient body understand it, but patient is noncompliant. Suggested to take insulin on time and check blood glucose. Adrenal insufficiency ([facility] [address]  -Patient has had history of adenomatous insufficiency  -She has a low potassium too, her sodium was 140, potassium 3.4 calcium 9.6  -On her blood pressure taken today was slightly low, 98/64  -We have given potassium supplementation  -We have suggested to follow-up with endocrinologist  -We have made referral to Dr. Trever Christianson     Gastroparesis  -Patient has had nausea, without vomiting, feels abdominal bloating  -She has had epigastric pain with acid reflux  -She is a known patient with diabetes on insulin  -She has been taking PPI in the H2 blocker, that has been helping her, we have refilled them.   -We will plan to add prokinetics if she has persistent nausea and did not get better in follow-up visit    Epigastric pain   - she was taking PPI and H2 blocker in the past  - she wants us to refill the meds     Depression  -Patient has positive depression score;11  -Patient  stated that she has had low mood and anhedonia, mo suicidal ideation  -She thinks that it might be due to her chronic illness  -She is okay to start low-dose of Zoloft 25 mg daily  -We will do close follow-up      I encourage further reading and education about your health conditions. Information on many healthconditions is provided by the American Academy of [employer]: https://familydoctor. org/  Please bring any questions to me at your next visit. Return to Office: No follow-ups on file.     Medication List:    Current Outpatient Medications   Medication Sig Dispense Refill    omeprazole (PRILOSEC) 40 MG delayed release capsule Take 40 mg by mouth 2 times daily      famotidine (PEPCID) 20 MG tablet Take 1 tablet by mouth 2 times daily 60 tablet 5    ondansetron (ZOFRAN ODT) 4 MG disintegrating tablet Take 1 tablet by mouth every 8 hours as needed for Nausea or Vomiting 24 tablet 0    dicyclomine (BENTYL) 10 MG capsule Take 2 capsules by mouth 4 times daily (before meals and nightly) 15 capsule none    aspirin 81 MG EC tablet Take 1 tablet by mouth daily 30 tablet 3    cilostazol (PLETAL) 100 MG tablet Take 1 tablet by mouth 2 times daily 60 tablet 2    cetirizine (ZYRTEC ALLERGY) 10 MG tablet Take 1 tablet by mouth daily 30 tablet 0    Liraglutide (VICTOZA) 18 MG/3ML SOPN SC injection Inject 1.2 mg into the skin daily Take 1.2 mg for 1 week then increase to 1.8 mg thereafter      insulin glargine (BASAGLAR KWIKPEN) 100 UNIT/ML injection pen Inject 25 Units into the skin nightly (Patient taking differently: Inject 35 Units into the skin nightly ) 5 pen 3    insulin lispro (HUMALOG) 100 UNIT/ML injection vial Take 5 units of Humalog with eat meal  plus If blood sugars are 150-200 -add 1 units of Humalog If blood sugars are 201-250 - add 2 units of Humalog If blood sugars are 251-300 - add 3 units of Humalog If blood sugars are 301-350 - add 4 units of Humalog If  above 350 - add 5 unit of humalog 1 vial 3    GABAPENTIN PO Take 600 mg by mouth 2 times daily (with meals) Take 800 mg bid and 1600 mg hs       No current facility-administered medications for this visit. Uli Liu MD       This document may have been prepared at least partiallythrough the use of voice recognition software. Although effort is taken to assure the accuracy of this document, it is possible that grammatical, syntax,  or spelling errors may occur.

## 2020-10-12 ASSESSMENT — ENCOUNTER SYMPTOMS
DIARRHEA: 0
BLOOD IN STOOL: 0
ABDOMINAL PAIN: 1
NAUSEA: 1
CHEST TIGHTNESS: 0
SHORTNESS OF BREATH: 0
VOMITING: 0
RESPIRATORY NEGATIVE: 1
EYE REDNESS: 0
COUGH: 0
EYES NEGATIVE: 1
ALLERGIC/IMMUNOLOGIC NEGATIVE: 1

## 2020-10-13 ENCOUNTER — HOSPITAL ENCOUNTER (OUTPATIENT)
Dept: CARDIOLOGY | Age: 35
Discharge: HOME | End: 2020-10-13
Payer: COMMERCIAL

## 2020-10-13 VITALS
RESPIRATION RATE: 20 BRPM | SYSTOLIC BLOOD PRESSURE: 90 MMHG | TEMPERATURE: 97.1 F | BODY MASS INDEX: 41.41 KG/M2 | WEIGHT: 225 LBS | HEART RATE: 100 BPM | HEIGHT: 62 IN | DIASTOLIC BLOOD PRESSURE: 60 MMHG

## 2020-10-13 LAB
LV EF: 60 %
LVEF MODALITY: NORMAL

## 2020-10-13 PROCEDURE — 93017 CV STRESS TEST TRACING ONLY: CPT

## 2020-10-13 PROCEDURE — 6360000002 HC RX W HCPCS: Performed by: INTERNAL MEDICINE

## 2020-10-13 PROCEDURE — 78452 HT MUSCLE IMAGE SPECT MULT: CPT

## 2020-10-13 PROCEDURE — 2580000003 HC RX 258: Performed by: INTERNAL MEDICINE

## 2020-10-13 PROCEDURE — 3430000000 HC RX DIAGNOSTIC RADIOPHARMACEUTICAL: Performed by: INTERNAL MEDICINE

## 2020-10-13 PROCEDURE — 93306 TTE W/DOPPLER COMPLETE: CPT

## 2020-10-13 PROCEDURE — A9502 TC99M TETROFOSMIN: HCPCS | Performed by: INTERNAL MEDICINE

## 2020-10-13 RX ORDER — SODIUM CHLORIDE 0.9 % (FLUSH) 0.9 %
10 SYRINGE (ML) INJECTION PRN
Status: DISCONTINUED | OUTPATIENT
Start: 2020-10-13 | End: 2020-10-14 | Stop reason: HOSPADM

## 2020-10-13 RX ADMIN — TETROFOSMIN 8.7 MILLICURIE: 0.23 INJECTION, POWDER, LYOPHILIZED, FOR SOLUTION INTRAVENOUS at 08:18

## 2020-10-13 RX ADMIN — SODIUM CHLORIDE, PRESERVATIVE FREE 10 ML: 5 INJECTION INTRAVENOUS at 10:19

## 2020-10-13 RX ADMIN — SODIUM CHLORIDE, PRESERVATIVE FREE 10 ML: 5 INJECTION INTRAVENOUS at 10:10

## 2020-10-13 RX ADMIN — REGADENOSON 0.4 MG: 0.08 INJECTION, SOLUTION INTRAVENOUS at 10:19

## 2020-10-13 RX ADMIN — TETROFOSMIN 26.7 MILLICURIE: 0.23 INJECTION, POWDER, LYOPHILIZED, FOR SOLUTION INTRAVENOUS at 10:19

## 2020-10-13 RX ADMIN — SODIUM CHLORIDE, PRESERVATIVE FREE 10 ML: 5 INJECTION INTRAVENOUS at 08:18

## 2020-10-13 NOTE — PROCEDURES
[id number] [facility],[address] [facility] and Vascular Lab - [address] CAPRI pacheco, [address] [place] [address]  [phone]               Pharmacologic Stress Nuclear Gated SPECT Study    Name: Eli [address] Account Number: [de-identified]    :  1985          Sex: female         Date of Study:  10/13/2020    Height: 5' 2\" (157.5 cm)         Weight: 225 lb (102.1 kg)     Ordering Provider: lIda Jolly          PCP: Uli Liu MD      Cardiologist: Ilda Jolly             Interpreting Physician: Shobha Donaldson  _________________________________________________________________________________    Indication:   Detecting the presence and location of coronary artery disease    Clinical History:   Patient has no known history of coronary artery disease. Resting ECG:      Sinus rhythm, Inferior, anterior ST/T changes    Procedure:   Pharmacologic stress testing was performed with regadenoson 0.4 mg for 15 seconds. The heart rate was 95 at baseline and maria c to 113 beats during the infusion. The blood pressure at baseline was 80/60 and blood pressure at the end of infusion was 106/60. Blood pressure response was hypotensive during the stress procedure,patient was give oral fluids for hypotension. The patient experienced headache,chest pressure,and dizziness during the infusion, which resolved. ECG during the infusion did not change. IMAGING: Myocardial perfusion imaging was performed at rest 30-35 minutes following the intravenous injection of 8.7 mCi of (Tc-tetrofosmin) followed by 10 ml of Normal Saline. As per infusion protocol, the patient was injected intravenously with 26.7 mCi of (Tc-tetrofosmin) followed by 10 ml of Normal Saline. Gated post-stress tomographic imaging was performed 45 minutes after stress. FINDINGS: The overall quality of the study was good.      Left ventricular cavity size was noted to be normal.    Rotational analog analysis demonstrated no patient motion or abnormal extracardiac radioactivity. A moderate defect was present in the mid anterior wall(s) that was small size on the post regadenoson images           The resting images are show no change. Likely attenuation artifact. Gated SPECT left ventricular ejection fraction was calculated to be 75%, with normal myocardial thickening and wall motion. Impression:    1. Electrocardiographically normal regadenoson infusion with a clinicallynonischemic response. 2. Myocardial perfusion imaging was normal.  Likely anterior attenuation artifact. 3. Overall left ventricular systolic function was normal without regional wall motion abnormalities. EF 75%. 4.   Low risk general pharmacologic stress test    No recent study to compare. Thank you for sending your patient to this [employer].      Electronically signed by Monet Ramirez MD on 10/14/2020 at 3:15 PM

## 2020-10-16 ENCOUNTER — HOSPITAL ENCOUNTER (OUTPATIENT)
Age: 35
Discharge: HOME | End: 2020-10-16
Payer: COMMERCIAL

## 2020-10-16 LAB
ALBUMIN SERPL-MCNC: 3.5 G/DL (ref 3.5–5.2)
ALP BLD-CCNC: 110 U/L (ref 35–104)
ALT SERPL-CCNC: 34 U/L (ref 0–32)
ANION GAP SERPL CALCULATED.3IONS-SCNC: 14 MMOL/L (ref 7–16)
AST SERPL-CCNC: 69 U/L (ref 0–31)
BASOPHILS ABSOLUTE: 0.07 E9/L (ref 0–0.2)
BASOPHILS RELATIVE PERCENT: 1.1 % (ref 0–2)
BILIRUB SERPL-MCNC: 0.7 MG/DL (ref 0–1.2)
BUN BLDV-MCNC: 8 MG/DL (ref 6–20)
C-REACTIVE PROTEIN: 1.2 MG/DL (ref 0–0.4)
CALCIUM SERPL-MCNC: 9.1 MG/DL (ref 8.6–10.2)
CHLORIDE BLD-SCNC: 95 MMOL/L (ref 98–107)
CO2: 24 MMOL/L (ref 22–29)
CREAT SERPL-MCNC: 1 MG/DL (ref 0.5–1)
EOSINOPHILS ABSOLUTE: 0.33 E9/L (ref 0.05–0.5)
EOSINOPHILS RELATIVE PERCENT: 5 % (ref 0–6)
GFR AFRICAN AMERICAN: >60
GFR NON-AFRICAN AMERICAN: >60 ML/MIN/1.73
GLUCOSE BLD-MCNC: 244 MG/DL (ref 74–99)
HCT VFR BLD CALC: 37.7 % (ref 34–48)
HEMOGLOBIN: 12.4 G/DL (ref 11.5–15.5)
IMMATURE GRANULOCYTES #: 0.02 E9/L
IMMATURE GRANULOCYTES %: 0.3 % (ref 0–5)
LYMPHOCYTES ABSOLUTE: 2.37 E9/L (ref 1.5–4)
LYMPHOCYTES RELATIVE PERCENT: 36.2 % (ref 20–42)
MCH RBC QN AUTO: 26.4 PG (ref 26–35)
MCHC RBC AUTO-ENTMCNC: 32.9 % (ref 32–34.5)
MCV RBC AUTO: 80.4 FL (ref 80–99.9)
MONOCYTES ABSOLUTE: 0.45 E9/L (ref 0.1–0.95)
MONOCYTES RELATIVE PERCENT: 6.9 % (ref 2–12)
NEUTROPHILS ABSOLUTE: 3.31 E9/L (ref 1.8–7.3)
NEUTROPHILS RELATIVE PERCENT: 50.5 % (ref 43–80)
PDW BLD-RTO: 14 FL (ref 11.5–15)
PLATELET # BLD: 268 E9/L (ref 130–450)
PMV BLD AUTO: 11.9 FL (ref 7–12)
POTASSIUM SERPL-SCNC: 3.9 MMOL/L (ref 3.5–5)
RBC # BLD: 4.69 E12/L (ref 3.5–5.5)
SEDIMENTATION RATE, ERYTHROCYTE: 52 MM/HR (ref 0–20)
SODIUM BLD-SCNC: 133 MMOL/L (ref 132–146)
TOTAL PROTEIN: 7.3 G/DL (ref 6.4–8.3)
WBC # BLD: 6.6 E9/L (ref 4.5–11.5)

## 2020-10-16 PROCEDURE — 80053 COMPREHEN METABOLIC PANEL: CPT

## 2020-10-16 PROCEDURE — 85025 COMPLETE CBC W/AUTO DIFF WBC: CPT

## 2020-10-16 PROCEDURE — 86140 C-REACTIVE PROTEIN: CPT

## 2020-10-16 PROCEDURE — 36415 COLL VENOUS BLD VENIPUNCTURE: CPT

## 2020-10-16 PROCEDURE — 85651 RBC SED RATE NONAUTOMATED: CPT

## 2020-10-22 ENCOUNTER — HOSPITAL ENCOUNTER (EMERGENCY)
Age: 35
Discharge: HOME | End: 2020-10-22
Attending: EMERGENCY MEDICINE
Payer: COMMERCIAL

## 2020-10-22 VITALS
OXYGEN SATURATION: 99 % | RESPIRATION RATE: 16 BRPM | HEIGHT: 62 IN | SYSTOLIC BLOOD PRESSURE: 102 MMHG | TEMPERATURE: 98.3 F | BODY MASS INDEX: 43.43 KG/M2 | HEART RATE: 83 BPM | WEIGHT: 236 LBS | DIASTOLIC BLOOD PRESSURE: 60 MMHG

## 2020-10-22 LAB
ANION GAP SERPL CALCULATED.3IONS-SCNC: 11 MMOL/L (ref 7–16)
BASOPHILS ABSOLUTE: 0.05 E9/L (ref 0–0.2)
BASOPHILS RELATIVE PERCENT: 0.9 % (ref 0–2)
BUN BLDV-MCNC: 5 MG/DL (ref 6–20)
CALCIUM SERPL-MCNC: 9.6 MG/DL (ref 8.6–10.2)
CHLORIDE BLD-SCNC: 99 MMOL/L (ref 98–107)
CO2: 26 MMOL/L (ref 22–29)
CREAT SERPL-MCNC: 1 MG/DL (ref 0.5–1)
EOSINOPHILS ABSOLUTE: 0.24 E9/L (ref 0.05–0.5)
EOSINOPHILS RELATIVE PERCENT: 4.1 % (ref 0–6)
GFR AFRICAN AMERICAN: >60
GFR NON-AFRICAN AMERICAN: >60 ML/MIN/1.73
GLUCOSE BLD-MCNC: 235 MG/DL (ref 74–99)
HCT VFR BLD CALC: 36.1 % (ref 34–48)
HEMOGLOBIN: 11.8 G/DL (ref 11.5–15.5)
IMMATURE GRANULOCYTES #: 0.01 E9/L
IMMATURE GRANULOCYTES %: 0.2 % (ref 0–5)
LYMPHOCYTES ABSOLUTE: 2.3 E9/L (ref 1.5–4)
LYMPHOCYTES RELATIVE PERCENT: 39.4 % (ref 20–42)
MCH RBC QN AUTO: 26.1 PG (ref 26–35)
MCHC RBC AUTO-ENTMCNC: 32.7 % (ref 32–34.5)
MCV RBC AUTO: 79.9 FL (ref 80–99.9)
MONOCYTES ABSOLUTE: 0.36 E9/L (ref 0.1–0.95)
MONOCYTES RELATIVE PERCENT: 6.2 % (ref 2–12)
NEUTROPHILS ABSOLUTE: 2.88 E9/L (ref 1.8–7.3)
NEUTROPHILS RELATIVE PERCENT: 49.2 % (ref 43–80)
PDW BLD-RTO: 14.2 FL (ref 11.5–15)
PLATELET # BLD: 301 E9/L (ref 130–450)
PMV BLD AUTO: 11.2 FL (ref 7–12)
POTASSIUM REFLEX MAGNESIUM: 3.9 MMOL/L (ref 3.5–5)
RBC # BLD: 4.52 E12/L (ref 3.5–5.5)
SODIUM BLD-SCNC: 136 MMOL/L (ref 132–146)
TSH SERPL DL<=0.05 MIU/L-ACNC: 2.77 UIU/ML (ref 0.27–4.2)
WBC # BLD: 5.8 E9/L (ref 4.5–11.5)

## 2020-10-22 PROCEDURE — 6360000002 HC RX W HCPCS: Performed by: STUDENT IN AN ORGANIZED HEALTH CARE EDUCATION/TRAINING PROGRAM

## 2020-10-22 PROCEDURE — 85025 COMPLETE CBC W/AUTO DIFF WBC: CPT

## 2020-10-22 PROCEDURE — 96374 THER/PROPH/DIAG INJ IV PUSH: CPT

## 2020-10-22 PROCEDURE — 80048 BASIC METABOLIC PNL TOTAL CA: CPT

## 2020-10-22 PROCEDURE — 2580000003 HC RX 258: Performed by: STUDENT IN AN ORGANIZED HEALTH CARE EDUCATION/TRAINING PROGRAM

## 2020-10-22 PROCEDURE — 99283 EMERGENCY DEPT VISIT LOW MDM: CPT

## 2020-10-22 PROCEDURE — 84443 ASSAY THYROID STIM HORMONE: CPT

## 2020-10-22 RX ORDER — 0.9 % SODIUM CHLORIDE 0.9 %
1000 INTRAVENOUS SOLUTION INTRAVENOUS ONCE
Status: COMPLETED | OUTPATIENT
Start: 2020-10-22 | End: 2020-10-22

## 2020-10-22 RX ORDER — IBUPROFEN 600 MG/1
600 TABLET ORAL 4 TIMES DAILY PRN
Qty: 40 TABLET | Refills: 0 | Status: SHIPPED | OUTPATIENT
Start: 2020-10-22 | End: 2021-02-25 | Stop reason: SDUPTHER

## 2020-10-22 RX ORDER — IBUPROFEN 600 MG/1
600 TABLET ORAL 4 TIMES DAILY PRN
Qty: 40 TABLET | Refills: 0 | Status: SHIPPED | OUTPATIENT
Start: 2020-10-22 | End: 2020-10-22 | Stop reason: CLARIF

## 2020-10-22 RX ADMIN — SODIUM CHLORIDE 1000 ML: 9 INJECTION, SOLUTION INTRAVENOUS at 20:42

## 2020-10-22 RX ADMIN — HYDROCORTISONE SODIUM SUCCINATE 100 MG: 100 INJECTION, POWDER, FOR SOLUTION INTRAMUSCULAR; INTRAVENOUS at 20:43

## 2020-10-22 ASSESSMENT — ENCOUNTER SYMPTOMS
NAUSEA: 0
VOMITING: 0
SHORTNESS OF BREATH: 0
COUGH: 0
CONSTIPATION: 0
ABDOMINAL DISTENTION: 0
BLOOD IN STOOL: 0
SINUS PRESSURE: 0
DIARRHEA: 0
CHEST TIGHTNESS: 0
ABDOMINAL PAIN: 0

## 2020-10-23 NOTE — ED PROVIDER NOTES
HPI   Patient a 41-year-old female presents with chief complaint of fatigue. Patient has a past medical history of orthostatic hypotension, adrenal insufficiency, diabetes. Patient states that she woke up this morning and had significant fatigue. Patient states that no matter what she does she is feeling tired. Patient denies any fevers or chills. Patient notes that she has a chronic cough secondary to allergies. Patient has not been taking her allergy medication. Patient denies any recent Covid contacts. Patient states that she has been otherwise feeling well prior to this. Patient has chronic abdominal pain that has not changed. Patient notes that she also has chronic diarrhea that has not changed. Patient denies any fevers, chills, headache, changes in vision, and any trauma, and changing urinary output. Review of Systems   Constitutional: Positive for fatigue. Negative for activity change, appetite change, chills, diaphoresis and fever. HENT: Negative for congestion, sinus pressure and sneezing. Respiratory: Negative for cough, chest tightness and shortness of breath. Cardiovascular: Negative for chest pain and palpitations. Gastrointestinal: Negative for abdominal distention, abdominal pain, blood in stool, constipation, diarrhea, nausea and vomiting. Genitourinary: Negative for decreased urine volume, difficulty urinating, frequency and urgency. Neurological: Negative for dizziness, weakness and headaches. Psychiatric/Behavioral: Negative for agitation and confusion. Physical Exam  Constitutional:       General: She is not in acute distress. Appearance: Normal appearance. She is obese. She is not ill-appearing. HENT:      Head: Normocephalic and atraumatic. Right Ear: External ear normal.      Left Ear: External ear normal.      Nose: Nose normal. No congestion or rhinorrhea.       Mouth/Throat:      Mouth: Mucous membranes are moist.      Pharynx: No oropharyngeal exudate or posterior oropharyngeal erythema. Eyes:      Extraocular Movements: Extraocular movements intact. Pupils: Pupils are equal, round, and reactive to light. Cardiovascular:      Rate and Rhythm: Normal rate and regular rhythm. Pulses: Normal pulses. Heart sounds: Normal heart sounds. No murmur. Pulmonary:      Effort: Pulmonary effort is normal. No respiratory distress. Breath sounds: Normal breath sounds. No stridor. No wheezing or rhonchi. Abdominal:      General: Abdomen is flat. There is no distension. Palpations: Abdomen is soft. Tenderness: There is no abdominal tenderness. There is no guarding. Musculoskeletal:         General: No swelling or tenderness. Skin:     General: Skin is warm and dry. Capillary Refill: Capillary refill takes less than 2 seconds. Neurological:      General: No focal deficit present. Mental Status: She is alert and oriented to person, place, and time. Psychiatric:         Mood and Affect: Mood normal.         Behavior: Behavior normal.          Procedures     [facility]     ED Course as of Oct 22 2255   Thu Oct 22, 2020   2215 Patient was informed of today's results. Patient stated that she feels better. Patient was educated that her thyroid is normal.  Patient was given return precautions. Patient was educated on her hyperglycemia as well as her low blood pressure. Patient will have a repeat blood pressure prior to discharge. Patient is agreeable to this plan. Patient expressed concern over her previous low chloride levels as well as elevated ESR levels. Patient was educated on the meaning of those lab findings. Patient felt reassured. [JM]      ED Course User Index  [JM] Josue Agustin MD      Patient is a 73-year-old female presents with chief complaint of fatigue. Patient stated that this started today. Patient has a history of adrenal hyperplasia. Patient was given a dose of Solu-Cortef. notes within the ED encounter and vital signs as below have been reviewed. /60   Pulse 83   Temp 98.3 °F (36.8 °C)   Resp 16   Ht 5' 2\" (1.575 m)   Wt 236 lb (107 kg)   SpO2 99%   BMI 43.16 kg/m²   Oxygen Saturation Interpretation: Normal      ------------------------------------------ PROGRESS NOTES ------------------------------------------  10:57 PM EDT  I have spoken with the patient and discussed todays results, in addition to providing specific details for the plan of care and counseling regarding the diagnosis and prognosis. Their questions are answered at this time and they are agreeable with the plan. I discussed at length with them reasons for immediate return here for re evaluation. They will followup with their primary care physician by calling their office tomorrow. --------------------------------- ADDITIONAL PROVIDER NOTES ---------------------------------  At this time the patient is without objective evidence of an acute process requiring hospitalization or inpatient management. They have remained hemodynamically stable throughout their entire ED visit and are stable for discharge with outpatient follow-up. The plan has been discussed in detail and they are aware of the specific conditions for emergent return, as well as the importance of follow-up. Discharge Medication List as of 10/22/2020 10:15 PM          Diagnosis:  1. Fatigue, unspecified type    2. Hyperglycemia        Disposition:  Patient's disposition: Discharge to home  Patient's condition is stable.            Zakiya Harris MD  Resident  10/22/20 5954

## 2020-10-28 ENCOUNTER — TELEPHONE (OUTPATIENT)
Dept: CARDIOLOGY CLINIC | Age: 35
End: 2020-10-28

## 2021-01-14 ENCOUNTER — VIRTUAL VISIT (OUTPATIENT)
Dept: FAMILY MEDICINE CLINIC | Age: 36
End: 2021-01-14
Payer: COMMERCIAL

## 2021-01-14 DIAGNOSIS — M54.2 NECK PAIN, ACUTE: ICD-10-CM

## 2021-01-14 DIAGNOSIS — E27.40 ADRENAL INSUFFICIENCY (HCC): ICD-10-CM

## 2021-01-14 DIAGNOSIS — L30.9 ECZEMA, UNSPECIFIED TYPE: ICD-10-CM

## 2021-01-14 DIAGNOSIS — F32.A DEPRESSIVE DISORDER: ICD-10-CM

## 2021-01-14 DIAGNOSIS — E11.69 TYPE 2 DIABETES MELLITUS WITH OTHER SPECIFIED COMPLICATION, WITH LONG-TERM CURRENT USE OF INSULIN (HCC): ICD-10-CM

## 2021-01-14 DIAGNOSIS — Z79.4 TYPE 2 DIABETES MELLITUS WITH OTHER SPECIFIED COMPLICATION, WITH LONG-TERM CURRENT USE OF INSULIN (HCC): ICD-10-CM

## 2021-01-14 DIAGNOSIS — K31.84 GASTROPARESIS: ICD-10-CM

## 2021-01-14 PROCEDURE — 2022F DILAT RTA XM EVC RTNOPTHY: CPT | Performed by: STUDENT IN AN ORGANIZED HEALTH CARE EDUCATION/TRAINING PROGRAM

## 2021-01-14 PROCEDURE — 99213 OFFICE O/P EST LOW 20 MIN: CPT | Performed by: STUDENT IN AN ORGANIZED HEALTH CARE EDUCATION/TRAINING PROGRAM

## 2021-01-14 PROCEDURE — 3046F HEMOGLOBIN A1C LEVEL >9.0%: CPT | Performed by: STUDENT IN AN ORGANIZED HEALTH CARE EDUCATION/TRAINING PROGRAM

## 2021-01-14 PROCEDURE — G8427 DOCREV CUR MEDS BY ELIG CLIN: HCPCS | Performed by: STUDENT IN AN ORGANIZED HEALTH CARE EDUCATION/TRAINING PROGRAM

## 2021-01-14 RX ORDER — PANTOPRAZOLE SODIUM 40 MG/1
40 TABLET, DELAYED RELEASE ORAL
Qty: 30 TABLET | Refills: 1 | Status: SHIPPED | OUTPATIENT
Start: 2021-01-14 | End: 2021-02-25 | Stop reason: SDUPTHER

## 2021-01-14 RX ORDER — SERTRALINE HYDROCHLORIDE 25 MG/1
50 TABLET, FILM COATED ORAL DAILY
Qty: 60 TABLET | Refills: 2 | Status: SHIPPED | OUTPATIENT
Start: 2021-01-14 | End: 2021-02-25 | Stop reason: SDUPTHER

## 2021-01-14 RX ORDER — SENNOSIDES 8.6 MG
650 CAPSULE ORAL EVERY 8 HOURS PRN
Qty: 30 TABLET | Refills: 1 | Status: SHIPPED | OUTPATIENT
Start: 2021-01-14 | End: 2021-02-25 | Stop reason: SDUPTHER

## 2021-01-14 RX ORDER — DICYCLOMINE HYDROCHLORIDE 10 MG/1
20 CAPSULE ORAL
Qty: 15 CAPSULE | Refills: 0 | Status: SHIPPED | OUTPATIENT
Start: 2021-01-14 | End: 2021-09-29 | Stop reason: SDUPTHER

## 2021-01-14 RX ORDER — INSULIN GLARGINE 100 [IU]/ML
40 INJECTION, SOLUTION SUBCUTANEOUS NIGHTLY
Qty: 5 PEN | Refills: 1 | Status: SHIPPED | OUTPATIENT
Start: 2021-01-14 | End: 2021-02-25 | Stop reason: SDUPTHER

## 2021-01-14 RX ORDER — LIRAGLUTIDE 6 MG/ML
1.2 INJECTION SUBCUTANEOUS DAILY
Qty: 2 PEN | Refills: 1 | Status: SHIPPED | OUTPATIENT
Start: 2021-01-14 | End: 2021-02-25 | Stop reason: SDUPTHER

## 2021-01-14 RX ORDER — ASPIRIN 81 MG/1
81 TABLET ORAL DAILY
Qty: 30 TABLET | Refills: 1 | Status: SHIPPED | OUTPATIENT
Start: 2021-01-14 | End: 2021-02-25 | Stop reason: SDUPTHER

## 2021-01-14 RX ORDER — POTASSIUM CHLORIDE 750 MG/1
10 TABLET, EXTENDED RELEASE ORAL DAILY
Qty: 30 TABLET | Refills: 1 | Status: SHIPPED | OUTPATIENT
Start: 2021-01-14 | End: 2021-02-25 | Stop reason: SDUPTHER

## 2021-01-14 RX ORDER — CILOSTAZOL 100 MG/1
100 TABLET ORAL 2 TIMES DAILY
Qty: 60 TABLET | Refills: 1 | Status: SHIPPED | OUTPATIENT
Start: 2021-01-14 | End: 2021-02-25 | Stop reason: SDUPTHER

## 2021-01-14 RX ORDER — FAMOTIDINE 20 MG/1
20 TABLET, FILM COATED ORAL 2 TIMES DAILY
Qty: 60 TABLET | Refills: 1 | Status: SHIPPED | OUTPATIENT
Start: 2021-01-14 | End: 2021-02-25 | Stop reason: SDUPTHER

## 2021-01-14 ASSESSMENT — PATIENT HEALTH QUESTIONNAIRE - PHQ9
SUM OF ALL RESPONSES TO PHQ QUESTIONS 1-9: 12
2. FEELING DOWN, DEPRESSED OR HOPELESS: 2
SUM OF ALL RESPONSES TO PHQ QUESTIONS 1-9: 12
3. TROUBLE FALLING OR STAYING ASLEEP: 1

## 2021-01-14 ASSESSMENT — COLUMBIA-SUICIDE SEVERITY RATING SCALE - C-SSRS
1. WITHIN THE PAST MONTH, HAVE YOU WISHED YOU WERE DEAD OR WISHED YOU COULD GO TO SLEEP AND NOT WAKE UP?: NO
6. HAVE YOU EVER DONE ANYTHING, STARTED TO DO ANYTHING, OR PREPARED TO DO ANYTHING TO END YOUR LIFE?: NO

## 2021-01-14 NOTE — PROGRESS NOTES
2021    TELEHEALTH EVALUATION -- Audio/Visual (During [employer]-80 public health emergency)    HPI:    Geetha Borja (:  1985) has requested an audio/video evaluation for the following concern(s): Diabetes, Hx of depression and Refill medication  -She is here for video visit specially for refill of medication  -Patient stated that she has had pain in left shoulder and left neck for about a few weeks, level of pain is moderate in intensity localized, aggravated with movement  -Patient has had rash on bilateral distal forearm on extensor surface for a few days, it is red and itchy  -Patient stated that her depression is the same as before, she has been taking Zoloft 50 mg daily,is not suicidal, she thinks that the depression is due to Covid 19 pandemic  things as she could not go outside ,able to meet people. We discussed regarding visiting therapist  -She is known patient with diabetes, latest A1c is more than 11, she has been taking long-acting Basaglar, short-acting insulin on sliding , Victoza  -She stated that she check blood glucose at home fasting level is at 100 and post meal is around 200  -Patient is willing to come to visit in person in 4 weeks  -Her blood pressure taken at home was 123/83  -Today just want to refill the medication    Review of Systems   Constitutional: Negative. Negative for chills and fever. HENT: Negative. Negative for congestion and sore throat. Eyes: Negative. Negative for redness. Respiratory: Negative. Negative for cough and shortness of breath. Cardiovascular: Negative. Gastrointestinal: Negative. Negative for diarrhea, nausea and vomiting. Endocrine: Negative. Genitourinary: Negative. Negative for dysuria, frequency and hematuria. Musculoskeletal: Negative. Skin: Negative. Negative for rash (Rash, b/l distal Fore-arm). Neurological: Negative. Negative for dizziness, syncope and light-headedness. Hematological: Negative. Psychiatric/Behavioral: Negative. Negative for confusion. The patient is not nervous/anxious. Prior to Visit Medications    Medication Sig Taking?  Authorizing Provider   ibuprofen (ADVIL;MOTRIN) 600 MG tablet Take 1 tablet by mouth 4 times daily as needed for Pain Yes Jv Rosenbaum MD   aspirin 81 MG EC tablet Take 1 tablet by mouth daily Yes Janelle Merino MD   cilostazol (PLETAL) 100 MG tablet Take 1 tablet by mouth 2 times daily Yes Janelle Merino MD   insulin glargine (BASAGLAR KWIKPEN) 100 UNIT/ML injection pen Inject 35 Units into the skin nightly Yes Janelle Merino MD   insulin lispro (HUMALOG) 100 UNIT/ML injection vial Take 5 units of Humalog with eat meal  plus If blood sugars are 150-200 -add 1 units of Humalog If blood sugars are 201-250 - add 2 units of Humalog If blood sugars are 251-300 - add 3 units of Humalog If blood sugars are 301-350 - add 4 units of Humalog If  above 350 - add 5 unit of humalog Yes Janelle Merino MD   famotidine (PEPCID) 20 MG tablet Take 1 tablet by mouth 2 times daily Yes Janelle Merino MD   pantoprazole (PROTONIX) 40 MG tablet Take 1 tablet by mouth every morning (before breakfast) Yes Janelle Merino MD   potassium chloride (KLOR-CON M) 10 MEQ extended release tablet Take 1 tablet by mouth daily Yes Janelle Merino MD   sertraline (ZOLOFT) 25 MG tablet Take 2 tablets by mouth daily Yes Janelle Merino MD   ondansetron (ZOFRAN ODT) 4 MG disintegrating tablet Take 1 tablet by mouth every 8 hours as needed for Nausea or Vomiting Yes Laure Escalante PA-C   dicyclomine (BENTYL) 10 MG capsule Take 2 capsules by mouth 4 times daily (before meals and nightly) Yes Laure Escalante PA-C   cetirizine (ZYRTEC ALLERGY) 10 MG tablet Take 1 tablet by mouth daily Yes Joesph Junior DO   Liraglutide (VICTOZA) 18 MG/3ML SOPN SC injection Inject 1.2 mg into the skin daily Take 1.2 mg for 1 week then increase to 1.8 mg thereafter Yes Historical Provider, MD GABAPENTIN PO Take 600 mg by mouth 2 times daily (with meals) Take 800 mg bid and 1600 mg hs Yes Historical Provider, MD       Social History     Tobacco Use    Smoking status: Passive Smoke Exposure - Never Smoker    Smokeless tobacco: Never Used   Substance Use Topics    Alcohol use: No    Drug use: No            PHYSICAL EXAMINATION:  [ INSTRUCTIONS:  \"[x]\" Indicates a positive item  \"[]\" Indicates a negative item  -- DELETE ALL ITEMS NOT EXAMINED]  Vital Signs: (As obtained by patient/caregiver or practitioner observation)    Blood pressure-  Heart rate-    Respiratory rate-    Temperature-  Pulse oximetry-     Constitutional: [x] Appears well-developed and well-nourished [x] No apparent distress      [] Abnormal-   Mental status  [x] Alert and awake  [x] Oriented to person/place/time [x]Able to follow commands      Eyes:  EOM    [x]  Normal  [] Abnormal-  Sclera  [x]  Normal  [] Abnormal -         Discharge [x]  None visible  [] Abnormal -    HENT:   [x] Normocephalic, atraumatic.   [] Abnormal   [x] Mouth/Throat: Mucous membranes are moist.     External Ears [x] Normal  [] Abnormal-     Neck: [x] No visualized mass     Pulmonary/Chest: [x] Respiratory effort normal.  [x] No visualized signs of difficulty breathing or respiratory distress        [] Abnormal-      Musculoskeletal:   [x] Normal gait with no signs of ataxia         [x] Normal range of motion of neck        [] Abnormal-       Neurological:        [x] No Facial Asymmetry (Cranial nerve 7 motor function) (limited exam to video visit)          [x] No gaze palsy        [] Abnormal-         Skin:        [x] No significant exanthematous lesions or discoloration noted on facial skin         [] Abnormal-            Psychiatric:       [x] Normal Affect [x] No Hallucinations        [] Abnormal-     Other pertinent observable physical exam findings-     ASSESSMENT/PLAN: 1. Type 2 diabetes mellitus with other specified complication, with long-term current use of insulin ([facility]  -Known patient with type 2 diabetes on insulin, her latest A1c was more than 11  -We suggested to check blood glucose at least 3 times a day including fasting and put a log  -We have suggested to come in person to check A1c and will be able to adjust medication  -This was a video visit so we can't do anything else except counseling and refill medication    - aspirin 81 MG EC tablet; Take 1 tablet by mouth daily  Dispense: 30 tablet; Refill: 1  - cilostazol (PLETAL) 100 MG tablet; Take 1 tablet by mouth 2 times daily  Dispense: 60 tablet; Refill: 1  - insulin glargine (BASAGLAR KWIKPEN) 100 UNIT/ML injection pen; Inject 40 Units into the skin nightly  Dispense: 5 pen; Refill: 1  - insulin lispro (HUMALOG) 100 UNIT/ML injection vial; Take 10 units of Humalog with eat meal  plus If blood sugars are 150-200 -add 1 units of Humalog If blood sugars are 201-250 - add 2 units of Humalog If blood sugars are 251-300 - add 3 units of Humalog If blood sugars are 301-350 - add 4 units of Humalog If  above 350 - add 5 unit of humalog  Dispense: 1 vial; Refill: 1  - Liraglutide (VICTOZA) 18 MG/3ML SOPN SC injection; Inject 1.2 mg into the skin daily Take 1.2 mg for 1 week then increase to 1.8 mg thereafter  Dispense: 2 pen; Refill: 1    2. Gastroparesis  -She is patient with diabetes with GI symptoms, gastroparesis  -Looks stable in this visit; denied nausea vomiting or dyspepsia  -She takes Pepcid 20 mg 2 times a day  - dicyclomine (BENTYL) 10 MG capsule; Take 2 capsules by mouth 4 times daily (before meals and nightly)  Dispense: 15 capsule; Refill: none  - famotidine (PEPCID) 20 MG tablet; Take 1 tablet by mouth 2 times daily  Dispense: 60 tablet; Refill: 1    3.  Depressive disorder  -She is patient with chronic depression takes Zoloft 50 mg twice a day -No suicidal ideation, but she thinks that her depression is due to COVID-19 pandemic  -We have suggested to continue the medication and physical therapist  -We have suggested to come in person in next visit   -We have refilled medication as per her request in this video visit  - sertraline (ZOLOFT) 25 MG tablet; Take 2 tablets by mouth daily  Dispense: 30 tablet; Refill: 1  - pantoprazole (PROTONIX) 40 MG tablet; Take 1 tablet by mouth every morning (before breakfast)  Dispense: 30 tablet; Refill: 1    4. Neck pain  -Patient has had left-sided neck pain and left shoulder pain  -We cannot evaluate in detail due to the video visit  -We have suggested to take NSAID as needed and come in person so that we can evaluate and take imagine  if needed    5. Hx of Adrenal insufficiency (HCC)  - cilostazol (PLETAL) 100 MG tablet; Take 1 tablet by mouth 2 times daily  Dispense: 60 tablet; Refill: 1  - potassium chloride (KLOR-CON M) 10 MEQ extended release tablet; Take 1 tablet by mouth daily  Dispense: 30 tablet; Refill: 1      Return in about 1 month (around 2/14/2021). Reyna Flowers is a 38 y.o. female being evaluated by a Virtual Visit (video visit) encounter to address concerns as mentioned above. A caregiver was present when appropriate. Due to this being a TeleHealth encounter (During [facility]-07 public health emergency), evaluation of the following organ systems was limited: Vitals/Constitutional/EENT/Resp/CV/GI//MS/Neuro/Skin/Heme-Lymph-Imm. Pursuant to the emergency declaration under the [address] [place] [address] and the [employer] Act, this Virtual Visit was conducted with patient's (and/or legal guardian's) consent, to reduce the patient's risk of exposure to COVID-19 and provide necessary medical care. The patient (and/or legal guardian) has also been advised to contact this office for worsening conditions or problems, and seek emergency medical treatment and/or call 911 if deemed necessary. Patient identification was verified at the start of the visit: yes    Total time spent on this encounter: 27 m    Services were provided through a video synchronous discussion virtually to substitute for in-person clinic visit. Patient and provider were located at their individual homes. --Ascencion Mace MD on 1/14/2021 at 9:04 AM    An electronic signature was used to authenticate this note.

## 2021-01-14 NOTE — PROGRESS NOTES
TeleMedicine Patient Consent    This visit was performed as a virtual video visit using a synchronous, two-way, audio-video telehealth technology platform. Patient identification was verified at the start of the visit, including the patient's telephone number and physical location. I discussed with the patient the nature of our telehealth visits, that:     1. Due to the nature of an audio- video modality, the only components of a physical exam that could be done are the elements supported by direct observation. 2. The provider will evaluate the patient and recommend diagnostics and treatments based on their assessment. 3. If it was felt that the patient should be evaluated in clinic or an emergency room setting, then they would be directed there. 4. Our sessions are not being recorded and that personal health information is protected. 5. Our team would provide follow up care in person if/when the patient needs it. Patient does agree to proceed with telemedicine consultation. Patient location: home address in [facility]    Physician location: regular office location Other people involved in call: Preceptor  This visit was completed virtually using D[employer]. me    This visit was performed during the 0062 public health crisis and COVID-19 pandemic.   *Add GT modifier to all Video Visits*

## 2021-01-14 NOTE — PROGRESS NOTES
Subjective:    Alirio Leong is here for a multitude of issues today. She has DM and it is poorly controlled. She is a virtual visit and the problems were essentially discussed without the benefit of examination. She was encouraged to visit. Her depression is stable. ROS: Otherwise negative    Patient Active Problem List   Diagnosis    Orthostatic hypotension    Urinary tract infection without hematuria    Adrenal insufficiency (HCC)    Insulin dependent type 2 diabetes mellitus, uncontrolled ([address]    Diabetic infection of right foot (HCC)    Hyperglycemia    Gastroparesis    Ischemic toe    Cellulitis of foot, right    Failed skin graft    Skin flap necrosis (HCC)    Depressive disorder       Past medical, surgical, family and social history were reviewed, non-contributory, and unchanged unless otherwise stated. Objective: There were no vitals taken for this visit. Exam is as noted by resident with the following changes, additions or corrections:      Assessment/Plan:        Alirio Leong was seen today for diabetes and depression. Diagnoses and all orders for this visit:    Type 2 diabetes mellitus with other specified complication, with long-term current use of insulin ([facility])  -     aspirin 81 MG EC tablet; Take 1 tablet by mouth daily  -     cilostazol (PLETAL) 100 MG tablet; Take 1 tablet by mouth 2 times daily  -     insulin glargine (BASAGLAR KWIKPEN) 100 UNIT/ML injection pen;  Inject 40 Units into the skin nightly  -     insulin lispro (HUMALOG) 100 UNIT/ML injection vial; Take 10 units of Humalog with eat meal  plus If blood sugars are 150-200 -add 1 units of Humalog If blood sugars are 201-250 - add 2 units of Humalog If blood sugars are 251-300 - add 3 units of Humalog If blood sugars are 301-350 - add 4 units of Humalog If  above 350 - add 5 unit of humalog

## 2021-01-15 NOTE — TELEPHONE ENCOUNTER
Last Appointment   1/14/2021  Next Appointment  2/16/2021    Patient called for refill on Gabapentin.  Med pending drs approval.

## 2021-01-16 RX ORDER — GABAPENTIN 600 MG/1
600 TABLET ORAL 2 TIMES DAILY WITH MEALS
Qty: 60 TABLET | Refills: 0 | Status: SHIPPED
Start: 2021-01-16 | End: 2021-01-19 | Stop reason: DRUGHIGH

## 2021-01-17 ASSESSMENT — ENCOUNTER SYMPTOMS
COUGH: 0
SORE THROAT: 0
SHORTNESS OF BREATH: 0
EYE REDNESS: 0
DIARRHEA: 0
GASTROINTESTINAL NEGATIVE: 1
EYES NEGATIVE: 1
RESPIRATORY NEGATIVE: 1
VOMITING: 0
NAUSEA: 0

## 2021-01-18 ENCOUNTER — TELEPHONE (OUTPATIENT)
Dept: FAMILY MEDICINE CLINIC | Age: 36
End: 2021-01-18

## 2021-01-18 NOTE — TELEPHONE ENCOUNTER
I have asked her for In person Visit. She is suggested to take whatever she has been taking and will readjust after evaluation during in person visit. So,Take 600 mg Twice a day till her in person visit.   Thanks

## 2021-01-18 NOTE — TELEPHONE ENCOUNTER
Last Appointment   1/14/2021  Next Appointment  2/16/2021    [address] called for verification of directions for patients gabapentin. Was sent over with sig of: Take 1 tablet by mouth 2 times daily (with meals) for 30 days.  Take 800 mg bid and 1600 mg hs

## 2021-01-19 RX ORDER — GABAPENTIN 600 MG/1
600 TABLET ORAL 2 TIMES DAILY WITH MEALS
Qty: 60 TABLET | Refills: 0 | Status: SHIPPED | OUTPATIENT
Start: 2021-01-19 | End: 2021-02-25 | Stop reason: SDUPTHER

## 2021-02-05 DIAGNOSIS — Z79.4 TYPE 2 DIABETES MELLITUS WITH OTHER SPECIFIED COMPLICATION, WITH LONG-TERM CURRENT USE OF INSULIN (HCC): Primary | ICD-10-CM

## 2021-02-05 DIAGNOSIS — E11.69 TYPE 2 DIABETES MELLITUS WITH OTHER SPECIFIED COMPLICATION, WITH LONG-TERM CURRENT USE OF INSULIN (HCC): Primary | ICD-10-CM

## 2021-02-05 NOTE — TELEPHONE ENCOUNTER
Last Appointment   1/14/2021  Next Appointment  2/16/2021    Patient called pharmacy, she has not been taking the humalog vial, no way to inject this. She got the refill in January and February and called them today. She told pharmacy she has not been taking this medication, she wants the pens.   - humalog pen pending drs approval.

## 2021-02-07 RX ORDER — INSULIN LISPRO 100 [IU]/ML
INJECTION, SOLUTION INTRAVENOUS; SUBCUTANEOUS
Qty: 10 PEN | Refills: 3 | Status: SHIPPED | OUTPATIENT
Start: 2021-02-07 | End: 2021-02-25 | Stop reason: SDUPTHER

## 2021-02-25 ENCOUNTER — VIRTUAL VISIT (OUTPATIENT)
Dept: FAMILY MEDICINE CLINIC | Age: 36
End: 2021-02-25
Payer: COMMERCIAL

## 2021-02-25 DIAGNOSIS — K31.84 GASTROPARESIS: ICD-10-CM

## 2021-02-25 DIAGNOSIS — E27.40 ADRENAL INSUFFICIENCY (HCC): ICD-10-CM

## 2021-02-25 DIAGNOSIS — F32.A DEPRESSIVE DISORDER: ICD-10-CM

## 2021-02-25 DIAGNOSIS — E11.69 TYPE 2 DIABETES MELLITUS WITH OTHER SPECIFIED COMPLICATION, WITH LONG-TERM CURRENT USE OF INSULIN (HCC): ICD-10-CM

## 2021-02-25 DIAGNOSIS — Z79.4 TYPE 2 DIABETES MELLITUS WITH OTHER SPECIFIED COMPLICATION, WITH LONG-TERM CURRENT USE OF INSULIN (HCC): ICD-10-CM

## 2021-02-25 PROCEDURE — G8427 DOCREV CUR MEDS BY ELIG CLIN: HCPCS | Performed by: STUDENT IN AN ORGANIZED HEALTH CARE EDUCATION/TRAINING PROGRAM

## 2021-02-25 PROCEDURE — 3046F HEMOGLOBIN A1C LEVEL >9.0%: CPT | Performed by: STUDENT IN AN ORGANIZED HEALTH CARE EDUCATION/TRAINING PROGRAM

## 2021-02-25 PROCEDURE — 99213 OFFICE O/P EST LOW 20 MIN: CPT | Performed by: STUDENT IN AN ORGANIZED HEALTH CARE EDUCATION/TRAINING PROGRAM

## 2021-02-25 PROCEDURE — 2022F DILAT RTA XM EVC RTNOPTHY: CPT | Performed by: STUDENT IN AN ORGANIZED HEALTH CARE EDUCATION/TRAINING PROGRAM

## 2021-02-25 RX ORDER — INSULIN GLARGINE 100 [IU]/ML
40 INJECTION, SOLUTION SUBCUTANEOUS NIGHTLY
Qty: 5 PEN | Refills: 1 | Status: SHIPPED
Start: 2021-02-25 | End: 2021-04-01 | Stop reason: SDUPTHER

## 2021-02-25 RX ORDER — GABAPENTIN 600 MG/1
600 TABLET ORAL 2 TIMES DAILY WITH MEALS
Qty: 60 TABLET | Refills: 0 | Status: SHIPPED
Start: 2021-02-25 | End: 2021-04-01 | Stop reason: SDUPTHER

## 2021-02-25 RX ORDER — POTASSIUM CHLORIDE 750 MG/1
10 TABLET, EXTENDED RELEASE ORAL DAILY
Qty: 30 TABLET | Refills: 1 | Status: SHIPPED
Start: 2021-02-25 | End: 2021-04-01 | Stop reason: SDUPTHER

## 2021-02-25 RX ORDER — FAMOTIDINE 20 MG/1
20 TABLET, FILM COATED ORAL 2 TIMES DAILY
Qty: 60 TABLET | Refills: 1 | Status: SHIPPED
Start: 2021-02-25 | End: 2021-04-01 | Stop reason: SDUPTHER

## 2021-02-25 RX ORDER — ASPIRIN 81 MG/1
81 TABLET ORAL DAILY
Qty: 30 TABLET | Refills: 1 | Status: SHIPPED
Start: 2021-02-25 | End: 2021-04-01 | Stop reason: SDUPTHER

## 2021-02-25 RX ORDER — IBUPROFEN 600 MG/1
600 TABLET ORAL 4 TIMES DAILY PRN
Qty: 40 TABLET | Refills: 0 | Status: SHIPPED
Start: 2021-02-25 | End: 2021-04-01 | Stop reason: SDUPTHER

## 2021-02-25 RX ORDER — CILOSTAZOL 100 MG/1
100 TABLET ORAL 2 TIMES DAILY
Qty: 60 TABLET | Refills: 1 | Status: SHIPPED
Start: 2021-02-25 | End: 2021-04-01 | Stop reason: SDUPTHER

## 2021-02-25 RX ORDER — LIRAGLUTIDE 6 MG/ML
1.2 INJECTION SUBCUTANEOUS DAILY
Qty: 2 PEN | Refills: 1 | Status: SHIPPED
Start: 2021-02-25 | End: 2021-04-01 | Stop reason: SDUPTHER

## 2021-02-25 RX ORDER — SENNOSIDES 8.6 MG
650 CAPSULE ORAL EVERY 8 HOURS PRN
Qty: 30 TABLET | Refills: 0 | Status: SHIPPED
Start: 2021-02-25 | End: 2021-04-01 | Stop reason: SDUPTHER

## 2021-02-25 RX ORDER — CETIRIZINE HYDROCHLORIDE 10 MG/1
10 TABLET ORAL DAILY
Qty: 30 TABLET | Refills: 1 | Status: SHIPPED
Start: 2021-02-25 | End: 2021-04-01 | Stop reason: SDUPTHER

## 2021-02-25 RX ORDER — PANTOPRAZOLE SODIUM 40 MG/1
40 TABLET, DELAYED RELEASE ORAL
Qty: 30 TABLET | Refills: 1 | Status: SHIPPED
Start: 2021-02-25 | End: 2021-04-01 | Stop reason: SDUPTHER

## 2021-02-25 RX ORDER — INSULIN LISPRO 100 [IU]/ML
INJECTION, SOLUTION INTRAVENOUS; SUBCUTANEOUS
Qty: 10 PEN | Refills: 3 | Status: SHIPPED | OUTPATIENT
Start: 2021-02-25 | End: 2021-04-01 | Stop reason: SDUPTHER

## 2021-02-25 SDOH — ECONOMIC STABILITY: INCOME INSECURITY: HOW HARD IS IT FOR YOU TO PAY FOR THE VERY BASICS LIKE FOOD, HOUSING, MEDICAL CARE, AND HEATING?: HARD

## 2021-02-25 SDOH — ECONOMIC STABILITY: TRANSPORTATION INSECURITY
IN THE PAST 12 MONTHS, HAS THE LACK OF TRANSPORTATION KEPT YOU FROM MEDICAL APPOINTMENTS OR FROM GETTING MEDICATIONS?: YES

## 2021-02-25 SDOH — ECONOMIC STABILITY: FOOD INSECURITY: WITHIN THE PAST 12 MONTHS, THE FOOD YOU BOUGHT JUST DIDN'T LAST AND YOU DIDN'T HAVE MONEY TO GET MORE.: OFTEN TRUE

## 2021-02-25 SDOH — ECONOMIC STABILITY: TRANSPORTATION INSECURITY
IN THE PAST 12 MONTHS, HAS LACK OF TRANSPORTATION KEPT YOU FROM MEETINGS, WORK, OR FROM GETTING THINGS NEEDED FOR DAILY LIVING?: YES

## 2021-02-25 ASSESSMENT — ENCOUNTER SYMPTOMS
COUGH: 0
GASTROINTESTINAL NEGATIVE: 1
EYES NEGATIVE: 1
RESPIRATORY NEGATIVE: 1
VOMITING: 0
SHORTNESS OF BREATH: 0
EYE REDNESS: 0
ALLERGIC/IMMUNOLOGIC NEGATIVE: 1
SORE THROAT: 0
NAUSEA: 0
CONSTIPATION: 0
CHEST TIGHTNESS: 0

## 2021-02-25 NOTE — PROGRESS NOTES
2021    TELEHEALTH EVALUATION -- Audio/Visual (During ORQXY-71 public health emergency)    HPI:  Stephany Morales (:  1985) has requested an audio/video evaluation for the following concern(s):   concern(s): Follow-up for Diabetes, Hx of depression and Refill medication  -She is on video visit specially for refill of medication  -Patient stated that she has had pain in left shoulder and left neck for past couple of weeks and has been better now with medication we gave her. The pain now is mild and localized. -Patient has had rash on bilateral distal forearm on extensor surface, that has been improved with topical steroid.  -Patient stated that her depression is the same as before, she has been taking Zoloft 50 mg daily,is not suicidal, she thinks that the depression is due to Covid 19 pandemic  things as she could not go outside ,able to meet people. We had discussed regarding visiting therapist, now she is feeling much better in this visit.  -She is known patient with diabetes, latest A1c is more than 11, she has been taking long-acting Basaglar, short-acting insulin on sliding sacle , Victoza  -She stated that her blood sugar is running around 150-300  -She has had history of hypoglycemia 1 time, she knows how to treat  -Patient is willing to come to visit in person in 4 weeks  -Her blood pressure taken at home is normal  -Today just want to refill the medication    Review of Systems   Constitutional: Negative. Negative for chills, fatigue and fever. HENT: Negative. Negative for congestion and sore throat. Eyes: Negative. Negative for redness. Respiratory: Negative. Negative for cough, chest tightness and shortness of breath. Cardiovascular: Negative. Negative for chest pain, palpitations and leg swelling. Gastrointestinal: Negative. Negative for constipation, nausea and vomiting. Endocrine: Negative. Genitourinary: Negative. Negative for dysuria, hematuria and pelvic pain. Musculoskeletal: Negative for arthralgias (Left shoulder pain). Skin: Positive for rash. Allergic/Immunologic: Negative. Neurological: Negative. Negative for dizziness, syncope, weakness and light-headedness. Hematological: Negative. Psychiatric/Behavioral: Positive for decreased concentration and dysphoric mood. Negative for confusion and suicidal ideas. Prior to Visit Medications    Medication Sig Taking? Authorizing Provider   insulin lispro, 1 Unit Dial, (HUMALOG KWIKPEN) 100 UNIT/ML SOPN 10 units tid with meals, also SLIDING SCALE: If blood sugars are 150-200 -add 1 units of Humalog If blood sugars are 201-250 - add 2 units of Humalog If blood sugars are 251-300 - add 3 units of Humalog If blood sugars are 301-350 - add 4 units of Humalog If  above 350 - add 5 unit of humalog Yes Thi Burns MD   gabapentin (NEURONTIN) 600 MG tablet Take 1 tablet by mouth 2 times daily (with meals) for 30 days.  . Yes Thi Burns MD   aspirin 81 MG EC tablet Take 1 tablet by mouth daily Yes Thi Burns MD   cilostazol (PLETAL) 100 MG tablet Take 1 tablet by mouth 2 times daily Yes Thi Burns MD   dicyclomine (BENTYL) 10 MG capsule Take 2 capsules by mouth 4 times daily (before meals and nightly) Yes Thi Burns MD   famotidine (PEPCID) 20 MG tablet Take 1 tablet by mouth 2 times daily Yes Thi Burns MD   insulin glargine (BASAGLAR KWIKPEN) 100 UNIT/ML injection pen Inject 40 Units into the skin nightly Yes Thi Burns MD   Liraglutide (VICTOZA) 18 MG/3ML SOPN SC injection Inject 1.2 mg into the skin daily Take 1.2 mg for 1 week then increase to 1.8 mg thereafter Yes Thi Burns MD   potassium chloride (KLOR-CON M) 10 MEQ extended release tablet Take 1 tablet by mouth daily Yes Thi Burns MD [x] Normal range of motion of neck        [] Abnormal-       Neurological:        [x] No Facial Asymmetry (Cranial nerve 7 motor function) (limited exam to video visit)          [x] No gaze palsy        [] Abnormal-         Skin:        [x] No significant exanthematous lesions or discoloration noted on facial skin         [] Abnormal-            Psychiatric:       [x] Normal Affect [x] No Hallucinations        [] Abnormal-     Other pertinent observable physical exam findings-     ASSESSMENT/PLAN:  1. Type 2 diabetes mellitus with other specified complication, with long-term current use of insulin ([facility])    -She is patient with diabetes on insulin, the last A1c was more than 11  -Next A1c check in 6 weeks  -She is on Lantus 40 nightly, regular insulin 10 units 3 times with meal and Victoza 1.2 daily  -Her blood sugar checked at home runs between 150300  -She has had history of 1 episode of hypoglycemia,  -We have suggested to visit in person for full clinical examination  -We have refilled the medication for this visit.  -We have suggested to check blood glucose at least 3 times a day and put a log  -We have discussed about importance of diet for diabetic patient with insulin  -Patient understands the symptoms of hypoglycemia and how to treat it        - aspirin 81 MG EC tablet; Take 1 tablet by mouth daily  Dispense: 30 tablet; Refill: 1  - cilostazol (PLETAL) 100 MG tablet; Take 1 tablet by mouth 2 times daily  Dispense: 60 tablet; Refill: 1  - insulin glargine (BASAGLAR KWIKPEN) 100 UNIT/ML injection pen;  Inject 40 Units into the skin nightly  Dispense: 5 pen; Refill: 1 - insulin lispro, 1 Unit Dial, (HUMALOG KWIKPEN) 100 UNIT/ML SOPN; 10 units tid with meals, also SLIDING SCALE: If blood sugars are 150-200 -add 1 units of Humalog If blood sugars are 201-250 - add 2 units of Humalog If blood sugars are 251-300 - add 3 units of Humalog If blood sugars are 301-350 - add 4 units of Humalog If  above 350 - add 5 unit of humalog  Dispense: 10 pen; Refill: 3  - Liraglutide (VICTOZA) 18 MG/3ML SOPN SC injection; Inject 1.2 mg into the skin daily Take 1.2 mg for 1 week then increase to 1.8 mg thereafter  Dispense: 2 pen; Refill: 1    2. Adrenal insufficiency (HCC)  -Stable, no concern in this visit  - cilostazol (PLETAL) 100 MG tablet; Take 1 tablet by mouth 2 times daily  Dispense: 60 tablet; Refill: 1  - potassium chloride (KLOR-CON M) 10 MEQ extended release tablet; Take 1 tablet by mouth daily  Dispense: 30 tablet; Refill: 1    3. Gastroparesis  -Stable, no concern in this visit  - famotidine (PEPCID) 20 MG tablet; Take 1 tablet by mouth 2 times daily  Dispense: 60 tablet; Refill: 1    4. Depressive disorder  -Patient admits history of depression, on Zoloft 50 mg daily  -The mood looks stable, denied suicidal ideation, any problem with sleep  -Patient thinks that due to Covid pandemic making her  symptoms worse, but gradually getting better with  starting outing  - pantoprazole (PROTONIX) 40 MG tablet; Take 1 tablet by mouth every morning (before breakfast)  Dispense: 30 tablet; Refill: 1  - sertraline (ZOLOFT) 25 MG tablet; Take 2 tablets by mouth daily  Dispense: 60 tablet;  Refill: 1  Medical Decision Making  Number and Complexity of Problems Addressed:   Stable Chronic Illnesses: Diabetes, chronic pain, history of depression, rash  Level of Problems Addressed:  Low (2+ self-limited or minor problems / 1 stable chronic illness / 1 acute, uncomplicated illness or injury)  Amount and/or Complexity of Data to be Reviewed and Analyzed: Limited (1 of 2 Categories) Category 1 (Any two of the following, can duplicate): External Note from Unique Source, Review Results of Unique Test, Order Unique Test / Category 2 (Assessment Requiring Independent Historian(s))  Risk of Complications and/or Morbidity or Mortality of Patient Management:   Low risk of morbidity from additional diagnostic testing or treatment  Today's visit has a medical decision making level of Low. Return in about 1 month (around 3/25/2021) for DM, Depression . Cam Has is a 38 y.o. female being evaluated by a Virtual Visit (video visit) encounter to address concerns as mentioned above. A caregiver was present when appropriate. Due to this being a TeleHealth encounter (During KJQ-03 public health emergency), evaluation of the following organ systems was limited: Vitals/Constitutional/EENT/Resp/CV/GI//MS/Neuro/Skin/Heme-Lymph-Imm. Pursuant to the emergency declaration under the [address] [place] [address] authority and the [employer] Act, this Virtual Visit was conducted with patient's (and/or legal guardian's) consent, to reduce the patient's risk of exposure to COVID-19 and provide necessary medical care. The patient (and/or legal guardian) has also been advised to contact this office for worsening conditions or problems, and seek emergency medical treatment and/or call 911 if deemed necessary. Patient identification was verified at the start of the visit: Yes    Total time spent on this encounter: 27 m    Services were provided through a video synchronous discussion virtually to substitute for in-person clinic visit. Patient and provider were located at their individual homes. --Pascual Forbes MD on 2/25/2021 at 12:39 PM    An electronic signature was used to authenticate this note.

## 2021-02-25 NOTE — PROGRESS NOTES
I spoke with patient about scheduling her follow up appointment in the office. That she needs to come in so that we can check her blood sugar and HGA1C for management of diabetes. Patient will call [facility] to set up transportation to the office. Appointment scheduled.

## 2021-02-25 NOTE — PROGRESS NOTES
[facility]    Subjective: This Telehealth visit was performed as two-way, audio-video technology platform. Verbal consent was taken from patient as noted in medical assistant's/nurse's note. DM2 - A1C elevated -300 - taking meds, 1 episode of hypoglycemia ~70 corrected herself. Depression - stable for now    Objective:  As noted in resident's note. Assessment/Plan:  DM2 - will need insulin/glucose/meal logs - suspect variations in diet causing fluctuations. Depression - stable - will cont meds for now. Attending Physician Statement  I have reviewed the chart, including any radiology or labs. I have discussed the case, including pertinent history with the resident. I agree with the assessment, plan and orders as documented by the resident. Please refer to the resident note for additional information.     Electronically signed by Gelacio Richardson MD on 2/25/21 at 4:19 PM EST

## 2021-04-01 ENCOUNTER — OFFICE VISIT (OUTPATIENT)
Dept: FAMILY MEDICINE CLINIC | Age: 36
End: 2021-04-01
Payer: COMMERCIAL

## 2021-04-01 VITALS
TEMPERATURE: 97.5 F | HEIGHT: 62 IN | BODY MASS INDEX: 40.3 KG/M2 | OXYGEN SATURATION: 98 % | SYSTOLIC BLOOD PRESSURE: 83 MMHG | WEIGHT: 219 LBS | DIASTOLIC BLOOD PRESSURE: 61 MMHG | HEART RATE: 98 BPM | RESPIRATION RATE: 16 BRPM

## 2021-04-01 DIAGNOSIS — F32.A DEPRESSIVE DISORDER: ICD-10-CM

## 2021-04-01 DIAGNOSIS — Z79.4 TYPE 2 DIABETES MELLITUS WITH OTHER SPECIFIED COMPLICATION, WITH LONG-TERM CURRENT USE OF INSULIN (HCC): Primary | ICD-10-CM

## 2021-04-01 DIAGNOSIS — E11.69 TYPE 2 DIABETES MELLITUS WITH OTHER SPECIFIED COMPLICATION, WITH LONG-TERM CURRENT USE OF INSULIN (HCC): ICD-10-CM

## 2021-04-01 DIAGNOSIS — I73.9 PERIPHERAL VASCULAR DISEASE (HCC): ICD-10-CM

## 2021-04-01 DIAGNOSIS — K31.84 GASTROPARESIS: ICD-10-CM

## 2021-04-01 DIAGNOSIS — E27.40 ADRENAL INSUFFICIENCY (HCC): ICD-10-CM

## 2021-04-01 DIAGNOSIS — E11.69 TYPE 2 DIABETES MELLITUS WITH OTHER SPECIFIED COMPLICATION, WITH LONG-TERM CURRENT USE OF INSULIN (HCC): Primary | ICD-10-CM

## 2021-04-01 DIAGNOSIS — Z79.4 TYPE 2 DIABETES MELLITUS WITH OTHER SPECIFIED COMPLICATION, WITH LONG-TERM CURRENT USE OF INSULIN (HCC): ICD-10-CM

## 2021-04-01 LAB
CHP ED QC CHECK: NORMAL
CREATININE URINE: 146 MG/DL (ref 29–226)
GLUCOSE BLD-MCNC: 187 MG/DL
HBA1C MFR BLD: 10.7 %
MICROALBUMIN UR-MCNC: <12 MG/L
MICROALBUMIN/CREAT UR-RTO: ABNORMAL (ref 0–30)

## 2021-04-01 PROCEDURE — G8417 CALC BMI ABV UP PARAM F/U: HCPCS | Performed by: STUDENT IN AN ORGANIZED HEALTH CARE EDUCATION/TRAINING PROGRAM

## 2021-04-01 PROCEDURE — 1036F TOBACCO NON-USER: CPT | Performed by: STUDENT IN AN ORGANIZED HEALTH CARE EDUCATION/TRAINING PROGRAM

## 2021-04-01 PROCEDURE — 3046F HEMOGLOBIN A1C LEVEL >9.0%: CPT | Performed by: STUDENT IN AN ORGANIZED HEALTH CARE EDUCATION/TRAINING PROGRAM

## 2021-04-01 PROCEDURE — 2022F DILAT RTA XM EVC RTNOPTHY: CPT | Performed by: STUDENT IN AN ORGANIZED HEALTH CARE EDUCATION/TRAINING PROGRAM

## 2021-04-01 PROCEDURE — 82962 GLUCOSE BLOOD TEST: CPT | Performed by: STUDENT IN AN ORGANIZED HEALTH CARE EDUCATION/TRAINING PROGRAM

## 2021-04-01 PROCEDURE — 83036 HEMOGLOBIN GLYCOSYLATED A1C: CPT | Performed by: STUDENT IN AN ORGANIZED HEALTH CARE EDUCATION/TRAINING PROGRAM

## 2021-04-01 PROCEDURE — G8427 DOCREV CUR MEDS BY ELIG CLIN: HCPCS | Performed by: STUDENT IN AN ORGANIZED HEALTH CARE EDUCATION/TRAINING PROGRAM

## 2021-04-01 PROCEDURE — 99213 OFFICE O/P EST LOW 20 MIN: CPT | Performed by: STUDENT IN AN ORGANIZED HEALTH CARE EDUCATION/TRAINING PROGRAM

## 2021-04-01 RX ORDER — FAMOTIDINE 20 MG/1
20 TABLET, FILM COATED ORAL 2 TIMES DAILY
Qty: 60 TABLET | Refills: 3 | Status: SHIPPED | OUTPATIENT
Start: 2021-04-01 | End: 2021-09-29 | Stop reason: SDUPTHER

## 2021-04-01 RX ORDER — IBUPROFEN 600 MG/1
600 TABLET ORAL 4 TIMES DAILY PRN
Qty: 40 TABLET | Refills: 0 | Status: SHIPPED | OUTPATIENT
Start: 2021-04-01 | End: 2021-09-29 | Stop reason: SDUPTHER

## 2021-04-01 RX ORDER — LIRAGLUTIDE 6 MG/ML
1.2 INJECTION SUBCUTANEOUS DAILY
Qty: 2 PEN | Refills: 3 | Status: SHIPPED | OUTPATIENT
Start: 2021-04-01 | End: 2021-08-09 | Stop reason: SDUPTHER

## 2021-04-01 RX ORDER — INSULIN LISPRO 100 [IU]/ML
INJECTION, SOLUTION INTRAVENOUS; SUBCUTANEOUS
Qty: 10 PEN | Refills: 3 | Status: SHIPPED | OUTPATIENT
Start: 2021-04-01 | End: 2021-08-09 | Stop reason: SDUPTHER

## 2021-04-01 RX ORDER — CETIRIZINE HYDROCHLORIDE 10 MG/1
10 TABLET ORAL DAILY
Qty: 30 TABLET | Refills: 3 | Status: SHIPPED | OUTPATIENT
Start: 2021-04-01 | End: 2021-09-23 | Stop reason: SDUPTHER

## 2021-04-01 RX ORDER — ASPIRIN 81 MG/1
81 TABLET ORAL DAILY
Qty: 30 TABLET | Refills: 3 | Status: SHIPPED | OUTPATIENT
Start: 2021-04-01 | End: 2021-09-23 | Stop reason: SDUPTHER

## 2021-04-01 RX ORDER — INSULIN GLARGINE 100 [IU]/ML
40 INJECTION, SOLUTION SUBCUTANEOUS NIGHTLY
Qty: 5 PEN | Refills: 3 | Status: SHIPPED | OUTPATIENT
Start: 2021-04-01 | End: 2021-08-09 | Stop reason: SDUPTHER

## 2021-04-01 RX ORDER — CILOSTAZOL 100 MG/1
100 TABLET ORAL 2 TIMES DAILY
Qty: 60 TABLET | Refills: 3 | Status: SHIPPED | OUTPATIENT
Start: 2021-04-01 | End: 2021-07-22 | Stop reason: SINTOL

## 2021-04-01 RX ORDER — GABAPENTIN 600 MG/1
600 TABLET ORAL 2 TIMES DAILY WITH MEALS
Qty: 60 TABLET | Refills: 0 | Status: SHIPPED
Start: 2021-04-01 | End: 2021-05-06 | Stop reason: SDUPTHER

## 2021-04-01 RX ORDER — SENNOSIDES 8.6 MG
650 CAPSULE ORAL EVERY 8 HOURS PRN
Qty: 30 TABLET | Refills: 0 | Status: SHIPPED | OUTPATIENT
Start: 2021-04-01 | End: 2021-09-29 | Stop reason: SDUPTHER

## 2021-04-01 RX ORDER — POTASSIUM CHLORIDE 750 MG/1
10 TABLET, EXTENDED RELEASE ORAL DAILY
Qty: 30 TABLET | Refills: 3 | Status: SHIPPED | OUTPATIENT
Start: 2021-04-01 | End: 2021-09-23 | Stop reason: SDUPTHER

## 2021-04-01 RX ORDER — PANTOPRAZOLE SODIUM 40 MG/1
40 TABLET, DELAYED RELEASE ORAL
Qty: 30 TABLET | Refills: 3 | Status: SHIPPED | OUTPATIENT
Start: 2021-04-01 | End: 2021-09-23 | Stop reason: SDUPTHER

## 2021-04-01 NOTE — PROGRESS NOTES
vomiting. Endocrine: Negative. Genitourinary: Negative for dysuria, frequency and hematuria. Musculoskeletal: Positive for arthralgias and back pain. Skin: Negative for rash. Allergic/Immunologic: Negative. Neurological: Negative for dizziness, syncope and light-headedness. Hematological: Negative. Psychiatric/Behavioral: Positive for sleep disturbance. Negative for behavioral problems, confusion and suicidal ideas. The patient is nervous/anxious.         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       Past Surgical History:        Procedure Laterality Date     SECTION      CHOLECYSTECTOMY      FOOT DEBRIDEMENT Right 2020    RIGHT 2ND TOE  INCISION AND DRAINAGE performed by Kala Andrade DPM at [address] [place] [address] Right 7/10/2020    RIGHT FOOT INCISION AND DRAINAGE WITH AMPUTATION 3RD TOE, DELAY PRIMARY CLOSURE performed by Tirso Skinner DPM at [facility]       Allergies:    Rocephin [ceftriaxone]    Social History:   Social History     Socioeconomic History    Marital status:      Spouse name: Not on file    Number of children: Not on file    Years of education: Not on file    Highest education level: Not on file   Occupational History    Not on file   Social Needs    Financial resource strain: Hard    Food insecurity     Worry: Often true     Inability: Often true    Transportation needs     Medical: Yes     Non-medical: Yes   Tobacco Use    Smoking status: Passive Smoke Exposure - Never Smoker    Smokeless tobacco: Never Used   Substance and Sexual Activity    Alcohol use: No    Drug use: No    Sexual activity: Not Currently     Partners: Male   Lifestyle    Physical activity     Days per week: Not on file     Minutes per session: Not on file    Stress: Not on for diabetes. Diagnoses and all orders for this visit:    Type 2 diabetes mellitus with other specified complication, with long-term current use of insulin ([facility])  -     POCT glycosylated hemoglobin (Hb A1C)  -     POCT Glucose  -She is known patient with type 2 diabetes on insulin  -She takes Lantus 40 at night, Victoza and regular insulin 3 times a day  -Her A1c checked today was 10.7 better than past, it was 11.1 in the past  -She has a few hypoglycemic episodes, especially in the morning, sometimes it comes down to below 80  -We have suggested to take 35 units of Lantus at nighttime and continue the same regular insulin and Victoza  -She will follow-up with log report  -We have ordered lipid panel, CMP and microalbumin    Depressive disorder  -Patient admits mood symptoms, no suicidal, already on Zoloft 50, tolerating very well  -She want to increase the dosage, we have increased the dose of Zoloft 75 mg daily  -We have suggested to follow-up with counselor too    Hx of peripheral vascular disease  -She is already on Lipitor and cilostazol  -We have ordered KEI     adrenal insufficiency ([address]    Gastroparesis      Medical Decision Making  Number and Complexity of Problems Addressed:   Stable Chronic Illnesses: uncontrolled DM, depression, Chronic pain, and Chronic Illnesses with Exacerbation, Progression, or Side Effect of Treatment: diabetes, depression  Level of Problems Addressed: Moderate (1+ chronic illness with exacerbation, progression, or side effects of treatment / 2+ stable chronic illnesses / 1 undiagnosed new problem with uncertain prognosis / 1 acute illness with systemic symptoms / 1 acute complicated injury)  Amount and/or Complexity of Data to be Reviewed and Analyzed:     Moderate (1 of 3 Categories) Category 1 (Any three of the following, can duplicate except historian) Same as Limited Category 1 plus Assessment Requiring [address] [place] [address] / Category 2 Independent Interpretation of Tests (not separately reported) / Category 3 Discussion of Management or Test Interpretation with External Physician  Risk of Complications and/or Morbidity or Mortality of Patient Management:   Moderate risk of morbidity from additional diagnostic testing or treatment (ex. prescription drug management / minor surgery with risk factors / elective major surgery without risk factors / diagnosis or treatment significantly limited by social determinants of health)  Today's visit has a medical decision making level of Moderate. I encourage further reading and education about your health conditions. Information on many healthconditions is provided by the American Academy of Family Physicians: https://familydoctor. org/  Please bring any questions to me at your next visit. Return to Office: No follow-ups on file. Medication List:    Current Outpatient Medications   Medication Sig Dispense Refill    acetaminophen (TYLENOL) 650 MG extended release tablet Take 1 tablet by mouth every 8 hours as needed for Pain 30 tablet 0    aspirin 81 MG EC tablet Take 1 tablet by mouth daily 30 tablet 1    cetirizine (ZYRTEC ALLERGY) 10 MG tablet Take 1 tablet by mouth daily 30 tablet 1    cilostazol (PLETAL) 100 MG tablet Take 1 tablet by mouth 2 times daily 60 tablet 1    famotidine (PEPCID) 20 MG tablet Take 1 tablet by mouth 2 times daily 60 tablet 1    gabapentin (NEURONTIN) 600 MG tablet Take 1 tablet by mouth 2 times daily (with meals) for 30 days.  . 60 tablet 0    ibuprofen (ADVIL;MOTRIN) 600 MG tablet Take 1 tablet by mouth 4 times daily as needed for Pain 40 tablet 0    insulin glargine (BASAGLAR KWIKPEN) 100 UNIT/ML injection pen Inject 40 Units into the skin nightly 5 pen 1    insulin lispro, 1 Unit Dial, (HUMALOG KWIKPEN) 100 UNIT/ML SOPN 10 units tid with meals, also SLIDING SCALE: If blood sugars are 150-200 -add 1 units of Humalog If blood sugars are 201-250 - add 2 units of Humalog If blood sugars are 251-300 - add 3 units of Humalog If blood sugars are 301-350 - add 4 units of Humalog If  above 350 - add 5 unit of humalog 10 pen 3    Liraglutide (VICTOZA) 18 MG/3ML SOPN SC injection Inject 1.2 mg into the skin daily Take 1.2 mg for 1 week then increase to 1.8 mg thereafter 2 pen 1    pantoprazole (PROTONIX) 40 MG tablet Take 1 tablet by mouth every morning (before breakfast) 30 tablet 1    potassium chloride (KLOR-CON M) 10 MEQ extended release tablet Take 1 tablet by mouth daily 30 tablet 1    sertraline (ZOLOFT) 50 MG tablet Take 1 tablet by mouth daily 90 tablet 2    dicyclomine (BENTYL) 10 MG capsule Take 2 capsules by mouth 4 times daily (before meals and nightly) 15 capsule none    ondansetron (ZOFRAN ODT) 4 MG disintegrating tablet Take 1 tablet by mouth every 8 hours as needed for Nausea or Vomiting 24 tablet 0     No current facility-administered medications for this visit. Carni Lazaro MD       This document may have been prepared at least partiallythrough the use of voice recognition software. Although effort is taken to assure the accuracy of this document, it is possible that grammatical, syntax,  or spelling errors may occur.

## 2021-04-01 NOTE — PROGRESS NOTES
Subjective:    Bronson Burkitt is here for diabetes management. She is on insulin and oral agents yet her A1c is still above 10. She is overweight which is a compounding factor. She suffers anxiety from some life stressors. She is already on Zoloft. 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 Objective:    BP 83/61   Pulse 98   Temp 97.5 °F (36.4 °C) (Temporal)   Resp 16   Ht 5' 2\" (1.575 m)   Wt 219 lb (99.3 kg)   SpO2 98%   Breastfeeding No   BMI 40.06 kg/m²     Exam is as noted by resident with the following changes, additions or corrections:      Assessment/Plan:        Bronson Burkitt was seen today for diabetes. Diagnoses and all orders for this visit:    Type 2 diabetes mellitus with other specified complication, with long-term current use of insulin ([facility])  -     POCT glycosylated hemoglobin (Hb A1C)  -     POCT Glucose  -     aspirin 81 MG EC tablet; Take 1 tablet by mouth daily  -     cilostazol (PLETAL) 100 MG tablet; Take 1 tablet by mouth 2 times daily  -     gabapentin (NEURONTIN) 600 MG tablet; Take 1 tablet by mouth 2 times daily (with meals) for 30 days. .  -     insulin glargine (BASAGLAR KWIKPEN) 100 UNIT/ML injection pen;  Inject 40 Units into the skin nightly  -     insulin lispro, 1 Unit Dial, (HUMALOG KWIKPEN) 100 UNIT/ML SOPN; 10 units tid with meals, also SLIDING SCALE: If blood sugars are 150-200 -add 1 units of Humalog If blood sugars are 201-250 - add 2 units of

## 2021-04-02 ASSESSMENT — ENCOUNTER SYMPTOMS
DIARRHEA: 0
ALLERGIC/IMMUNOLOGIC NEGATIVE: 1
CHOKING: 0
SORE THROAT: 0
BACK PAIN: 1
GASTROINTESTINAL NEGATIVE: 1
EYE REDNESS: 0
SHORTNESS OF BREATH: 0
COUGH: 0
NAUSEA: 0
VOMITING: 0

## 2021-04-12 ENCOUNTER — TELEPHONE (OUTPATIENT)
Dept: FAMILY MEDICINE CLINIC | Age: 36
End: 2021-04-12

## 2021-04-12 DIAGNOSIS — I73.9 PERIPHERAL VASCULAR DISEASE, UNSPECIFIED (HCC): Primary | ICD-10-CM

## 2021-04-12 NOTE — TELEPHONE ENCOUNTER
Vascular dept called to verify an order.  P: [phone]  - if dr was vascular KEI order needs changed to [id number]    Order pending drs approval.

## 2021-04-13 ENCOUNTER — HOSPITAL ENCOUNTER (OUTPATIENT)
Dept: INTERVENTIONAL RADIOLOGY/VASCULAR | Age: 36
Discharge: HOME | End: 2021-04-15
Payer: COMMERCIAL

## 2021-04-13 ENCOUNTER — HOSPITAL ENCOUNTER (OUTPATIENT)
Age: 36
Discharge: HOME | End: 2021-04-13
Payer: COMMERCIAL

## 2021-04-13 DIAGNOSIS — I73.9 PERIPHERAL VASCULAR DISEASE, UNSPECIFIED (HCC): ICD-10-CM

## 2021-04-13 DIAGNOSIS — Z79.4 TYPE 2 DIABETES MELLITUS WITH OTHER SPECIFIED COMPLICATION, WITH LONG-TERM CURRENT USE OF INSULIN (HCC): ICD-10-CM

## 2021-04-13 DIAGNOSIS — E11.69 TYPE 2 DIABETES MELLITUS WITH OTHER SPECIFIED COMPLICATION, WITH LONG-TERM CURRENT USE OF INSULIN (HCC): ICD-10-CM

## 2021-04-13 DIAGNOSIS — I73.9 PERIPHERAL VASCULAR DISEASE (HCC): ICD-10-CM

## 2021-04-13 LAB
ALBUMIN SERPL-MCNC: 3.4 G/DL (ref 3.5–5.2)
ALP BLD-CCNC: 124 U/L (ref 35–104)
ALT SERPL-CCNC: 30 U/L (ref 0–32)
ANION GAP SERPL CALCULATED.3IONS-SCNC: 11 MMOL/L (ref 7–16)
AST SERPL-CCNC: 47 U/L (ref 0–31)
BILIRUB SERPL-MCNC: 0.6 MG/DL (ref 0–1.2)
BUN BLDV-MCNC: 10 MG/DL (ref 6–20)
CALCIUM SERPL-MCNC: 9.1 MG/DL (ref 8.6–10.2)
CHLORIDE BLD-SCNC: 97 MMOL/L (ref 98–107)
CHOLESTEROL, TOTAL: 177 MG/DL (ref 0–199)
CO2: 26 MMOL/L (ref 22–29)
CREAT SERPL-MCNC: 1 MG/DL (ref 0.5–1)
GFR AFRICAN AMERICAN: >60
GFR NON-AFRICAN AMERICAN: >60 ML/MIN/1.73
GLUCOSE BLD-MCNC: 304 MG/DL (ref 74–99)
HDLC SERPL-MCNC: 36 MG/DL
LDL CHOLESTEROL CALCULATED: 116 MG/DL (ref 0–99)
POTASSIUM SERPL-SCNC: 3.8 MMOL/L (ref 3.5–5)
SODIUM BLD-SCNC: 134 MMOL/L (ref 132–146)
TOTAL PROTEIN: 7.4 G/DL (ref 6.4–8.3)
TRIGL SERPL-MCNC: 126 MG/DL (ref 0–149)
VLDLC SERPL CALC-MCNC: 25 MG/DL

## 2021-04-13 PROCEDURE — 80061 LIPID PANEL: CPT

## 2021-04-13 PROCEDURE — 80053 COMPREHEN METABOLIC PANEL: CPT

## 2021-04-13 PROCEDURE — 36415 COLL VENOUS BLD VENIPUNCTURE: CPT

## 2021-04-13 PROCEDURE — 93922 UPR/L XTREMITY ART 2 LEVELS: CPT

## 2021-05-05 DIAGNOSIS — Z79.4 TYPE 2 DIABETES MELLITUS WITH OTHER SPECIFIED COMPLICATION, WITH LONG-TERM CURRENT USE OF INSULIN (HCC): ICD-10-CM

## 2021-05-05 DIAGNOSIS — E11.69 TYPE 2 DIABETES MELLITUS WITH OTHER SPECIFIED COMPLICATION, WITH LONG-TERM CURRENT USE OF INSULIN (HCC): ICD-10-CM

## 2021-05-05 NOTE — TELEPHONE ENCOUNTER
Patient has not left 4 messages, she left 2 and the first one she didn't leave her information the second one, she spoke with me and I sent a message to Dr Ginny Osorio. I advised patient this morning when I spoke to her that she cannot wait until she is out of medication to call because it's typically 24-48 business hours for meds to be refilled. I told her to check with her pharmacy in 24-48 hours, However, I did tell her that I would put as urgent request but advised that it still may not be done until the end of the day.

## 2021-05-05 NOTE — TELEPHONE ENCOUNTER
Pt got disconnected, so she called back. Said she's left 4 messages since Monday and has been without her meds for 3 days now. Again, she stated she was in so much pain. Wanted to leave another message and explained that leaving multiple messages will just leave the office more calls to get thru to. Advised pt to allow more time and to call when she is down to one week's worth of meds so she doesn't miss any days.

## 2021-05-06 RX ORDER — GABAPENTIN 600 MG/1
600 TABLET ORAL 2 TIMES DAILY WITH MEALS
Qty: 60 TABLET | Refills: 0 | Status: SHIPPED
Start: 2021-05-06 | End: 2021-06-18

## 2021-05-07 ENCOUNTER — VIRTUAL VISIT (OUTPATIENT)
Dept: ENDOCRINOLOGY | Age: 36
End: 2021-05-07
Payer: COMMERCIAL

## 2021-05-07 DIAGNOSIS — Z91.119 DIETARY NONCOMPLIANCE: ICD-10-CM

## 2021-05-07 DIAGNOSIS — E55.9 VITAMIN D DEFICIENCY: ICD-10-CM

## 2021-05-07 DIAGNOSIS — E66.01 MORBIDLY OBESE (HCC): ICD-10-CM

## 2021-05-07 DIAGNOSIS — E10.65 TYPE 1 DIABETES MELLITUS WITH HYPERGLYCEMIA (HCC): Primary | ICD-10-CM

## 2021-05-07 PROCEDURE — 99214 OFFICE O/P EST MOD 30 MIN: CPT | Performed by: INTERNAL MEDICINE

## 2021-05-07 PROCEDURE — 3046F HEMOGLOBIN A1C LEVEL >9.0%: CPT | Performed by: INTERNAL MEDICINE

## 2021-05-07 PROCEDURE — G8417 CALC BMI ABV UP PARAM F/U: HCPCS | Performed by: INTERNAL MEDICINE

## 2021-05-07 PROCEDURE — 1036F TOBACCO NON-USER: CPT | Performed by: INTERNAL MEDICINE

## 2021-05-07 PROCEDURE — 2022F DILAT RTA XM EVC RTNOPTHY: CPT | Performed by: INTERNAL MEDICINE

## 2021-05-07 PROCEDURE — G8427 DOCREV CUR MEDS BY ELIG CLIN: HCPCS | Performed by: INTERNAL MEDICINE

## 2021-05-07 NOTE — PROGRESS NOTES
[address] [place] [address] Department of Endocrinology Diabetes and Metabolism   [address] [facility], [address] [place] [address]   Phone: [phone]  Fax: [phone]    Date of Service: 2021  Primary Care Physician: Payal Rose MD  Referring physician: Elvira Garay MD  Provider: Lord Corporal BUSTOS     Reason for the visit:  DM type 2     History of Present Illness: The history is provided by the patient. No  was used. Accuracy of the patient data is excellent.   Lela Pennington is a very pleasant 38 y.o. female seen today for diabetes management     Lela Pennington was diagnosed with diabetes at age 11  and currently on Basgalar 40 units nightly, 10 unistw with emals, victoza 1.8 mg daily  The patient has been checking blood sugar 4 times a day and readings are highly variable   Most recent A1c results summarized below  Lab Results   Component Value Date    LABA1C 10.7 2021    LABA1C 11.1 10/09/2020    LABA1C 10.1 2020     Patient reported hypoglycemic episodes   The patient hasn't been mindful of what has been eating and wasn't following diabetes diet    I reviewed current medications and the patient has no issues with diabetes medications  Lela Pennington is up to date with eye exam and denied any history of diabetic retinopathy   The patient seeing podiatrist  performs  own feet care  Microvascular complications:  No Retinopathy, Nephropathy or Neuropathy   Macrovascular complications: no CAD, PVD, or Stroke    PAST MEDICAL HISTORY   Past Medical History:   Diagnosis Date    Adrenal abnormality ([address]     Cellulitis of foot, right 2020    Diabetes mellitus ([address]     Failed skin graft 2020    Ischemic toe 2020    Skin flap necrosis ([address] 2020       PAST SURGICAL HISTORY   Past Surgical History:   Procedure Laterality Date     SECTION      CHOLECYSTECTOMY      FOOT DEBRIDEMENT Right 2020    RIGHT 60 tablet 3    ibuprofen (ADVIL;MOTRIN) 600 MG tablet Take 1 tablet by mouth 4 times daily as needed for Pain 40 tablet 0    pantoprazole (PROTONIX) 40 MG tablet Take 1 tablet by mouth every morning (before breakfast) 30 tablet 3    potassium chloride (KLOR-CON M) 10 MEQ extended release tablet Take 1 tablet by mouth daily 30 tablet 3    sertraline (ZOLOFT) 50 MG tablet Take 1 tablet by mouth daily 90 tablet 2    dicyclomine (BENTYL) 10 MG capsule Take 2 capsules by mouth 4 times daily (before meals and nightly) 15 capsule none    ondansetron (ZOFRAN ODT) 4 MG disintegrating tablet Take 1 tablet by mouth every 8 hours as needed for Nausea or Vomiting 24 tablet 0     No current facility-administered medications for this visit. Review of Systems  Constitutional: No fever, no chills, no diaphoresis, no generalized weakness. HEENT: No blurred vision, No sore throat, no ear pain, no hair loss  Neck: denied any neck swelling, difficulty swallowing,   Cardio-pulmonary: No CP, SOB or palpitation, No orthopnea or PND. No cough or wheezing. GI: No N/V/D, no constipation, No abdominal pain, no melena or hematochezia   : Denied any dysuria, hematuria, flank pain, discharge, or incontinence. Skin: denied any rash, ulcer, Hirsute, or hyperpigmentation. MSK: denied any joint deformity, joint pain/swelling, muscle pain, or back pain. Neuro: no numbness, no tingling, no weakness, _    OBJECTIVE    There were no vitals taken for this visit. BP Readings from Last 4 Encounters:   04/01/21 83/61   10/22/20 102/60   10/20/20 102/60   10/13/20 90/60     Wt Readings from Last 6 Encounters:   04/01/21 219 lb (99.3 kg)   10/22/20 236 lb (107 kg)   10/13/20 225 lb (102.1 kg)   10/09/20 236 lb (107 kg)   07/27/20 250 lb (113.4 kg)   07/20/20 250 lb (113.4 kg)       Physical examination:  General: awake alert, oriented x3, no abnormal position or movements.   HEENT: normocephalic non-traumatic, no exophthalmos   Neck: supple, no LN enlargement, no thyromegaly, no thyroid tenderness, no JVD. Pulm: Clear equal air entry no added sounds, no wheezing or rhonchi    CVS: S1 + S2, no murmur, no heave. Dorsalis pedis pulse palpable   Abd: soft lax, no tenderness, no organomegaly, audible bowel sounds. Skin: warm, no lesions, no rash.  No callus, no Ulcers, No acanthosis nigricans  Musculoskeletal: No back tenderness, no kyphosis/scoliosis    Neuro: CN intact, Monofilament sensation decreased bilateral , muscle power normal  Psych: normal mood, and affect      Review of Laboratory Data:  I personally reviewed the following lab:  Lab Results   Component Value Date/Time    WBC 5.8 10/22/2020 08:38 PM    RBC 4.52 10/22/2020 08:38 PM    HGB 11.8 10/22/2020 08:38 PM    HCT 36.1 10/22/2020 08:38 PM    MCV 79.9 (L) 10/22/2020 08:38 PM    MCH 26.1 10/22/2020 08:38 PM    MCHC 32.7 10/22/2020 08:38 PM    RDW 14.2 10/22/2020 08:38 PM     10/22/2020 08:38 PM    MPV 11.2 10/22/2020 08:38 PM      Lab Results   Component Value Date/Time     04/13/2021 11:58 AM    K 3.8 04/13/2021 11:58 AM    K 3.9 10/22/2020 08:38 PM    CO2 26 04/13/2021 11:58 AM    BUN 10 04/13/2021 11:58 AM    CREATININE 1.0 04/13/2021 11:58 AM    CALCIUM 9.1 04/13/2021 11:58 AM    LABGLOM >60 04/13/2021 11:58 AM    GFRAA >60 04/13/2021 11:58 AM      Lab Results   Component Value Date/Time    TSH 2.770 10/22/2020 08:38 PM     Lab Results   Component Value Date    LABA1C 10.7 04/01/2021    GLUCOSE 304 04/13/2021    MALBCR - 04/01/2021    LABMICR <12.0 04/01/2021    LABCREA 146 04/01/2021     Lab Results   Component Value Date    LABA1C 10.7 04/01/2021    LABA1C 11.1 10/09/2020    LABA1C 10.1 07/05/2020     Lab Results   Component Value Date    TRIG 126 04/13/2021    HDL 36 04/13/2021    LDLCALC 116 04/13/2021    CHOL 177 04/13/2021     No results found for: VITD25    ASSESSMENT & RECOMMENDATIONS   Meena Still, a 38 y.o.-old female seen in for the following issues     Diabetes Mellitus Type 1     · Patient's diabetes is uncontrol   · Will change DM regimen to Basgalar 44 units nightly, 12 unistw with emals, victoza 1.8 mg daily  · The patient was advised to check blood sugars 4 times a day before meals and at bedtime and send BS readings to our office in a week. · Discussed with patient A1c and blood sugar goals   · Patient will need routine diabetes maintenance and prevention  · Diabetes labs before next visit     Dietary noncompliance   Discussed with patient the importance of eating consistent carbohydrate meals, avoiding high glycemic index food. Also, discussed with patient the risk and negative consequences of dietary noncompliance on blood glucose control, blood pressure and weight    Obesity   Discussed lifestyle changes including diet and exercise with patient in depth. Also discussed with patient cardiovascular risk associated with obesity    I personally reviewed external notes from PCP and other patient's care team providers, and personally interpreted labs associated with the above diagnosis. I also ordered labs to further assess and manage the above addressed medical conditions. Return in about 6 weeks (around 6/18/2021) for DM type 1, VitD deficiency. The above issues were reviewed with the patient who understood and agreed with the plan. Thank you for allowing us to participate in the care of this patient. Please do not hesitate to contact us with any additional questions. Diagnosis Orders   1. Type 1 diabetes mellitus with hyperglycemia ([facility]  Ambulatory referral to Diabetic Education    Hemoglobin A1C   2. Vitamin D deficiency     3. Dietary noncompliance     4.  Morbidly obese ([facility]       Lord Corporal BUSTOS  Endocrinologist, [facility]   [address] [facility] [address]   Phone: [phone]  Fax: [phone]  --------------------------------------------  An electronic signature was used to authenticate this note.  Kira Gilman MD on 5/7/2021 at 9:51 PM

## 2021-05-17 DIAGNOSIS — E11.69 TYPE 2 DIABETES MELLITUS WITH OTHER SPECIFIED COMPLICATION, WITH LONG-TERM CURRENT USE OF INSULIN (HCC): ICD-10-CM

## 2021-05-17 DIAGNOSIS — Z79.4 TYPE 2 DIABETES MELLITUS WITH OTHER SPECIFIED COMPLICATION, WITH LONG-TERM CURRENT USE OF INSULIN (HCC): ICD-10-CM

## 2021-05-17 RX ORDER — ASPIRIN 81 MG/1
TABLET, COATED ORAL
Qty: 30 TABLET | Refills: 3 | OUTPATIENT
Start: 2021-05-17

## 2021-05-17 RX ORDER — LIRAGLUTIDE 6 MG/ML
INJECTION SUBCUTANEOUS
Qty: 6 ML | OUTPATIENT
Start: 2021-05-17

## 2021-05-19 ENCOUNTER — APPOINTMENT (OUTPATIENT)
Dept: GENERAL RADIOLOGY | Age: 36
End: 2021-05-19
Payer: COMMERCIAL

## 2021-05-19 ENCOUNTER — HOSPITAL ENCOUNTER (EMERGENCY)
Age: 36
Discharge: HOME | End: 2021-05-19
Payer: COMMERCIAL

## 2021-05-19 VITALS
DIASTOLIC BLOOD PRESSURE: 64 MMHG | TEMPERATURE: 96.3 F | SYSTOLIC BLOOD PRESSURE: 106 MMHG | HEART RATE: 84 BPM | RESPIRATION RATE: 18 BRPM | OXYGEN SATURATION: 97 %

## 2021-05-19 DIAGNOSIS — Z51.89 VISIT FOR WOUND CHECK: ICD-10-CM

## 2021-05-19 DIAGNOSIS — R73.9 HYPERGLYCEMIA: Primary | ICD-10-CM

## 2021-05-19 DIAGNOSIS — Z23 NEED FOR TETANUS BOOSTER: ICD-10-CM

## 2021-05-19 LAB
ALBUMIN SERPL-MCNC: 3.8 G/DL (ref 3.5–5.2)
ALP BLD-CCNC: 150 U/L (ref 35–104)
ALT SERPL-CCNC: 30 U/L (ref 0–32)
ANION GAP SERPL CALCULATED.3IONS-SCNC: 11 MMOL/L (ref 7–16)
AST SERPL-CCNC: 50 U/L (ref 0–31)
BASOPHILS ABSOLUTE: 0.09 E9/L (ref 0–0.2)
BASOPHILS RELATIVE PERCENT: 1.2 % (ref 0–2)
BETA-HYDROXYBUTYRATE: 0.25 MMOL/L (ref 0.02–0.27)
BILIRUB SERPL-MCNC: 0.6 MG/DL (ref 0–1.2)
BUN BLDV-MCNC: 14 MG/DL (ref 6–20)
CALCIUM SERPL-MCNC: 9.8 MG/DL (ref 8.6–10.2)
CHLORIDE BLD-SCNC: 92 MMOL/L (ref 98–107)
CHP ED QC CHECK: NORMAL
CHP ED QC CHECK: NORMAL
CO2: 28 MMOL/L (ref 22–29)
CREAT SERPL-MCNC: 1.7 MG/DL (ref 0.5–1)
EOSINOPHILS ABSOLUTE: 0.22 E9/L (ref 0.05–0.5)
EOSINOPHILS RELATIVE PERCENT: 2.9 % (ref 0–6)
GFR AFRICAN AMERICAN: 41
GFR NON-AFRICAN AMERICAN: 34 ML/MIN/1.73
GLUCOSE BLD-MCNC: 278 MG/DL
GLUCOSE BLD-MCNC: 351 MG/DL
GLUCOSE BLD-MCNC: 358 MG/DL (ref 74–99)
HCG, URINE, POC: NEGATIVE
HCT VFR BLD CALC: 44 % (ref 34–48)
HEMOGLOBIN: 14.6 G/DL (ref 11.5–15.5)
IMMATURE GRANULOCYTES #: 0.02 E9/L
IMMATURE GRANULOCYTES %: 0.3 % (ref 0–5)
LACTIC ACID, SEPSIS: 1.5 MMOL/L (ref 0.5–1.9)
LYMPHOCYTES ABSOLUTE: 3.31 E9/L (ref 1.5–4)
LYMPHOCYTES RELATIVE PERCENT: 43.7 % (ref 20–42)
Lab: NORMAL
MCH RBC QN AUTO: 26.4 PG (ref 26–35)
MCHC RBC AUTO-ENTMCNC: 33.2 % (ref 32–34.5)
MCV RBC AUTO: 79.6 FL (ref 80–99.9)
METER GLUCOSE: 278 MG/DL (ref 74–99)
METER GLUCOSE: 351 MG/DL (ref 74–99)
MONOCYTES ABSOLUTE: 0.42 E9/L (ref 0.1–0.95)
MONOCYTES RELATIVE PERCENT: 5.5 % (ref 2–12)
NEGATIVE QC PASS/FAIL: NORMAL
NEUTROPHILS ABSOLUTE: 3.52 E9/L (ref 1.8–7.3)
NEUTROPHILS RELATIVE PERCENT: 46.4 % (ref 43–80)
PDW BLD-RTO: 12.2 FL (ref 11.5–15)
PLATELET # BLD: 284 E9/L (ref 130–450)
PMV BLD AUTO: 11.3 FL (ref 7–12)
POSITIVE QC PASS/FAIL: NORMAL
POTASSIUM REFLEX MAGNESIUM: 5 MMOL/L (ref 3.5–5)
RBC # BLD: 5.53 E12/L (ref 3.5–5.5)
REASON FOR REJECTION: NORMAL
REJECTED TEST: NORMAL
SODIUM BLD-SCNC: 131 MMOL/L (ref 132–146)
TOTAL PROTEIN: 8.5 G/DL (ref 6.4–8.3)
WBC # BLD: 7.6 E9/L (ref 4.5–11.5)

## 2021-05-19 PROCEDURE — 90471 IMMUNIZATION ADMIN: CPT | Performed by: NURSE PRACTITIONER

## 2021-05-19 PROCEDURE — 6360000002 HC RX W HCPCS: Performed by: NURSE PRACTITIONER

## 2021-05-19 PROCEDURE — 82010 KETONE BODYS QUAN: CPT

## 2021-05-19 PROCEDURE — 73660 X-RAY EXAM OF TOE(S): CPT

## 2021-05-19 PROCEDURE — 6370000000 HC RX 637 (ALT 250 FOR IP): Performed by: NURSE PRACTITIONER

## 2021-05-19 PROCEDURE — 90715 TDAP VACCINE 7 YRS/> IM: CPT | Performed by: NURSE PRACTITIONER

## 2021-05-19 PROCEDURE — 80053 COMPREHEN METABOLIC PANEL: CPT

## 2021-05-19 PROCEDURE — 99282 EMERGENCY DEPT VISIT SF MDM: CPT

## 2021-05-19 PROCEDURE — 82962 GLUCOSE BLOOD TEST: CPT

## 2021-05-19 PROCEDURE — 83605 ASSAY OF LACTIC ACID: CPT

## 2021-05-19 PROCEDURE — 85025 COMPLETE CBC W/AUTO DIFF WBC: CPT

## 2021-05-19 RX ORDER — 0.9 % SODIUM CHLORIDE 0.9 %
1000 INTRAVENOUS SOLUTION INTRAVENOUS ONCE
Status: DISCONTINUED | OUTPATIENT
Start: 2021-05-19 | End: 2021-05-19 | Stop reason: HOSPADM

## 2021-05-19 RX ORDER — DOXYCYCLINE HYCLATE 100 MG
100 TABLET ORAL 2 TIMES DAILY
Qty: 20 TABLET | Refills: 0 | Status: SHIPPED | OUTPATIENT
Start: 2021-05-19 | End: 2021-05-29

## 2021-05-19 RX ORDER — DOXYCYCLINE HYCLATE 100 MG/1
100 CAPSULE ORAL ONCE
Status: COMPLETED | OUTPATIENT
Start: 2021-05-19 | End: 2021-05-19

## 2021-05-19 RX ADMIN — DOXYCYCLINE HYCLATE 100 MG: 100 CAPSULE ORAL at 15:17

## 2021-05-19 RX ADMIN — TETANUS TOXOID, REDUCED DIPHTHERIA TOXOID AND ACELLULAR PERTUSSIS VACCINE, ADSORBED 0.5 ML: 5; 2.5; 8; 8; 2.5 SUSPENSION INTRAMUSCULAR at 15:19

## 2021-05-19 NOTE — ED PROVIDER NOTES
[facility]  Department of Emergency Medicine   ED  Encounter Note  Admit Date/RoomTime: 2021 11:04 AM  ED Room: [facility]    NAME: Erica Kwok  : 1985  MRN: [id number]     Chief Complaint:  Toe Pain (R Great toe wound, states she has a hx DM and wants it's checked. )    History of Present Illness       Erica Kwok is a 38 y.o. old female presenting to the emergency department for complaint of right great toe wound. She reports that last evening she noticed a small scab to the bottom of her right great toe. Reports history of IDDM. 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 Denies any known injury. States that the small bruise to the right great toe nailbed is due to Gatorade bottle falling on her toe a months ago. Reports that her blood sugar this morning was 60, states that she did eat and drink after seeing that her blood sugar was low. Dates that over the past few days she has had a couple incidents of blood sugars over 300. Reports being compliant with her diabetes medications. Denies fever, chills, nausea, vomiting, abdominal pain, chest pain, shortness of breath, cough, congestion, concern for COVID-19, lightheadedness, dizziness, syncope, or any other complaints at this time. She reports that she basically does not have pain to the right great toe. History of neuropathy. Since onset the symptoms have been unchanged. Tetanus Status: more than 5 years ago. ROS   Pertinent positives and negatives are stated within HPI, all other systems reviewed and are negative. Past Medical History:  has a past medical history of Adrenal abnormality ([address] Cellulitis of foot, right, Diabetes mellitus ([address] Failed skin graft, Ischemic toe, and Skin flap necrosis ([address] Surgical History:  has a past surgical history that includes Cholecystectomy;   section; Foot Debridement (Right, 7/7/2020); and Foot Debridement (Right, 7/10/2020). Social History:  reports that she is a non-smoker but has been exposed to tobacco smoke. She has never used smokeless tobacco. She reports that she does not drink alcohol and does not use drugs. Family History: family history includes Heart Attack in her father. Allergies: Rocephin [ceftriaxone]    Physical Exam   Oxygen Saturation Interpretation: Normal.        ED Triage Vitals   BP Temp Temp src Pulse Resp SpO2 Height Weight   05/19/21 1103 05/19/21 1058 -- 05/19/21 1058 05/19/21 1103 05/19/21 1058 -- --   (!) 98/57 96.3 °F (35.7 °C)  98 16 94 %           Constitutional:  Alert, development consistent with age. Neck:  Normal ROM. Supple. GI: Abdomen soft, nontender, normal bowel sounds. CV: Regular rate and rhythm. No murmurs or gallops. No chest wall tenderness. Respiratory: Sounds clear and equal bilaterally. There is no rales, wheezing, or rhonchi  Toe(s):   right, great toe. Tenderness: none. Swelling: None. Deformity: no.              ROM: full range of motion. Skin: Small scabbed site noted to volar aspect of mid right great toe pad. There is no surrounding erythema. No drainage. No bleeding. Area appears to be some type of previous puncture site. Small area noted to right great toe nailbed which is ecchymotic. No other abnormalities noted. Right DP pulse 2+ palpable. Neurovascular: Motor deficit: none. Sensory deficit: none. Pulse deficit: none. Capillary refill: normal.  Foot:             Tenderness:  none. Swelling: None. Deformity: no.            ROM: full range of motion. Skin:  normal exam; no wounds, erythema, or swelling. Old amputation site noted to right third toe region. Area is clean and dry. No swelling or erythema.   Gait:  normal.  Lymphatics: No lymphangitis or adenopathy noted. Neurological:  Oriented. Motor functions intact. Isai Sloan **Informed Consent**    The patient has given verbal consent to have photos taken of right great toe and inserted into their ED Provider Note as part of their permanent medical record for purposes of documentation, treatment management and/or medical review. All Images taken on 5/19/21 of patient name: Amado Douglas were transmitted and stored on secured [employer] located within [facility] by a registered [employer] Mobile Application Device.          Right great toe  Lab / Imaging Results   (All laboratory and radiology results have been personally reviewed by myself)  Labs:  Results for orders placed or performed during the hospital encounter of 05/19/21   Lactate, Sepsis   Result Value Ref Range    Lactic Acid, Sepsis 1.5 0.5 - 1.9 mmol/L   SPECIMEN REJECTION   Result Value Ref Range    Rejected Test CMPX, BHOB     Reason for Rejection see below    CBC Auto Differential   Result Value Ref Range    WBC 7.6 4.5 - 11.5 E9/L    RBC 5.53 (H) 3.50 - 5.50 E12/L    Hemoglobin 14.6 11.5 - 15.5 g/dL    Hematocrit 44.0 34.0 - 48.0 %    MCV 79.6 (L) 80.0 - 99.9 fL    MCH 26.4 26.0 - 35.0 pg    MCHC 33.2 32.0 - 34.5 %    RDW 12.2 11.5 - 15.0 fL    Platelets 319 938 - 697 E9/L    MPV 11.3 7.0 - 12.0 fL    Neutrophils % 46.4 43.0 - 80.0 %    Immature Granulocytes % 0.3 0.0 - 5.0 %    Lymphocytes % 43.7 (H) 20.0 - 42.0 %    Monocytes % 5.5 2.0 - 12.0 %    Eosinophils % 2.9 0.0 - 6.0 %    Basophils % 1.2 0.0 - 2.0 %    Neutrophils Absolute 3.52 1.80 - 7.30 E9/L    Immature Granulocytes # 0.02 E9/L    Lymphocytes Absolute 3.31 1.50 - 4.00 E9/L    Monocytes Absolute 0.42 0.10 - 0.95 E9/L    Eosinophils Absolute 0.22 0.05 - 0.50 E9/L    Basophils Absolute 0.09 0.00 - 0.20 E9/L   Comprehensive Metabolic Panel w/ Reflex to MG   Result Value Ref Range    Sodium 131 (L) 132 - 146 mmol/L    Potassium reflex Magnesium 5.0 3.5 - to be established for IV saline hydration. Patient did drink 3 glasses of water while she was in the ED. Repeat blood sugar 278. Plan is for discharge. No signs or concerns for DKA. Consult(s):   None    Procedure(s):   none    MDM:    Milo Her is a 38-year-old female who presented to the emergency department for complaint of wound to right great toe. She reported noticing a puncture site to the bottom of her right great toe last evening. Denies any known trauma or injury. Reported concerned because she has a history of DM and in the past had to have her right third toe amputated. She reported that this morning her blood sugar was 60 so she can eat and drink high sugar that beverages and snacks. Reported taking her insulin as normal.  No fever or chills. No nausea or vomiting. No neurovascular deficits or motor deficits. Imaging of right great toe was reassuring for no acute osseous abnormalities or malalignment. There were no concerning findings for osteomyelitis. On physical exam she had a small scab area noted to the pad of her right great toe. Patient placed in chart above. CBC no leukocytosis, H&H 11.6/44.0. Lactic acid 1.5. Initial POC blood sugar was found to be elevated at 351. CMP showed blood sugar being elevated 358. And ion gap 11. Mild worsening of her kidney function test which was discussed and patient instructed to follow-up with her PCP for repeat blood work. Beta hydroxybutyrate 0.25. IV access was unable to be obtained therefore IV hydration was not given. She was able to drink several glasses of water without difficulty. No nausea or vomiting. Repeat BGL after oral hydration was 278. Wound care was provided and wound care instructions discussed. She verbalized understanding. She was given specific instructions to follow-up with her podiatrist for further evaluation. She verbalized understanding agrees with plan. No neurovascular deficits. Prescribed doxycycline.

## 2021-06-17 ENCOUNTER — TELEPHONE (OUTPATIENT)
Dept: DIABETES SERVICES | Age: 36
End: 2021-06-17

## 2021-06-18 ENCOUNTER — TELEPHONE (OUTPATIENT)
Dept: FAMILY MEDICINE CLINIC | Age: 36
End: 2021-06-18

## 2021-06-18 DIAGNOSIS — E11.69 TYPE 2 DIABETES MELLITUS WITH OTHER SPECIFIED COMPLICATION, WITH LONG-TERM CURRENT USE OF INSULIN (HCC): ICD-10-CM

## 2021-06-18 DIAGNOSIS — Z79.4 TYPE 2 DIABETES MELLITUS WITH OTHER SPECIFIED COMPLICATION, WITH LONG-TERM CURRENT USE OF INSULIN (HCC): ICD-10-CM

## 2021-06-18 RX ORDER — GABAPENTIN 600 MG/1
TABLET ORAL
Qty: 60 TABLET | Refills: 2 | Status: SHIPPED | OUTPATIENT
Start: 2021-06-18 | End: 2021-07-18

## 2021-06-18 RX ORDER — GLUCOSAMINE HCL/CHONDROITIN SU 500-400 MG
CAPSULE ORAL
Qty: 100 STRIP | Refills: 2 | Status: SHIPPED | OUTPATIENT
Start: 2021-06-18 | End: 2021-08-24

## 2021-06-28 ENCOUNTER — TELEPHONE (OUTPATIENT)
Dept: FAMILY MEDICINE CLINIC | Age: 36
End: 2021-06-28

## 2021-06-28 DIAGNOSIS — E11.69 TYPE 2 DIABETES MELLITUS WITH OTHER SPECIFIED COMPLICATION, WITH LONG-TERM CURRENT USE OF INSULIN (HCC): Primary | ICD-10-CM

## 2021-06-28 DIAGNOSIS — Z79.4 TYPE 2 DIABETES MELLITUS WITH OTHER SPECIFIED COMPLICATION, WITH LONG-TERM CURRENT USE OF INSULIN (HCC): Primary | ICD-10-CM

## 2021-06-28 NOTE — TELEPHONE ENCOUNTER
New script for blood glucose monitoring kit needs sent to [facility]     Her insurance will no longer cover what she has. The pharmacy just needs orders sent for a new kit & all supplies so they can work with insurance to see what is covered. Include # of times per day patient tests BS on script. Thank you.

## 2021-07-21 ENCOUNTER — APPOINTMENT (OUTPATIENT)
Dept: GENERAL RADIOLOGY | Age: 36
End: 2021-07-21
Payer: COMMERCIAL

## 2021-07-21 ENCOUNTER — HOSPITAL ENCOUNTER (EMERGENCY)
Age: 36
Discharge: HOME | End: 2021-07-21
Attending: EMERGENCY MEDICINE
Payer: COMMERCIAL

## 2021-07-21 VITALS
BODY MASS INDEX: 40.06 KG/M2 | WEIGHT: 219 LBS | TEMPERATURE: 96.4 F | OXYGEN SATURATION: 95 % | SYSTOLIC BLOOD PRESSURE: 110 MMHG | DIASTOLIC BLOOD PRESSURE: 62 MMHG | HEART RATE: 98 BPM | RESPIRATION RATE: 16 BRPM

## 2021-07-21 DIAGNOSIS — Z51.89 VISIT FOR WOUND CHECK: Primary | ICD-10-CM

## 2021-07-21 LAB
ALBUMIN SERPL-MCNC: 3.9 G/DL (ref 3.5–5.2)
ALP BLD-CCNC: 148 U/L (ref 35–104)
ALT SERPL-CCNC: 27 U/L (ref 0–32)
ANION GAP SERPL CALCULATED.3IONS-SCNC: 9 MMOL/L (ref 7–16)
AST SERPL-CCNC: 39 U/L (ref 0–31)
BACTERIA: ABNORMAL /HPF
BASOPHILS ABSOLUTE: 0.1 E9/L (ref 0–0.2)
BASOPHILS RELATIVE PERCENT: 1 % (ref 0–2)
BILIRUB SERPL-MCNC: 0.6 MG/DL (ref 0–1.2)
BILIRUBIN URINE: NEGATIVE
BLOOD, URINE: NEGATIVE
BUN BLDV-MCNC: 16 MG/DL (ref 6–20)
CALCIUM SERPL-MCNC: 9.2 MG/DL (ref 8.6–10.2)
CHLORIDE BLD-SCNC: 100 MMOL/L (ref 98–107)
CLARITY: CLEAR
CO2: 26 MMOL/L (ref 22–29)
COLOR: YELLOW
CREAT SERPL-MCNC: 1.1 MG/DL (ref 0.5–1)
EOSINOPHILS ABSOLUTE: 0.29 E9/L (ref 0.05–0.5)
EOSINOPHILS RELATIVE PERCENT: 2.9 % (ref 0–6)
EPITHELIAL CELLS, UA: ABNORMAL /HPF
GFR AFRICAN AMERICAN: >60
GFR NON-AFRICAN AMERICAN: 56 ML/MIN/1.73
GLUCOSE BLD-MCNC: 263 MG/DL (ref 74–99)
GLUCOSE URINE: 100 MG/DL
HCG, URINE, POC: NEGATIVE
HCT VFR BLD CALC: 38.1 % (ref 34–48)
HEMOGLOBIN: 12.6 G/DL (ref 11.5–15.5)
IMMATURE GRANULOCYTES #: 0.06 E9/L
IMMATURE GRANULOCYTES %: 0.6 % (ref 0–5)
KETONES, URINE: NEGATIVE MG/DL
LACTIC ACID: 1.3 MMOL/L (ref 0.5–2.2)
LEUKOCYTE ESTERASE, URINE: ABNORMAL
LYMPHOCYTES ABSOLUTE: 3.72 E9/L (ref 1.5–4)
LYMPHOCYTES RELATIVE PERCENT: 36.7 % (ref 20–42)
Lab: NORMAL
MCH RBC QN AUTO: 26.3 PG (ref 26–35)
MCHC RBC AUTO-ENTMCNC: 33.1 % (ref 32–34.5)
MCV RBC AUTO: 79.5 FL (ref 80–99.9)
MONOCYTES ABSOLUTE: 0.49 E9/L (ref 0.1–0.95)
MONOCYTES RELATIVE PERCENT: 4.8 % (ref 2–12)
NEGATIVE QC PASS/FAIL: NORMAL
NEUTROPHILS ABSOLUTE: 5.49 E9/L (ref 1.8–7.3)
NEUTROPHILS RELATIVE PERCENT: 54 % (ref 43–80)
NITRITE, URINE: NEGATIVE
PDW BLD-RTO: 13.2 FL (ref 11.5–15)
PH UA: 5.5 (ref 5–9)
PLATELET # BLD: 321 E9/L (ref 130–450)
PMV BLD AUTO: 11 FL (ref 7–12)
POSITIVE QC PASS/FAIL: NORMAL
POTASSIUM SERPL-SCNC: 4.4 MMOL/L (ref 3.5–5)
PROTEIN UA: NEGATIVE MG/DL
RBC # BLD: 4.79 E12/L (ref 3.5–5.5)
RBC UA: ABNORMAL /HPF (ref 0–2)
SODIUM BLD-SCNC: 135 MMOL/L (ref 132–146)
SPECIFIC GRAVITY UA: 1.02 (ref 1–1.03)
TOTAL PROTEIN: 7.7 G/DL (ref 6.4–8.3)
UROBILINOGEN, URINE: 0.2 E.U./DL
WBC # BLD: 10.2 E9/L (ref 4.5–11.5)
WBC UA: ABNORMAL /HPF (ref 0–5)

## 2021-07-21 PROCEDURE — 80053 COMPREHEN METABOLIC PANEL: CPT

## 2021-07-21 PROCEDURE — 81001 URINALYSIS AUTO W/SCOPE: CPT

## 2021-07-21 PROCEDURE — 85025 COMPLETE CBC W/AUTO DIFF WBC: CPT

## 2021-07-21 PROCEDURE — 73660 X-RAY EXAM OF TOE(S): CPT

## 2021-07-21 PROCEDURE — 83605 ASSAY OF LACTIC ACID: CPT

## 2021-07-21 PROCEDURE — 6370000000 HC RX 637 (ALT 250 FOR IP): Performed by: EMERGENCY MEDICINE

## 2021-07-21 PROCEDURE — 99283 EMERGENCY DEPT VISIT LOW MDM: CPT

## 2021-07-21 RX ORDER — DIAPER,BRIEF,INFANT-TODD,DISP
EACH MISCELLANEOUS ONCE
Status: COMPLETED | OUTPATIENT
Start: 2021-07-21 | End: 2021-07-21

## 2021-07-21 RX ORDER — BACITRACIN, NEOMYCIN, POLYMYXIN B 400; 3.5; 5 [USP'U]/G; MG/G; [USP'U]/G
OINTMENT TOPICAL
Qty: 30 G | Refills: 0 | Status: SHIPPED | OUTPATIENT
Start: 2021-07-21 | End: 2021-07-31

## 2021-07-21 RX ORDER — DOXYCYCLINE HYCLATE 100 MG
100 TABLET ORAL 2 TIMES DAILY
Qty: 20 TABLET | Refills: 0 | Status: SHIPPED | OUTPATIENT
Start: 2021-07-21 | End: 2021-07-31

## 2021-07-21 RX ADMIN — BACITRACIN ZINC: 500 OINTMENT TOPICAL at 22:36

## 2021-07-21 ASSESSMENT — PAIN DESCRIPTION - DESCRIPTORS: DESCRIPTORS: ACHING

## 2021-07-21 ASSESSMENT — PAIN DESCRIPTION - FREQUENCY: FREQUENCY: CONTINUOUS

## 2021-07-21 ASSESSMENT — PAIN DESCRIPTION - ORIENTATION: ORIENTATION: RIGHT

## 2021-07-21 ASSESSMENT — PAIN DESCRIPTION - LOCATION: LOCATION: TOE (COMMENT WHICH ONE)

## 2021-07-21 ASSESSMENT — PAIN SCALES - GENERAL: PAINLEVEL_OUTOF10: 4

## 2021-07-21 ASSESSMENT — PAIN DESCRIPTION - PAIN TYPE: TYPE: ACUTE PAIN

## 2021-07-21 NOTE — ED NOTES
FIRST PROVIDER CONTACT ASSESSMENT NOTE                                                                                                Department of Emergency Medicine                                                      First Provider Note  21  6:50 PM EDT  NAME: Antoine Karimi  : 1985  MRN: [id number]    Chief Complaint: Wound Check (right 2nd toe wound)      History of Present Illness:   Antoine Karimi is a 38 y.o. female who presents to the ED for right foot second metatarsal wound. Patient reports noticing it 2 days ago. States history of diabetes has had diabetic wounds in the past.  Patient denies fevers. Denies a draining. Focused Physical Exam:  VS:    ED Triage Vitals   BP Temp Temp src Pulse Resp SpO2 Height Weight   21 1849 21 1825 -- 21 1825 21 1849 21 182 -- 21 184   105/64 96.4 °F (35.8 °C)  102 18 95 %  219 lb (99.3 kg)        General: Alert and in no apparent distress. Medical History:  has a past medical history of Adrenal abnormality ([address] Cellulitis of foot, right, Diabetes mellitus ([address] Failed skin graft, Ischemic toe, and Skin flap necrosis ([address] Surgical History:  has a past surgical history that includes Cholecystectomy;  section; Foot Debridement (Right, 2020); and Foot Debridement (Right, 7/10/2020). Social History:  reports that she is a non-smoker but has been exposed to tobacco smoke. She has never used smokeless tobacco. She reports that she does not drink alcohol and does not use drugs. Family History: family history includes Heart Attack in her father.     Allergies: Rocephin [ceftriaxone]     Initial Plan of Care:  Initiate Treatment-Testing, Proceed toTreatment Area When Bed Available for ED Attending/MLP to Continue Care    -------------------------------------------------END OF FIRST PROVIDER CONTACT ASSESSMENT

## 2021-07-22 ENCOUNTER — OFFICE VISIT (OUTPATIENT)
Dept: FAMILY MEDICINE CLINIC | Age: 36
End: 2021-07-22
Payer: COMMERCIAL

## 2021-07-22 VITALS
HEART RATE: 99 BPM | HEIGHT: 62 IN | DIASTOLIC BLOOD PRESSURE: 62 MMHG | TEMPERATURE: 97.1 F | WEIGHT: 226 LBS | BODY MASS INDEX: 41.59 KG/M2 | RESPIRATION RATE: 18 BRPM | OXYGEN SATURATION: 99 % | SYSTOLIC BLOOD PRESSURE: 91 MMHG

## 2021-07-22 DIAGNOSIS — I73.9 PAD (PERIPHERAL ARTERY DISEASE) (HCC): ICD-10-CM

## 2021-07-22 DIAGNOSIS — Z79.4 TYPE 2 DIABETES MELLITUS WITH OTHER SPECIFIED COMPLICATION, WITH LONG-TERM CURRENT USE OF INSULIN (HCC): Primary | ICD-10-CM

## 2021-07-22 DIAGNOSIS — M65.4 DE QUERVAIN'S DISEASE (TENOSYNOVITIS): ICD-10-CM

## 2021-07-22 DIAGNOSIS — E11.69 TYPE 2 DIABETES MELLITUS WITH OTHER SPECIFIED COMPLICATION, WITH LONG-TERM CURRENT USE OF INSULIN (HCC): Primary | ICD-10-CM

## 2021-07-22 LAB — HBA1C MFR BLD: 11.6 %

## 2021-07-22 PROCEDURE — 3046F HEMOGLOBIN A1C LEVEL >9.0%: CPT | Performed by: FAMILY MEDICINE

## 2021-07-22 PROCEDURE — G8417 CALC BMI ABV UP PARAM F/U: HCPCS | Performed by: FAMILY MEDICINE

## 2021-07-22 PROCEDURE — G8427 DOCREV CUR MEDS BY ELIG CLIN: HCPCS | Performed by: FAMILY MEDICINE

## 2021-07-22 PROCEDURE — 83036 HEMOGLOBIN GLYCOSYLATED A1C: CPT | Performed by: FAMILY MEDICINE

## 2021-07-22 PROCEDURE — 99213 OFFICE O/P EST LOW 20 MIN: CPT | Performed by: FAMILY MEDICINE

## 2021-07-22 PROCEDURE — 1036F TOBACCO NON-USER: CPT | Performed by: FAMILY MEDICINE

## 2021-07-22 PROCEDURE — 2022F DILAT RTA XM EVC RTNOPTHY: CPT | Performed by: FAMILY MEDICINE

## 2021-07-22 RX ORDER — ATORVASTATIN CALCIUM 40 MG/1
20 TABLET, FILM COATED ORAL DAILY
Qty: 30 TABLET | Refills: 2 | Status: SHIPPED
Start: 2021-07-22 | End: 2021-09-23 | Stop reason: SDUPTHER

## 2021-07-22 RX ORDER — ONDANSETRON 4 MG/1
4 TABLET, ORALLY DISINTEGRATING ORAL EVERY 8 HOURS PRN
Qty: 24 TABLET | Refills: 0 | Status: SHIPPED | OUTPATIENT
Start: 2021-07-22

## 2021-07-22 NOTE — ED PROVIDER NOTES
HPI:  21, Time: 9:42 PM EDT         Margarita Smoker is a 38 y.o. female presenting to the ED for wound to second toe on right, beginning days ago. The complaint has been persistent, mild in severity, and worsened by nothing. Patient reports history of diabetes. Patient reporting that she believes that the toe is being rubbed up against her shoe. Patient reported to the slightly flex normally. Patient reporting there is no new swelling to the foot. Patient reporting no fever chills she reports no chest pain she is diabetic though. Patient does have history of amputation of her third toe on the right. Patient reports normal swelling to the foot since the surgery. Patient reporting no redness or drainage. ROS:   Pertinent positives and negatives are stated within HPI, all other systems reviewed and are negative.  --------------------------------------------- PAST HISTORY ---------------------------------------------  Past Medical History:  has a past medical history of Adrenal abnormality ([facility] Cellulitis of foot, right, Diabetes mellitus ([facility] Failed skin graft, Ischemic toe, and Skin flap necrosis ([facility] Past Surgical History:  has a past surgical history that includes Cholecystectomy;  section; Foot Debridement (Right, 2020); and Foot Debridement (Right, 7/10/2020). Social History:  reports that she is a non-smoker but has been exposed to tobacco smoke. She has never used smokeless tobacco. She reports that she does not drink alcohol and does not use drugs. Family History: family history includes Heart Attack in her father. The patients home medications have been reviewed.     Allergies: Rocephin [ceftriaxone]    ---------------------------------------------------PHYSICAL EXAM--------------------------------------    Constitutional/General: Alert and oriented x3, well appearing, non toxic in NAD  Head: Normocephalic and atraumatic  Eyes: PERRL, EOMI  Mouth: Oropharynx clear, handling secretions, no trismus  Neck: Supple, full ROM, non tender to palpation in the midline, no stridor, no crepitus, no meningeal signs  Pulmonary: Lungs clear to auscultation bilaterally, no wheezes, rales, or rhonchi. Not in respiratory distress  Cardiovascular:  Regular rate. Regular rhythm. No murmurs, gallops, or rubs. 2+ distal pulses  Chest: no chest wall tenderness  Abdomen: Soft. Non tender. Non distended. +BS. No rebound, guarding, or rigidity. No pulsatile masses appreciated. Musculoskeletal: Moves all extremities x 4. Noted amputation from prior surgery to right middle toe. Patient has superficial ulceration to her second toe on the plantar surface. There is no redness. There is no drainage. Warm and well perfused, no clubbing, cyanosis, or edema. Capillary refill <3 seconds  Skin: warm and dry. No rashes. Neurologic: GCS 15, CN 2-12 grossly intact, no focal deficits, symmetric strength 5/5 in the upper and lower extremities bilaterally  Psych: Normal Affect    -------------------------------------------------- RESULTS -------------------------------------------------  I have personally reviewed all laboratory and imaging results for this patient. Results are listed below.      LABS:  Results for orders placed or performed during the hospital encounter of 07/21/21   CBC Auto Differential   Result Value Ref Range    WBC 10.2 4.5 - 11.5 E9/L    RBC 4.79 3.50 - 5.50 E12/L    Hemoglobin 12.6 11.5 - 15.5 g/dL    Hematocrit 38.1 34.0 - 48.0 %    MCV 79.5 (L) 80.0 - 99.9 fL    MCH 26.3 26.0 - 35.0 pg    MCHC 33.1 32.0 - 34.5 %    RDW 13.2 11.5 - 15.0 fL    Platelets 023 112 - 815 E9/L    MPV 11.0 7.0 - 12.0 fL    Neutrophils % 54.0 43.0 - 80.0 %    Immature Granulocytes % 0.6 0.0 - 5.0 %    Lymphocytes % 36.7 20.0 - 42.0 %    Monocytes % 4.8 2.0 - 12.0 %    Eosinophils % 2.9 0.0 - 6.0 %    Basophils % 1.0 0.0 - 2.0 %    Neutrophils Absolute 5.49 1.80 - 7.30 E9/L    Immature Granulocytes # 0.06 E9/L    Lymphocytes Absolute 3.72 1.50 - 4.00 E9/L    Monocytes Absolute 0.49 0.10 - 0.95 E9/L    Eosinophils Absolute 0.29 0.05 - 0.50 E9/L    Basophils Absolute 0.10 0.00 - 0.20 E9/L   Comprehensive Metabolic Panel   Result Value Ref Range    Sodium 135 132 - 146 mmol/L    Potassium 4.4 3.5 - 5.0 mmol/L    Chloride 100 98 - 107 mmol/L    CO2 26 22 - 29 mmol/L    Anion Gap 9 7 - 16 mmol/L    Glucose 263 (H) 74 - 99 mg/dL    BUN 16 6 - 20 mg/dL    CREATININE 1.1 (H) 0.5 - 1.0 mg/dL    GFR Non-African American 56 >=60 mL/min/1.73    GFR African American >60     Calcium 9.2 8.6 - 10.2 mg/dL    Total Protein 7.7 6.4 - 8.3 g/dL    Albumin 3.9 3.5 - 5.2 g/dL    Total Bilirubin 0.6 0.0 - 1.2 mg/dL    Alkaline Phosphatase 148 (H) 35 - 104 U/L    ALT 27 0 - 32 U/L    AST 39 (H) 0 - 31 U/L   Lactic Acid, Plasma   Result Value Ref Range    Lactic Acid 1.3 0.5 - 2.2 mmol/L   Urinalysis   Result Value Ref Range    Color, UA Yellow Straw/Yellow    Clarity, UA Clear Clear    Glucose, Ur 100 (A) Negative mg/dL    Bilirubin Urine Negative Negative    Ketones, Urine Negative Negative mg/dL    Specific Gravity, UA 1.020 1.005 - 1.030    Blood, Urine Negative Negative    pH, UA 5.5 5.0 - 9.0    Protein, UA Negative Negative mg/dL    Urobilinogen, Urine 0.2 <2.0 E.U./dL    Nitrite, Urine Negative Negative    Leukocyte Esterase, Urine SMALL (A) Negative   Microscopic Urinalysis   Result Value Ref Range    WBC, UA 1-3 0 - 5 /HPF    RBC, UA NONE 0 - 2 /HPF    Epithelial Cells, UA FEW /HPF    Bacteria, UA FEW (A) None Seen /HPF   POC Pregnancy Urine   Result Value Ref Range    HCG, Urine, POC Negative Negative    Lot Number [id number]     Positive QC Pass/Fail Pass     Negative QC Pass/Fail Pass        RADIOLOGY:  Interpreted by Radiologist.  XR TOE RIGHT (MIN 2 VIEWS)   Final Result   1. Soft tissue swelling about the right large toe and 2nd digit. No   evidence of subcutaneous air.   No definite evidence of osseous destruction. 2.  Status post partial amputation of the right 3rd digit. Minimal right   large toe osteoarthritis. Calcaneal enthesophyte.                   ------------------------- NURSING NOTES AND VITALS REVIEWED ---------------------------   The nursing notes within the ED encounter and vital signs as below have been reviewed by myself. /64   Pulse 102   Temp 96.4 °F (35.8 °C)   Resp 18   Wt 219 lb (99.3 kg)   SpO2 95%   BMI 40.06 kg/m²   Oxygen Saturation Interpretation: Normal    The patients available past medical records and past encounters were reviewed. ------------------------------ ED COURSE/MEDICAL DECISION MAKING----------------------  Medications   bacitracin zinc ointment (has no administration in time range)             Medical Decision Making:   Patient presenting here because of wound superficial ulceration on her second toe on the right. Patient reports she believes it is from her rubbing her toe on the shoe. Patient reporting the toe has been swollen since having surgery on her third toe. Patient there is no redness there is no cellulitis or drainage. Labs noted reviewed. Patient is diabetic. Patient will be discharged home with outpatient follow-up she is to return if symptoms worsen or persist     Re-Evaluations:             Re-evaluation. Patients symptoms show no change  Made aware lab results and findings and x-rays. Patient made aware of need for follow-up and need to return if symptoms were to persist.  She does have an appoint with her family doctor she is also to follow-up with her podiatrist.    Consultations:                 Critical Care: This patient's ED course included: a personal history and physicial eaxmination    This patient has been closely monitored during their ED course. Counseling:    The emergency provider has spoken with the patient and discussed todays results, in addition to providing specific details for the plan of

## 2021-07-22 NOTE — PATIENT INSTRUCTIONS
Patient Education        Aracely Velasquez Disease: Exercises  Introduction  Here are some examples of exercises for you to try. The exercises may be suggested for a condition or for rehabilitation. Start each exercise slowly. Ease off the exercises if you start to have pain. You will be told when to start these exercises and which ones will work best for you. How to do the exercises  Thumb lifts   1. Place your hand on a flat surface, with your palm up. 2. Lift your thumb away from your palm to make a \"C\" shape. 3. Hold for about 6 seconds. 4. Repeat 8 to 12 times. Passive thumb MP flexion   1. Hold your hand in front of you, and turn your hand so your little finger faces down and your thumb faces up. (Your hand should be in the position used for shaking someone's hand.) You may also rest your hand on a flat surface. 2. Use the fingers on your other hand to bend your thumb down at the point where your thumb connects to your palm. 3. Hold for at least 15 to 30 seconds. 4. Repeat 2 to 4 times. Finkelstein stretch   1. Hold your arms out in front of you. (Your hand should be in the position used for shaking someone's hand.)  2. Bend your thumb toward your palm. 3. Use your other hand to gently stretch your thumb and wrist downward until you feel the stretch on the thumb side of your wrist.  4. Hold for at least 15 to 30 seconds. 5. Repeat 2 to 4 times. Resisted ulnar deviation   For this exercise, you will need elastic exercise material, such as surgical tubing or Thera-Band. 1. Sit leaning forward with your legs slightly spread and your elbow on your thigh. 2. Grasp one end of the band with your palm down, and step on the other end with the foot opposite the hand holding the band. 3. Slowly bend your wrist sideways and away from your knee. 4. Repeat 8 to 12 times. Follow-up care is a key part of your treatment and safety.  Be sure to make and go to all appointments, and call your doctor if you are having problems. It's also a good idea to know your test results and keep a list of the medicines you take. Where can you learn more? Go to [email or web address] org and sign in to your [email or web address] account. Enter Z136 in the [employer] box to learn more about \"De Quervain's Disease: Exercises. \"     If you do not have an account, please click on the \"Sign Up Now\" link. Current as of: November 16, 2020               Content Version: 12.9  © 2006-2021 [employer]. Care instructions adapted under license by [facility] ([facility]). If you have questions about a medical condition or this instruction, always ask your healthcare professional. [address] any warranty or liability for your use of this information. Patient Education        Jordan Wharton Tenosynovitis: Care Instructions  Your Care Instructions  Jordan Wharton (say \"[id number]-utfy-UEO\") tenosynovitis is a problem that makes the bottom of your thumb and the side of your wrist hurt. When you have de Quervain's, the ropey fiber (tendon) that helps move your thumb away from your fingers becomes swollen. You may have pain when you move your wrist or pick things up. You may hear a creaking sound when you move your wrist or thumb. Symptoms often get better in a few weeks with home care. Your doctor may want you to start some gentle stretching exercises once your symptoms are gone. Sometimes treatment with an injection or surgery is needed. Follow-up care is a key part of your treatment and safety. Be sure to make and go to all appointments, and call your doctor if you are having problems. It's also a good idea to know your test results and keep a list of the medicines you take. How can you care for yourself at home? · Until your symptoms are better, stop the activities that caused the pain. · Avoid moving the hand and wrist that hurt.   · Follow your doctor's directions for wearing a splint to keep your thumb and wrist from moving. · Try ice or heat. ? Put ice or a cold pack on your thumb and wrist for 10 to 20 minutes at a time. Put a thin cloth between the ice and your skin. ? You can use heat for 20 to 30 minutes, 2 or 3 times a day. Try using a heating pad, hot shower, or hot pack. · Ask your doctor if you can take an over-the-counter pain medicine, such as acetaminophen (Tylenol), ibuprofen (Advil, Motrin), or naproxen (Aleve). Be safe with medicines. Read and follow all instructions on the label. When should you call for help? Watch closely for changes in your health, and be sure to contact your doctor if:    · You have new or worse pain.     · You have new or worse numbness or tingling in your hand or fingers.     · Your hand feels weaker.     · You do not get better as expected. Where can you learn more? Go to https://[email or web address] [email or web address] org and sign in to your [employer] account. Enter N148 in the [employer] ([employer]) box to learn more about \"De Quervain's Tenosynovitis: Care Instructions. \"     If you do not have an account, please click on the \"Sign Up Now\" link. Current as of: November 16, 2020               Content Version: 12.9  © 2259-9245 Healthwise, Incorporated. Care instructions adapted under license by [facility] ([facility]). If you have questions about a medical condition or this instruction, always ask your healthcare professional. [place] [address] any warranty or liability for your use of this information.

## 2021-07-22 NOTE — PROGRESS NOTES
[facility]  Family Medicine Residency  Phone: [phone]  Fax: [phone]    Patient:  Placido Staples 38 y.o. female                                 Date of Service: 21                            Chiefcomplaint:   Chief Complaint   Patient presents with    Diabetes         History of Present Illness: The patient is a 38 y.o. female  presented to the clinic with complaints as above. Presents to establish     Hx of DM : on insulin, follows with Endocrinology, takes humalog 10 u tid with meal , lantus 40 units at night and victoza inject daily , used metformin in the past as it caused her diarrhea. Bilateral hand pain: she states she has CTS however never offically dx with CTS. She has pain in her ext palmar radialis area. Right foot wound: Status post partial amputation of the right 3rd digit which was last year and has abscess recently and finished abx. Review of Systems:   Review of Systems   Constitutional: Negative for chills and fever. HENT: Negative for congestion, sore throat and trouble swallowing. Respiratory: Negative for cough. Cardiovascular: Negative for chest pain and leg swelling. Gastrointestinal: Negative for abdominal pain, constipation, diarrhea, nausea and vomiting. Genitourinary: Negative for difficulty urinating. Musculoskeletal: Negative for arthralgias and myalgias. Hand pain     Skin: Positive for wound. Negative for rash. Neurological: Negative for dizziness and headaches. Psychiatric/Behavioral: Negative for agitation.        Past Medical History:      Diagnosis Date    Adrenal abnormality ([address]     Cellulitis of foot, right 2020    Diabetes mellitus ([address]     Failed skin graft 2020    Ischemic toe 2020    Skin flap necrosis ([address] 2020       Past Surgical History:        Procedure Laterality Date     SECTION      CHOLECYSTECTOMY      FOOT DEBRIDEMENT Right 2020 RIGHT 2ND TOE  INCISION AND DRAINAGE performed by Logan Bowden DPM at [facility] Right 7/10/2020    RIGHT FOOT INCISION AND DRAINAGE WITH AMPUTATION 3RD TOE, DELAY PRIMARY CLOSURE performed by Tirso Crain DPM at [facility] OR       Allergies:    Rocephin [ceftriaxone]    Social History:   Social History     Socioeconomic History    Marital status:      Spouse name: Not on file    Number of children: Not on file    Years of education: Not on file    Highest education level: Not on file   Occupational History    Not on file   Tobacco Use    Smoking status: Passive Smoke Exposure - Never Smoker    Smokeless tobacco: Never Used   Vaping Use    Vaping Use: Never used   Substance and Sexual Activity    Alcohol use: No    Drug use: No    Sexual activity: Not Currently     Partners: Male   Other Topics Concern    Not on file   Social History Narrative    Not on file     Social Determinants of Health     Financial Resource Strain: High Risk    Difficulty of Paying Living Expenses: Hard   Food Insecurity: Food Insecurity Present    Worried About Running Out of Food in the Last Year: Often true    Tyrone of Food in the Last Year: Often true   Transportation Needs: Unmet Transportation Needs    Lack of Transportation (Medical):  Yes    Lack of Transportation (Non-Medical): Yes   Physical Activity:     Days of Exercise per Week:     Minutes of Exercise per Session:    Stress:     Feeling of Stress :    Social Connections:     Frequency of Communication with Friends and Family:     Frequency of Social Gatherings with Friends and Family:     Attends Restorationism Services:     Active Member of Clubs or Organizations:     Attends Club or Organization Meetings:     Marital Status:    Intimate Partner Violence:     Fear of Current or Ex-Partner:     Emotionally Abused:     Physically Abused:     Sexually Abused:         Family History:       Problem Relation Age of Onset    Heart Attack Father        Physical Exam:    Vitals: BP 91/62   Pulse 99   Temp 97.1 °F (36.2 °C) (Temporal)   Resp 18   Ht 5' 2\" (1.575 m)   Wt 226 lb (102.5 kg)   SpO2 99%   BMI 41.34 kg/m²   BP Readings from Last 3 Encounters:   07/22/21 91/62   07/21/21 110/62   05/19/21 106/64     General Appearance: Well developed, awake, alert, oriented, no acute distress  HEENT: Normocephalic,atraumatic. PERRL, EOM's intact, EAC without erythema or swelling, no pallor or icterus. Neck: Supple, symmetrical, trachea midline. No JVD. Chest wall/Lung: Clear to auscultation  bilaterally,  respirations unlabored. No ronchi/wheezing/rales  Heart[de-identified]  Regular rate and rhythm, S1and S2 normal, no murmur, rub or gallop. Abdomen: Soft, non-tender, bowel sounds normoactive, no masses, no organomegaly  Extremities:  Extremities normal, atraumatic, no cyanosis,  edema. Skin: Skin color, texture, turgor normal, no rashes or lesions  Musculokeletal: ROM grossly normal in all joints of extremities, no obvious joint swelling. Lymph nodes: no lymph node enlargement appreciated  Neurologic:   Alert&Oriented. Normal gait and coordination  No focal neurological deficits appreaciated         Psychiatric: has a normal mood and affect. Behavior is normal.       Assessment and Plan:       1. Type 2 diabetes mellitus with other specified complication, with long-term current use of insulin ([address]  Recommended continue fu with endo  Recommended strict diet and exercise. - ondansetron (ZOFRAN ODT) 4 MG disintegrating tablet; Take 1 tablet by mouth every 8 hours as needed for Nausea or Vomiting  Dispense: 24 tablet; Refill: 0  - POCT glycosylated hemoglobin (Hb A1C)  - External Referral To Podiatry    2. PAD (peripheral artery disease) (HCC)  Takes aspirin and pletal  - External Referral To Podiatry      3.: Dequervain Tenosynovitis  Recommended avoiding using phone as continues and motion can be causing the symptoms.   PT can be option for future. Provided some resources       Return to Office: Return in about 4 weeks (around 8/19/2021). I encourage further reading and education about your health conditions. Information on many healthconditions is provided by the American Academy of Family Physicians: https://familydoctor. org/  Please bring any questions to me at your next visit. This document may have been prepared at least partiallythrough the use of voice recognition software. Although effort is taken to assure the accuracy of this document, it is possible that grammatical, syntax,  or spelling errors may occur. Medication List:    Current Outpatient Medications   Medication Sig Dispense Refill    ondansetron (ZOFRAN ODT) 4 MG disintegrating tablet Take 1 tablet by mouth every 8 hours as needed for Nausea or Vomiting 24 tablet 0    atorvastatin (LIPITOR) 40 MG tablet Take 0.5 tablets by mouth daily 30 tablet 2    neomycin-bacitracin-polymyxin (NEOSPORIN) 400-5-5000 ointment Apply topically 2 times daily. 30 g 0    doxycycline hyclate (VIBRA-TABS) 100 MG tablet Take 1 tablet by mouth 2 times daily for 10 days 20 tablet 0    Blood Glucose Monitoring Suppl w/Device KIT 1 kit by Does not apply route 4 times daily 1 kit 0    blood glucose monitor strips Test 2 times a day & as needed for symptoms of irregular blood glucose. Dispense sufficient amount for indicated testing frequency plus additional to accommodate PRN testing needs.  100 strip 2    acetaminophen (TYLENOL) 650 MG extended release tablet Take 1 tablet by mouth every 8 hours as needed for Pain 30 tablet 0    aspirin 81 MG EC tablet Take 1 tablet by mouth daily 30 tablet 3    cetirizine (ZYRTEC ALLERGY) 10 MG tablet Take 1 tablet by mouth daily 30 tablet 3    famotidine (PEPCID) 20 MG tablet Take 1 tablet by mouth 2 times daily 60 tablet 3    ibuprofen (ADVIL;MOTRIN) 600 MG tablet Take 1 tablet by mouth 4 times daily as needed for Pain 40 tablet 0    insulin glargine

## 2021-07-22 NOTE — PROGRESS NOTES
S: 38 y.o. female with   Chief Complaint   Patient presents with    Diabetes       DM - on insulin. Uncontrolled. O: VS:  height is 5' 2\" (1.575 m) and weight is 226 lb (102.5 kg). Her temporal temperature is 97.1 °F (36.2 °C). Her blood pressure is 91/62 and her pulse is 99. Her respiration is 18 and oxygen saturation is 99%. BP Readings from Last 3 Encounters:   07/22/21 91/62   07/21/21 110/62   05/19/21 106/64     See resident note      Impression/Plan:   1) DM - poor control - she should follow with endo even if she is not using a pump   2) b/l wrist pain - stop using iphone as this is likley causing the dequervain's tendonitis - stretches and PT if needed  3. Foot Ulcer - back to podiatry        Health Maintenance Due   Topic Date Due    Hepatitis C screen  Never done    Varicella vaccine (1 of 2 - 2-dose childhood series) Never done    Pneumococcal 0-64 years Vaccine (1 of 2 - PPSV23) Never done    Diabetic retinal exam  Never done    HIV screen  Never done    Hepatitis B vaccine (1 of 3 - Risk 3-dose series) Never done    Cervical cancer screen  Never done    A1C test (Diabetic or Prediabetic)  07/01/2021         Attending Physician Statement  I have discussed the case, including pertinent history and exam findings with the resident. I also have seen the patient and performed key portions of the examination. I agree with the documented assessment and plan.       Rani Vazquez MD

## 2021-07-29 PROBLEM — M65.4 DE QUERVAIN'S DISEASE (TENOSYNOVITIS): Status: ACTIVE | Noted: 2021-07-29

## 2021-07-29 ASSESSMENT — ENCOUNTER SYMPTOMS
SORE THROAT: 0
DIARRHEA: 0
CONSTIPATION: 0
ABDOMINAL PAIN: 0
NAUSEA: 0
COUGH: 0
VOMITING: 0
TROUBLE SWALLOWING: 0

## 2021-08-09 DIAGNOSIS — Z79.4 TYPE 2 DIABETES MELLITUS WITH OTHER SPECIFIED COMPLICATION, WITH LONG-TERM CURRENT USE OF INSULIN (HCC): ICD-10-CM

## 2021-08-09 DIAGNOSIS — E11.69 TYPE 2 DIABETES MELLITUS WITH OTHER SPECIFIED COMPLICATION, WITH LONG-TERM CURRENT USE OF INSULIN (HCC): ICD-10-CM

## 2021-08-09 RX ORDER — INSULIN GLARGINE 100 [IU]/ML
40 INJECTION, SOLUTION SUBCUTANEOUS NIGHTLY
Qty: 1 VIAL | Refills: 3 | Status: SHIPPED
Start: 2021-08-09 | End: 2021-08-11 | Stop reason: CLARIF

## 2021-08-09 RX ORDER — LIRAGLUTIDE 6 MG/ML
1.2 INJECTION SUBCUTANEOUS DAILY
Qty: 2 PEN | Refills: 3 | Status: SHIPPED | OUTPATIENT
Start: 2021-08-09

## 2021-08-09 RX ORDER — INSULIN GLARGINE 100 [IU]/ML
40 INJECTION, SOLUTION SUBCUTANEOUS NIGHTLY
Qty: 5 PEN | Refills: 3 | Status: CANCELLED | OUTPATIENT
Start: 2021-08-09

## 2021-08-09 RX ORDER — INSULIN LISPRO 100 [IU]/ML
INJECTION, SOLUTION INTRAVENOUS; SUBCUTANEOUS
Qty: 10 PEN | Refills: 3 | Status: SHIPPED | OUTPATIENT
Start: 2021-08-09 | End: 2021-08-11 | Stop reason: SDUPTHER

## 2021-08-09 NOTE — TELEPHONE ENCOUNTER
Pt called to refill Humolog 100 unit. Pt is out of insulin and requesting refill asap.        Please send Salma Donis

## 2021-08-10 DIAGNOSIS — Z79.4 TYPE 2 DIABETES MELLITUS WITH OTHER SPECIFIED COMPLICATION, WITH LONG-TERM CURRENT USE OF INSULIN (HCC): ICD-10-CM

## 2021-08-10 DIAGNOSIS — E11.69 TYPE 2 DIABETES MELLITUS WITH OTHER SPECIFIED COMPLICATION, WITH LONG-TERM CURRENT USE OF INSULIN (HCC): ICD-10-CM

## 2021-08-10 NOTE — TELEPHONE ENCOUNTER
Pt called stating she received viles of insulin not the pen. She uses pens.   Pharmacy is waiting for contact as pt just spoke to pharmacy and is willing to send prescription today if they receive order for humolog pens by 11 am

## 2021-08-11 RX ORDER — INSULIN LISPRO 100 [IU]/ML
INJECTION, SOLUTION INTRAVENOUS; SUBCUTANEOUS
Qty: 10 PEN | Refills: 3 | Status: SHIPPED
Start: 2021-08-11 | End: 2021-12-08 | Stop reason: SDUPTHER

## 2021-08-11 RX ORDER — INSULIN GLARGINE 100 [IU]/ML
40 INJECTION, SOLUTION SUBCUTANEOUS NIGHTLY
Qty: 5 PEN | Refills: 3 | Status: SHIPPED
Start: 2021-08-11 | End: 2022-04-25 | Stop reason: SDUPTHER

## 2021-08-11 NOTE — TELEPHONE ENCOUNTER
I called pharmacy to verify Humalog and lantus prescriptions. - lantus vial was sent pharmacy. insurance will not cover lantus pen. They will cover the [facility] pen, which patient has had in the past.  - Humalog Kwik pen did not send to pharmacy due to the sig being to long to e-prescribe. This had a sliding scale on the prescription. I did shorten the wording for future prescriptions. Both prescriptions was verbally called to pharmacist at [facility].

## 2021-08-24 DIAGNOSIS — E11.69 TYPE 2 DIABETES MELLITUS WITH OTHER SPECIFIED COMPLICATION, WITH LONG-TERM CURRENT USE OF INSULIN (HCC): Primary | ICD-10-CM

## 2021-08-24 DIAGNOSIS — Z79.4 TYPE 2 DIABETES MELLITUS WITH OTHER SPECIFIED COMPLICATION, WITH LONG-TERM CURRENT USE OF INSULIN (HCC): Primary | ICD-10-CM

## 2021-08-24 RX ORDER — LANCETS 30 GAUGE
EACH MISCELLANEOUS
Qty: 300 EACH | Refills: 1 | Status: SHIPPED
Start: 2021-08-24 | End: 2021-09-23 | Stop reason: SDUPTHER

## 2021-08-24 RX ORDER — BLOOD SUGAR DIAGNOSTIC
STRIP MISCELLANEOUS
Qty: 300 EACH | Refills: 1 | Status: SHIPPED
Start: 2021-08-24 | End: 2021-09-29 | Stop reason: SDUPTHER

## 2021-09-23 DIAGNOSIS — E27.40 ADRENAL INSUFFICIENCY (HCC): ICD-10-CM

## 2021-09-23 DIAGNOSIS — K31.84 GASTROPARESIS: ICD-10-CM

## 2021-09-23 DIAGNOSIS — F32.A DEPRESSIVE DISORDER: ICD-10-CM

## 2021-09-23 DIAGNOSIS — E11.69 TYPE 2 DIABETES MELLITUS WITH OTHER SPECIFIED COMPLICATION, WITH LONG-TERM CURRENT USE OF INSULIN (HCC): ICD-10-CM

## 2021-09-23 DIAGNOSIS — Z79.4 TYPE 2 DIABETES MELLITUS WITH OTHER SPECIFIED COMPLICATION, WITH LONG-TERM CURRENT USE OF INSULIN (HCC): ICD-10-CM

## 2021-09-23 NOTE — TELEPHONE ENCOUNTER
Left voicemail, patient was to be seen 4 wks after last appointment on 7/22/21. For patient to call and schedule. - several refills requested, pended refills needed. - basaglar and humalog should still have refills at pharmacy.

## 2021-09-23 NOTE — TELEPHONE ENCOUNTER
----- Message from Lan Zhang sent at 9/22/2021 12:15 PM EDT -----  Subject: Refill Request    QUESTIONS  Name of Medication? dicyclomine (BENTYL) 10 MG capsule  Patient-reported dosage and instructions? 10 MG capsule  How many days do you have left? 14  Preferred Pharmacy? [address] phone number (if available)? [phone]  Additional Information for Provider? Pt stated 22 Cate Padilla advised her   she has no refills remaining for all her medications. Has about 2 weeks   worth of medication left. Pt of Santana Varghese. ---------------------------------------------------------------------------  --------------,  Name of Medication? triamcinolone (KENALOG) 0.1 % ointment  Patient-reported dosage and instructions? 0.1 % ointment  How many days do you have left? 14  Preferred Pharmacy? [address] phone number (if available)? [phone]  Additional Information for Provider? Pt stated Remedios Padilla advised her   she has no refills remaining for all her medications. Has about 2 weeks   worth of medication left. Pt of Santana Varghese. ---------------------------------------------------------------------------  --------------,  Name of Medication? sertraline (ZOLOFT) 50 MG tablet  Patient-reported dosage and instructions? 50 MG  How many days do you have left? 14  Preferred Pharmacy? [address] phone number (if available)? [phone]  Additional Information for Provider? Pt stated 22 Cate Padilla advised her   she has no refills remaining for all her medications. Has about 2 weeks   worth of medication left. Pt of Santana Varghese. ---------------------------------------------------------------------------  --------------,  Name of Medication? acetaminophen (TYLENOL) 650 MG extended release tablet  Patient-reported dosage and instructions? 650 MG  How many days do you have left? 14  Preferred Pharmacy?  [facility]  [address] [place] [address] phone number (if available)? [phone]  Additional Information for Provider? Pt stated Elliot Leyva advised her   she has no refills remaining for all her medications. Has about 2 weeks   worth of medication left. Pt of Jozef Kuhn. ---------------------------------------------------------------------------  --------------,  Name of Medication? aspirin 81 MG EC tablet  Patient-reported dosage and instructions? 81 EC tablet  How many days do you have left? 14  Preferred Pharmacy? [address] phone number (if available)? [phone]  Additional Information for Provider? Pt stated Elliot Leyva advised her   she has no refills remaining for all her medications. Has about 2 weeks   worth of medication left. Pt of Jozef Kuhn. ---------------------------------------------------------------------------  --------------,  Name of Medication? cetirizine (ZYRTEC ALLERGY) 10 MG tablet  Patient-reported dosage and instructions? 10 MG  How many days do you have left? 0  Preferred Pharmacy? [address] phone number (if available)? [phone]  Additional Information for Provider? Pt stated Elliot Leyva advised her   she has no refills remaining for all her medications. Has about 2 weeks   worth of medication left. Pt of Jozef Kuhn. ---------------------------------------------------------------------------  --------------,  Name of Medication? famotidine (PEPCID) 20 MG tablet  Patient-reported dosage and instructions? 20 MG  How many days do you have left? 14  Preferred Pharmacy? [address] phone number (if available)? [phone]  Additional Information for Provider? Pt stated 101 Althea Boise advised her   she has no refills remaining for all her medications. Has about 2 weeks   worth of medication left. Pt of Jozef Kuhn. ---------------------------------------------------------------------------  --------------,  Name of Medication? ibuprofen (ADVIL;MOTRIN) 600 MG tablet  Patient-reported dosage and instructions? 600 MG tablet  How many days do you have left? 14  Preferred Pharmacy? [address] phone number (if available)? [phone]  Additional Information for Provider? Pt stated 101 Althea Leyva advised her   she has no refills remaining for all her medications. Has about 2 weeks   worth of medication left. Pt of Matias Comment. ---------------------------------------------------------------------------  --------------,  Name of Medication? pantoprazole (PROTONIX) 40 MG tablet  Patient-reported dosage and instructions? 40 MG  How many days do you have left? 14  Preferred Pharmacy? [address] phone number (if available)? [phone]  Additional Information for Provider? Pt stated 101 Althea Leyva advised her   she has no refills remaining for all her medications. Has about 2 weeks   worth of medication left. Pt of Matias Comment. ---------------------------------------------------------------------------  --------------,  Name of Medication? potassium chloride (KLOR-CON M) 10 MEQ extended   release tablet  Patient-reported dosage and instructions? 10 MEQ  How many days do you have left? 14  Preferred Pharmacy? [address] phone number (if available)? [phone]  Additional Information for Provider? Pt stated 101 Althea Leyva advised her   she has no refills remaining for all her medications. Has about 2 weeks   worth of medication left. Pt marga Salcedo Comment. ---------------------------------------------------------------------------  --------------,  Name of Medication? gabapentin (NEURONTIN) 600 MG tablet  Patient-reported dosage and instructions? 600 MG  How many days do you have left? 14  Preferred Pharmacy?  [facility]  [address] [place] [address] phone number (if available)? [phone]  Additional Information for Provider? Pt stated 101 Althea Leyva advised her   she has no refills remaining for all her medications. Has about 2 weeks   worth of medication left. Pt of Shahab Palencia. ---------------------------------------------------------------------------  --------------,  Name of Medication? neomycin-bacitracin-polymyxin (NEOSPORIN) 400-5-5000   ointment  Patient-reported dosage and instructions? 400-5-5000 ointment  How many days do you have left? 14  Preferred Pharmacy? [address] phone number (if available)? [phone]  Additional Information for Provider? Pt stated 101 Althea Leyva advised her   she has no refills remaining for all her medications. Has about 2 weeks   worth of medication left. Pt of Shahab Palencia. ---------------------------------------------------------------------------  --------------,  Name of Medication? doxycycline hyclate (VIBRA-TABS) 100 MG tablet  Patient-reported dosage and instructions? 100 MG  How many days do you have left? 14  Preferred Pharmacy? [address] phone number (if available)? [phone]  ---------------------------------------------------------------------------  --------------,  Name of Medication? Blood Glucose Monitoring Suppl w/Device KIT  Patient-reported dosage and instructions? Glucose Monitoring Kit  How many days do you have left? 14  Preferred Pharmacy? [address] phone number (if available)? [phone]  ---------------------------------------------------------------------------  --------------,  Name of Medication? ondansetron (ZOFRAN ODT) 4 MG disintegrating tablet  Patient-reported dosage and instructions? 4 MG  How many days do you have left? 14  Preferred Pharmacy? [address] phone number (if available)? [phone]  Additional Information for Provider? Pt stated 101 Althea Leyva advised her   she has no refills remaining for all her medications. Has about 2 weeks   worth of medication left. Pt of April Rodgers. ---------------------------------------------------------------------------  --------------  Luann Soriano INFO  What is the best way for the office to contact you? OK to leave message on   voicemail  Preferred Call Back Phone Number?  [phone]

## 2021-09-23 NOTE — TELEPHONE ENCOUNTER
Bisi VELARDE yx [facility] Clinical Staff  Subject: Refill Request     QUESTIONS   Name of Medication? atorvastatin (LIPITOR) 40 MG tablet   Patient-reported dosage and instructions? 40 MG   How many days do you have left? 14   Preferred Pharmacy? [facility] phone number (if available)? [phone]   Additional Information for Provider? Pt stated 101 Althea Leyva advised her   she has no refills remaining for all her medications. Has about 2 weeks   worth of medication left. Pt of Damian Thompson. ---------------------------------------------------------------------------   --------------,   Name of Medication? Liraglutide (VICTOZA) 18 MG/3ML SOPN SC injection   Patient-reported dosage and instructions? 18 MG/3ML   How many days do you have left? 14   Preferred Pharmacy? [facility] phone number (if available)? [phone]   ---------------------------------------------------------------------------   --------------,   Name of Medication? insulin glargine (BASAGLAR KWIKPEN) 100 UNIT/ML   injection pen   Patient-reported dosage and instructions? 100 Unit/ML   How many days do you have left? 14   Preferred Pharmacy? [facility] phone number (if available)? [phone]   ---------------------------------------------------------------------------   --------------,   Name of Medication? insulin lispro, 1 Unit Dial, (HUMALOG KWIKPEN) 100   UNIT/ML SOPN   Patient-reported dosage and instructions? 100 Unit/ML   How many days do you have left? 14   Preferred Pharmacy? [address] [facility] phone number (if available)? [phone]   ---------------------------------------------------------------------------   --------------,   Name of Medication? blood glucose test strips (ONETOUCH ULTRA) strip   Patient-reported dosage and instructions? Test strip   How many days do you have left?  15 Preferred Pharmacy? [address] phone number (if available)? [phone]   ---------------------------------------------------------------------------   --------------,   Name of Medication? Lancets (ONETOUCH DELICA PLUS [id number]) MISC   Patient-reported dosage and instructions? Lancets   How many days do you have left? 14   Preferred Pharmacy? [address] phone number (if available)? [phone]   Additional Information for Provider? Pt stated 101 Althea Gordon advised her   she has no refills remaining for all her medications. Has about 2 weeks   worth of medication left. Pt of Damian Thompson. ---------------------------------------------------------------------------   --------------   Surekha FROST   What is the best way for the office to contact you? OK to leave message on   voicemail   Preferred Call Back Phone Number?  [phone]

## 2021-09-29 RX ORDER — BLOOD SUGAR DIAGNOSTIC
STRIP MISCELLANEOUS
Qty: 300 EACH | Refills: 1 | Status: SHIPPED
Start: 2021-09-29 | End: 2021-12-08 | Stop reason: SDUPTHER

## 2021-09-29 RX ORDER — LANCETS 30 GAUGE
EACH MISCELLANEOUS
Qty: 300 EACH | Refills: 1 | Status: SHIPPED | OUTPATIENT
Start: 2021-09-29

## 2021-09-29 RX ORDER — ATORVASTATIN CALCIUM 40 MG/1
20 TABLET, FILM COATED ORAL DAILY
Qty: 30 TABLET | Refills: 0 | Status: SHIPPED | OUTPATIENT
Start: 2021-09-29

## 2021-09-29 RX ORDER — IBUPROFEN 600 MG/1
600 TABLET ORAL 4 TIMES DAILY PRN
Qty: 40 TABLET | Refills: 0 | Status: SHIPPED
Start: 2021-09-29 | End: 2021-10-06

## 2021-09-29 RX ORDER — ASPIRIN 81 MG/1
81 TABLET ORAL DAILY
Qty: 30 TABLET | Refills: 0 | Status: SHIPPED | OUTPATIENT
Start: 2021-09-29

## 2021-09-29 RX ORDER — SENNOSIDES 8.6 MG
650 CAPSULE ORAL EVERY 8 HOURS PRN
Qty: 30 TABLET | Refills: 0 | Status: SHIPPED
Start: 2021-09-29 | End: 2021-10-06

## 2021-09-29 RX ORDER — POTASSIUM CHLORIDE 750 MG/1
10 TABLET, EXTENDED RELEASE ORAL DAILY
Qty: 30 TABLET | Refills: 0 | Status: SHIPPED | OUTPATIENT
Start: 2021-09-29

## 2021-09-29 RX ORDER — FAMOTIDINE 20 MG/1
20 TABLET, FILM COATED ORAL 2 TIMES DAILY
Qty: 60 TABLET | Refills: 3 | Status: SHIPPED | OUTPATIENT
Start: 2021-09-29

## 2021-09-29 RX ORDER — PANTOPRAZOLE SODIUM 40 MG/1
40 TABLET, DELAYED RELEASE ORAL
Qty: 30 TABLET | Refills: 0 | Status: SHIPPED
Start: 2021-09-29 | End: 2021-11-17

## 2021-09-29 RX ORDER — CETIRIZINE HYDROCHLORIDE 10 MG/1
10 TABLET ORAL DAILY
Qty: 30 TABLET | Refills: 0 | Status: SHIPPED | OUTPATIENT
Start: 2021-09-29

## 2021-09-29 RX ORDER — DICYCLOMINE HYDROCHLORIDE 10 MG/1
20 CAPSULE ORAL
Qty: 15 CAPSULE | Refills: 0 | Status: SHIPPED | OUTPATIENT
Start: 2021-09-29 | End: 2022-09-29

## 2021-10-05 DIAGNOSIS — E27.40 ADRENAL INSUFFICIENCY (HCC): ICD-10-CM

## 2021-10-06 RX ORDER — PURIFIED WATER 99.05 G/100ML
LIQUID OPHTHALMIC
Qty: 30 TABLET | Refills: 0 | Status: SHIPPED | OUTPATIENT
Start: 2021-10-06

## 2021-10-06 RX ORDER — IBUPROFEN 600 MG/1
TABLET ORAL
Qty: 40 TABLET | Refills: 0 | Status: SHIPPED | OUTPATIENT
Start: 2021-10-06

## 2021-11-17 DIAGNOSIS — F32.A DEPRESSIVE DISORDER: ICD-10-CM

## 2021-11-17 RX ORDER — PANTOPRAZOLE SODIUM 40 MG/1
TABLET, DELAYED RELEASE ORAL
Qty: 30 TABLET | Refills: 0 | Status: SHIPPED
Start: 2021-11-17 | End: 2021-12-15

## 2021-11-17 NOTE — TELEPHONE ENCOUNTER
Requested Prescriptions     Pending Prescriptions Disp Refills    pantoprazole (PROTONIX) 40 MG tablet [Pharmacy Med Name: Pantoprazole Sodium 40MG TBEC] 30 tablet 0     Sig: TAKE 1 TABLET BY MOUTH EVERY MORNING BEFORE BREAKFAST

## 2021-12-07 ENCOUNTER — TELEPHONE (OUTPATIENT)
Dept: FAMILY MEDICINE CLINIC | Age: 36
End: 2021-12-07

## 2021-12-07 DIAGNOSIS — E11.69 TYPE 2 DIABETES MELLITUS WITH OTHER SPECIFIED COMPLICATION, WITH LONG-TERM CURRENT USE OF INSULIN (HCC): ICD-10-CM

## 2021-12-07 DIAGNOSIS — Z79.4 TYPE 2 DIABETES MELLITUS WITH OTHER SPECIFIED COMPLICATION, WITH LONG-TERM CURRENT USE OF INSULIN (HCC): ICD-10-CM

## 2021-12-07 NOTE — TELEPHONE ENCOUNTER
Needs refill of [employer]. Also needs a new glucometer script sent in with test strips as well.

## 2021-12-08 RX ORDER — INSULIN LISPRO 100 [IU]/ML
INJECTION, SOLUTION INTRAVENOUS; SUBCUTANEOUS
Qty: 10 PEN | Refills: 3 | Status: SHIPPED | OUTPATIENT
Start: 2021-12-08

## 2021-12-08 RX ORDER — BLOOD SUGAR DIAGNOSTIC
STRIP MISCELLANEOUS
Qty: 300 EACH | Refills: 1 | Status: SHIPPED
Start: 2021-12-08 | End: 2022-08-23

## 2021-12-14 DIAGNOSIS — F32.A DEPRESSIVE DISORDER: ICD-10-CM

## 2021-12-15 RX ORDER — PANTOPRAZOLE SODIUM 40 MG/1
TABLET, DELAYED RELEASE ORAL
Qty: 30 TABLET | Refills: 0 | Status: SHIPPED | OUTPATIENT
Start: 2021-12-15

## 2022-01-31 ENCOUNTER — APPOINTMENT (OUTPATIENT)
Dept: GENERAL RADIOLOGY | Age: 37
End: 2022-01-31
Payer: COMMERCIAL

## 2022-01-31 ENCOUNTER — HOSPITAL ENCOUNTER (EMERGENCY)
Age: 37
Discharge: HOME | End: 2022-01-31
Attending: EMERGENCY MEDICINE
Payer: COMMERCIAL

## 2022-01-31 VITALS
OXYGEN SATURATION: 97 % | WEIGHT: 240 LBS | RESPIRATION RATE: 18 BRPM | SYSTOLIC BLOOD PRESSURE: 106 MMHG | DIASTOLIC BLOOD PRESSURE: 52 MMHG | HEART RATE: 82 BPM | BODY MASS INDEX: 44.16 KG/M2 | HEIGHT: 62 IN | TEMPERATURE: 98.1 F

## 2022-01-31 DIAGNOSIS — R42 DIZZINESS: Primary | ICD-10-CM

## 2022-01-31 LAB
ALBUMIN SERPL-MCNC: 3.7 G/DL (ref 3.5–5.2)
ALP BLD-CCNC: 156 U/L (ref 35–104)
ALT SERPL-CCNC: 27 U/L (ref 0–32)
ANION GAP SERPL CALCULATED.3IONS-SCNC: 9 MMOL/L (ref 7–16)
AST SERPL-CCNC: 44 U/L (ref 0–31)
BACTERIA: NORMAL /HPF
BASOPHILS ABSOLUTE: 0.06 E9/L (ref 0–0.2)
BASOPHILS RELATIVE PERCENT: 0.8 % (ref 0–2)
BILIRUB SERPL-MCNC: 0.4 MG/DL (ref 0–1.2)
BILIRUBIN URINE: NEGATIVE
BLOOD, URINE: NEGATIVE
BUN BLDV-MCNC: 8 MG/DL (ref 6–20)
CALCIUM SERPL-MCNC: 9.4 MG/DL (ref 8.6–10.2)
CHLORIDE BLD-SCNC: 96 MMOL/L (ref 98–107)
CLARITY: CLEAR
CO2: 30 MMOL/L (ref 22–29)
COLOR: YELLOW
CREAT SERPL-MCNC: 1.1 MG/DL (ref 0.5–1)
EKG ATRIAL RATE: 86 BPM
EKG P AXIS: 64 DEGREES
EKG P-R INTERVAL: 136 MS
EKG Q-T INTERVAL: 468 MS
EKG QRS DURATION: 86 MS
EKG QTC CALCULATION (BAZETT): 560 MS
EKG R AXIS: 17 DEGREES
EKG T AXIS: 11 DEGREES
EKG VENTRICULAR RATE: 86 BPM
EOSINOPHILS ABSOLUTE: 0.28 E9/L (ref 0.05–0.5)
EOSINOPHILS RELATIVE PERCENT: 3.8 % (ref 0–6)
GFR AFRICAN AMERICAN: >60
GFR NON-AFRICAN AMERICAN: 56 ML/MIN/1.73
GLUCOSE BLD-MCNC: 175 MG/DL
GLUCOSE BLD-MCNC: 203 MG/DL (ref 74–99)
GLUCOSE URINE: >=1000 MG/DL
HCG, URINE, POC: NEGATIVE
HCT VFR BLD CALC: 43 % (ref 34–48)
HEMOGLOBIN: 14.1 G/DL (ref 11.5–15.5)
IMMATURE GRANULOCYTES #: 0.02 E9/L
IMMATURE GRANULOCYTES %: 0.3 % (ref 0–5)
KETONES, URINE: NEGATIVE MG/DL
LEUKOCYTE ESTERASE, URINE: ABNORMAL
LYMPHOCYTES ABSOLUTE: 3.45 E9/L (ref 1.5–4)
LYMPHOCYTES RELATIVE PERCENT: 46.7 % (ref 20–42)
Lab: NORMAL
MCH RBC QN AUTO: 26.6 PG (ref 26–35)
MCHC RBC AUTO-ENTMCNC: 32.8 % (ref 32–34.5)
MCV RBC AUTO: 81 FL (ref 80–99.9)
METER GLUCOSE: 175 MG/DL (ref 74–99)
MONOCYTES ABSOLUTE: 0.4 E9/L (ref 0.1–0.95)
MONOCYTES RELATIVE PERCENT: 5.4 % (ref 2–12)
NEGATIVE QC PASS/FAIL: NORMAL
NEUTROPHILS ABSOLUTE: 3.17 E9/L (ref 1.8–7.3)
NEUTROPHILS RELATIVE PERCENT: 43 % (ref 43–80)
NITRITE, URINE: NEGATIVE
PDW BLD-RTO: 12.5 FL (ref 11.5–15)
PH UA: 5.5 (ref 5–9)
PLATELET # BLD: 204 E9/L (ref 130–450)
PMV BLD AUTO: 12.1 FL (ref 7–12)
POSITIVE QC PASS/FAIL: NORMAL
POTASSIUM REFLEX MAGNESIUM: 3.9 MMOL/L (ref 3.5–5)
PRO-BNP: 209 PG/ML (ref 0–125)
PROTEIN UA: NEGATIVE MG/DL
RBC # BLD: 5.31 E12/L (ref 3.5–5.5)
RBC UA: NORMAL /HPF (ref 0–2)
RENAL EPITHELIAL, UA: NORMAL /HPF
SARS-COV-2, NAAT: NOT DETECTED
SODIUM BLD-SCNC: 135 MMOL/L (ref 132–146)
SPECIFIC GRAVITY UA: 1.02 (ref 1–1.03)
TOTAL PROTEIN: 7.4 G/DL (ref 6.4–8.3)
TROPONIN, HIGH SENSITIVITY: 11 NG/L (ref 0–9)
TROPONIN, HIGH SENSITIVITY: 11 NG/L (ref 0–9)
UROBILINOGEN, URINE: 0.2 E.U./DL
WBC # BLD: 7.4 E9/L (ref 4.5–11.5)
WBC UA: NORMAL /HPF (ref 0–5)

## 2022-01-31 PROCEDURE — 80053 COMPREHEN METABOLIC PANEL: CPT

## 2022-01-31 PROCEDURE — 93010 ELECTROCARDIOGRAM REPORT: CPT | Performed by: INTERNAL MEDICINE

## 2022-01-31 PROCEDURE — 87635 SARS-COV-2 COVID-19 AMP PRB: CPT

## 2022-01-31 PROCEDURE — 71045 X-RAY EXAM CHEST 1 VIEW: CPT

## 2022-01-31 PROCEDURE — 93005 ELECTROCARDIOGRAM TRACING: CPT | Performed by: STUDENT IN AN ORGANIZED HEALTH CARE EDUCATION/TRAINING PROGRAM

## 2022-01-31 PROCEDURE — 82962 GLUCOSE BLOOD TEST: CPT

## 2022-01-31 PROCEDURE — 81001 URINALYSIS AUTO W/SCOPE: CPT

## 2022-01-31 PROCEDURE — 85025 COMPLETE CBC W/AUTO DIFF WBC: CPT

## 2022-01-31 PROCEDURE — 84484 ASSAY OF TROPONIN QUANT: CPT

## 2022-01-31 PROCEDURE — 99285 EMERGENCY DEPT VISIT HI MDM: CPT

## 2022-01-31 PROCEDURE — 2580000003 HC RX 258: Performed by: EMERGENCY MEDICINE

## 2022-01-31 PROCEDURE — 83880 ASSAY OF NATRIURETIC PEPTIDE: CPT

## 2022-01-31 RX ORDER — 0.9 % SODIUM CHLORIDE 0.9 %
1000 INTRAVENOUS SOLUTION INTRAVENOUS ONCE
Status: COMPLETED | OUTPATIENT
Start: 2022-01-31 | End: 2022-01-31

## 2022-01-31 RX ORDER — SODIUM CHLORIDE 9 MG/ML
INJECTION, SOLUTION INTRAVENOUS CONTINUOUS
Status: DISCONTINUED | OUTPATIENT
Start: 2022-01-31 | End: 2022-01-31 | Stop reason: HOSPADM

## 2022-01-31 RX ADMIN — SODIUM CHLORIDE 1000 ML: 9 INJECTION, SOLUTION INTRAVENOUS at 09:45

## 2022-01-31 ASSESSMENT — ENCOUNTER SYMPTOMS
RHINORRHEA: 0
NAUSEA: 0
COUGH: 0
VOMITING: 0
VOICE CHANGE: 0
PHOTOPHOBIA: 0
SHORTNESS OF BREATH: 0
ABDOMINAL PAIN: 1
DIARRHEA: 0
TROUBLE SWALLOWING: 0

## 2022-01-31 NOTE — ED NOTES
Bed:   Expected date:   Expected time:   Means of arrival:   Comments:  Elizabeth Allison RN  01/31/22 [id number]

## 2022-01-31 NOTE — ED PROVIDER NOTES
HPI   Patient is a 69-year-old female with medical history of diabetes presenting the emergency department due to acute dizziness. She states that earlier this morning, she checked her blood sugar and it was 180. Patient states that she ate some jam but was starting to feel acutely dizzy. She described this as a heaviness in her head and was having unsteady gait. She notes that she was lightheaded and when she checked her blood pressure it was 67 systolic. Patient admits to hypotension at baseline with a systolic BP of  usually, however she called EMS because she she felt like her blood pressure was \"too low\". Patient also endorses mild chest pain. She localizes pain to her mid chest.  She states it feels like a pressure that is nonradiating and intermittent. Nothing in particular makes the pain better or worse. No particular association with exertion. Chest pain had resolved since coming to the ED. She denies any shortness of breath, fever, chills, vomiting, syncope, weakness, vision changes, urinary symptoms, constipation or diarrhea. Review of Systems   Constitutional: Negative for chills, fatigue and fever. HENT: Negative for congestion, rhinorrhea, trouble swallowing and voice change. Eyes: Negative for photophobia and visual disturbance. Respiratory: Negative for cough and shortness of breath. Cardiovascular: Positive for chest pain. Negative for palpitations. Gastrointestinal: Positive for abdominal pain. Negative for diarrhea, nausea and vomiting. Chronic abdominal pain, unchanged   Genitourinary: Negative for dysuria, frequency, hematuria and urgency. Musculoskeletal: Negative for arthralgias and neck pain. Skin: Negative for rash and wound. Neurological: Positive for dizziness. Negative for seizures, syncope, weakness and headaches. Psychiatric/Behavioral: Negative for behavioral problems and confusion.         Physical Exam  Constitutional:       General: She is not in acute distress. Appearance: Normal appearance. She is not ill-appearing. HENT:      Head: Normocephalic and atraumatic. Right Ear: External ear normal.      Left Ear: External ear normal.      Nose: Nose normal.      Mouth/Throat:      Mouth: Mucous membranes are moist.      Pharynx: Oropharynx is clear. Eyes:      Conjunctiva/sclera: Conjunctivae normal.      Pupils: Pupils are equal, round, and reactive to light. Comments: No nystagmus. Cardiovascular:      Rate and Rhythm: Normal rate and regular rhythm. Pulses: Normal pulses. Heart sounds: Normal heart sounds. Pulmonary:      Effort: Pulmonary effort is normal. No respiratory distress. Breath sounds: Normal breath sounds. No wheezing or rales. Abdominal:      General: Abdomen is flat. Palpations: Abdomen is soft. Tenderness: There is no abdominal tenderness. There is no guarding or rebound. Musculoskeletal:      Cervical back: Normal range of motion and neck supple. Right lower leg: No edema. Left lower leg: No edema. Skin:     General: Skin is warm and dry. Capillary Refill: Capillary refill takes less than 2 seconds. Neurological:      General: No focal deficit present. Mental Status: She is alert and oriented to person, place, and time. Cranial Nerves: No cranial nerve deficit. Coordination: Coordination normal.   Psychiatric:         Mood and Affect: Mood normal.         Behavior: Behavior normal.          Procedures   EKG: This EKG is signed and interpreted by me. Normal sinus rhythm with a ventricular rate of 86 bpm.  Normal axis. NH interval normal.  QTc prolonged 560 ms. Low voltage QRS. Nonspecific T wave abnormalities. No evidence of acute STEMI. No significant changes compared to previous EKG. MDM   Patient is a 68-year-old female with past medical history of diabetes presents to the ED due to acute dizziness.   She also endorses vague chest pain.  She describes her dizziness as lightheadedness. Denies any associated nausea or vomiting. Patient initially hypotensive with a blood pressure of 85/45 with remaining vitals within normal limits. Glucose mildly elevated at 203 although clinically patient does not appear to be in DKA. Lab work-up also does not support DKA. Patient has no anion gap and bicarb is normal.  Remaining work-up unremarkable. Cardiac work-up insignificant. EKG sinus and nonspecific. Initial troponin 11 with a repeat of 11, delta 0. Patient denying any chest pain while in the ED. Patient given IV fluids and repeat blood pressure was improved. On reevaluation, patient reported that her symptoms were improved. Patient was seen ambulating and was able to walk without difficulty. She was denying any symptoms with ambulation. Work-up and results were discussed with the patient and she was comfortable with discharge home. She was advised to follow-up with her primary care provider. She made hemodynamically stable for discharge.        --------------------------------------------- PAST HISTORY ---------------------------------------------  Past Medical History:  has a past medical history of Adrenal abnormality ([facility] Cellulitis of foot, right, [facility] Failed skin graft, Ischemic toe, and Skin flap necrosis ([facility] Past Surgical History:  has a past surgical history that includes Cholecystectomy;  section; Foot Debridement (Right, 2020); and Foot Debridement (Right, 7/10/2020). Social History:  reports that she is a non-smoker but has been exposed to tobacco smoke. She has never used smokeless tobacco. She reports that she does not drink alcohol and does not use drugs. Family History: family history includes Heart Attack in her father. The patients home medications have been reviewed.     Allergies: Rocephin [ceftriaxone]    -------------------------------------------------- RESULTS -------------------------------------------------  Labs:  Results for orders placed or performed during the hospital encounter of 01/31/22   COVID-19, Rapid    Specimen: Nasopharyngeal Swab   Result Value Ref Range    SARS-CoV-2, NAAT Not Detected Not Detected   CBC Auto Differential   Result Value Ref Range    WBC 7.4 4.5 - 11.5 E9/L    RBC 5.31 3.50 - 5.50 E12/L    Hemoglobin 14.1 11.5 - 15.5 g/dL    Hematocrit 43.0 34.0 - 48.0 %    MCV 81.0 80.0 - 99.9 fL    MCH 26.6 26.0 - 35.0 pg    MCHC 32.8 32.0 - 34.5 %    RDW 12.5 11.5 - 15.0 fL    Platelets 609 231 - 077 E9/L    MPV 12.1 (H) 7.0 - 12.0 fL    Neutrophils % 43.0 43.0 - 80.0 %    Immature Granulocytes % 0.3 0.0 - 5.0 %    Lymphocytes % 46.7 (H) 20.0 - 42.0 %    Monocytes % 5.4 2.0 - 12.0 %    Eosinophils % 3.8 0.0 - 6.0 %    Basophils % 0.8 0.0 - 2.0 %    Neutrophils Absolute 3.17 1.80 - 7.30 E9/L    Immature Granulocytes # 0.02 E9/L    Lymphocytes Absolute 3.45 1.50 - 4.00 E9/L    Monocytes Absolute 0.40 0.10 - 0.95 E9/L    Eosinophils Absolute 0.28 0.05 - 0.50 E9/L    Basophils Absolute 0.06 0.00 - 0.20 E9/L   Troponin   Result Value Ref Range    Troponin, High Sensitivity 11 (H) 0 - 9 ng/L   Brain Natriuretic Peptide   Result Value Ref Range    Pro- (H) 0 - 125 pg/mL   Comprehensive Metabolic Panel w/ Reflex to MG   Result Value Ref Range    Sodium 135 132 - 146 mmol/L    Potassium reflex Magnesium 3.9 3.5 - 5.0 mmol/L    Chloride 96 (L) 98 - 107 mmol/L    CO2 30 (H) 22 - 29 mmol/L    Anion Gap 9 7 - 16 mmol/L    Glucose 203 (H) 74 - 99 mg/dL    BUN 8 6 - 20 mg/dL    CREATININE 1.1 (H) 0.5 - 1.0 mg/dL    GFR Non-African American 56 >=60 mL/min/1.73    GFR African American >60     Calcium 9.4 8.6 - 10.2 mg/dL    Total Protein 7.4 6.4 - 8.3 g/dL    Albumin 3.7 3.5 - 5.2 g/dL    Total Bilirubin 0.4 0.0 - 1.2 mg/dL    Alkaline Phosphatase 156 (H) 35 - 104 U/L    ALT 27 0 - 32 U/L    AST 44 (H) 0 - 31 U/L   Troponin   Result Value Ref Range    Troponin,

## 2022-04-25 DIAGNOSIS — Z79.4 TYPE 2 DIABETES MELLITUS WITH OTHER SPECIFIED COMPLICATION, WITH LONG-TERM CURRENT USE OF INSULIN (HCC): ICD-10-CM

## 2022-04-25 DIAGNOSIS — E11.69 TYPE 2 DIABETES MELLITUS WITH OTHER SPECIFIED COMPLICATION, WITH LONG-TERM CURRENT USE OF INSULIN (HCC): ICD-10-CM

## 2022-04-26 RX ORDER — INSULIN GLARGINE 100 [IU]/ML
40 INJECTION, SOLUTION SUBCUTANEOUS NIGHTLY
Qty: 10 ML | Refills: 3 | Status: SHIPPED | OUTPATIENT
Start: 2022-04-26

## 2022-08-23 DIAGNOSIS — E11.69 TYPE 2 DIABETES MELLITUS WITH OTHER SPECIFIED COMPLICATION, WITH LONG-TERM CURRENT USE OF INSULIN (HCC): ICD-10-CM

## 2022-08-23 DIAGNOSIS — Z79.4 TYPE 2 DIABETES MELLITUS WITH OTHER SPECIFIED COMPLICATION, WITH LONG-TERM CURRENT USE OF INSULIN (HCC): ICD-10-CM

## 2022-08-23 RX ORDER — BLOOD SUGAR DIAGNOSTIC
STRIP MISCELLANEOUS
Qty: 100 EACH | Refills: 1 | Status: SHIPPED | OUTPATIENT
Start: 2022-08-23

## (undated) DEVICE — HANDPIECE SET WITH BONE CLEANING TIP AND SUCTION TUBE: Brand: INTERPULSE

## (undated) DEVICE — APPLICATOR MEDICATED 26 CC SOLUTION HI LT ORNG CHLORAPREP

## (undated) DEVICE — GOWN,SIRUS,FABRNF,L,20/CS: Brand: MEDLINE

## (undated) DEVICE — INTENDED FOR TISSUE SEPARATION, AND OTHER PROCEDURES THAT REQUIRE A SHARP SURGICAL BLADE TO PUNCTURE OR CUT.: Brand: BARD-PARKER ® STAINLESS STEEL BLADES

## (undated) DEVICE — Z INACTIVE USE 2660664 SOLUTION IRRIG 3000ML 0.9% SOD CHL USP UROMATIC PLAS CONT

## (undated) DEVICE — GAUZE,SPONGE,AVANT,4"X4",4PLY,STRL,10/TR: Brand: MEDLINE

## (undated) DEVICE — BANDAGE COMPR W4INXL10YD WHITE/BEIGE E MTRX HK LOOP CLSR

## (undated) DEVICE — SET ORTHO STD STORTSTD1

## (undated) DEVICE — SYRINGE MED 10ML LUERLOCK TIP W/O SFTY DISP

## (undated) DEVICE — BANDAGE,GAUZE,4.5"X4.1YD,STERILE,LF: Brand: MEDLINE

## (undated) DEVICE — SET ORTHO STD STORTSTD2

## (undated) DEVICE — TOTAL KNEE PK

## (undated) DEVICE — SWAB SPEC COLL SHFT L5.25IN POLYUR FOAM TIP SFT DBL MEDIA

## (undated) DEVICE — NEEDLE BNE MAR ASPIR 11GA L4IN TWO PC HNDL W/ PRB JAMSH

## (undated) DEVICE — GAUZE,PACKING STRIP,PLAIN,1/2"X5YD,STRL: Brand: CURAD

## (undated) DEVICE — CONTAINER VACUTAINER ANAER CULTURE SWAB

## (undated) DEVICE — NEEDLE HYPO 25GA L1.5IN BLU POLYPR HUB S STL REG BVL STR

## (undated) DEVICE — SURGICAL PROCEDURE PACK BASIC

## (undated) DEVICE — TOWEL,OR,DSP,ST,BLUE,STD,6/PK,12PK/CS: Brand: MEDLINE

## (undated) DEVICE — ZIMMER® STERILE DISPOSABLE TOURNIQUET CUFF WITH PROTECTIVE SLEEVE AND PLC, DUAL PORT, SINGLE BLADDER, 34 IN. (86 CM)

## (undated) DEVICE — TRAP,MUCUS SPECIMEN,40CC: Brand: MEDLINE

## (undated) DEVICE — Z DISCONTINUED GLOVE SURG SZ 7 L12IN FNGR THK13MIL WHT ISOLEX POLYISOPRENE

## (undated) DEVICE — NEEDLE SYR 18GA L1.5IN RED PLAS HUB S STL BLNT FILL W/O